# Patient Record
Sex: MALE | HISPANIC OR LATINO | Employment: FULL TIME | URBAN - METROPOLITAN AREA
[De-identification: names, ages, dates, MRNs, and addresses within clinical notes are randomized per-mention and may not be internally consistent; named-entity substitution may affect disease eponyms.]

---

## 2017-12-29 ENCOUNTER — OFFICE VISIT (OUTPATIENT)
Dept: MEDICAL GROUP | Facility: PHYSICIAN GROUP | Age: 32
End: 2017-12-29
Payer: COMMERCIAL

## 2017-12-29 VITALS
OXYGEN SATURATION: 96 % | HEART RATE: 117 BPM | WEIGHT: 188 LBS | SYSTOLIC BLOOD PRESSURE: 118 MMHG | DIASTOLIC BLOOD PRESSURE: 80 MMHG | HEIGHT: 72 IN | RESPIRATION RATE: 16 BRPM | TEMPERATURE: 100.1 F | BODY MASS INDEX: 25.47 KG/M2

## 2017-12-29 DIAGNOSIS — M79.10 MYALGIA: ICD-10-CM

## 2017-12-29 DIAGNOSIS — R52 BODY ACHES: ICD-10-CM

## 2017-12-29 DIAGNOSIS — H61.22 IMPACTED CERUMEN OF LEFT EAR: ICD-10-CM

## 2017-12-29 DIAGNOSIS — Z00.00 ROUTINE ADULT HEALTH MAINTENANCE: ICD-10-CM

## 2017-12-29 LAB
FLUAV+FLUBV AG SPEC QL IA: NEGATIVE
INT CON NEG: NEGATIVE
INT CON POS: POSITIVE

## 2017-12-29 PROCEDURE — 69210 REMOVE IMPACTED EAR WAX UNI: CPT | Performed by: INTERNAL MEDICINE

## 2017-12-29 PROCEDURE — 99203 OFFICE O/P NEW LOW 30 MIN: CPT | Mod: 25 | Performed by: INTERNAL MEDICINE

## 2017-12-29 PROCEDURE — 87804 INFLUENZA ASSAY W/OPTIC: CPT | Performed by: INTERNAL MEDICINE

## 2017-12-29 ASSESSMENT — PATIENT HEALTH QUESTIONNAIRE - PHQ9: CLINICAL INTERPRETATION OF PHQ2 SCORE: 0

## 2017-12-29 NOTE — PROGRESS NOTES
PRIMARY CARE CLINIC NEW PATIENT H&P  Chief Complaint   Patient presents with   • Sinus Problem   • Generalized Body Aches   • Otalgia     History of Present Illness     Myalgia  Symptoms started 3 days ago when he started to lose his voice. Reporting subjective fever and generalized body aches started yesterday. Has been taking Nyquil and Dayquil which helps. Also having bilateral ear pain when he sneezes and maxillary sinus tenderness. Denies nausea, vomiting.     Impacted cerumen of left ear  Has been having bilateral ear pain but has history of cerumen in left ear.     Current Outpatient Prescriptions   Medication Sig Dispense Refill   • lansoprazole (PREVACID) 30 MG CPDR Take 30 mg by mouth every day.     • ondansetron (ZOFRAN) 4 MG TABS Take 1 Tab by mouth every 8 hours as needed for Nausea/Vomiting. 5 Each 1     No current facility-administered medications for this visit.      History reviewed. No pertinent past medical history.  History reviewed. No pertinent surgical history.  Social History   Substance Use Topics   • Smoking status: Never Smoker   • Smokeless tobacco: Never Used   • Alcohol use Yes      Comment: socially every 3 weeks (5-6 beers or cocktails); used to drink 2-3 beers daily     Social History     Social History Narrative     for [employer]     Family History   Problem Relation Age of Onset   • Thyroid Mother    • Hyperlipidemia Father    • No Known Problems Sister    • No Known Problems Brother      Family Status   Relation Status   • Mother Alive   • Father Alive   • Sister Alive   • Brother Alive     Allergies: Patient has no known allergies.    ROS  Constitutional: positive for myalgias as per HPI  HEENT: Negative for  vision changes, positive for bilateral ear pain    Respiratory: Negative for shortness of breath  Cardiovascular: Negative for chest pain, palpitations  Gastrointestinal: Negative for blood in stool, constipation, diarrhea  Genitourinary: Negative for dysuria,  polyuria  Musculoskeletal: Negative for myalgias, back pain, and joint pain.   Skin: Negative for rash  Neurological: Negative for numbness, tingling  Psychiatric/Behavioral: Negative for depression, anxiety      Objective   Blood pressure 118/80, pulse (!) 117, temperature 37.8 °C (100.1 °F), resp. rate 16, height 1.829 m (6'), weight 85.3 kg (188 lb), SpO2 96 %. Body mass index is 25.5 kg/m².    General: Alert, oriented. In no acute distress   HEET: EOMI, PERRL, conjunctiva non-injected, sclera non-icteric.  Nares patent with no significant congestion or drainage.  Denise pinnae, external auditory canals, TM pearly gray with normal light reflex right. Cerumen impaction of left ear. Oropharyngeal injection   Neck: supple with no cervical, subclavicular lymphadenopathy, JVD, palpable thyroid nodules   Lungs: clear to auscultation bilaterally with good excursion.  CV: tachycardia, regular rhythm. Normal S1/S2  Abdomen soft, non-distended, non-tender with normal bowel sounds. No hepatosplenomegaly, no masses palpated  Skin: no lesions. Warm, dry   Psychiatric: appropriate mood and affect     Assessment and Plan   The following treatment plan was discussed     1. Myalgia  Negative for flu. Instructed to hydrate, rest, and try over the counter sinus rinses for sinus pressure. Was given a work note for tomorrow (was scheduled to work through the weekend).   - POCT Influenza A/B    2. Routine adult health maintenance  Will obtain fasting labs when feeling better.   - COMP METABOLIC PANEL; Future  - CBC WITH DIFFERENTIAL; Future  - LIPID PROFILE; Future  - VITAMIN D,25 HYDROXY; Future    3. Impacted cerumen of left ear  Procedure: Pt is prepped and lavaged by MA and residual wax curetted by MD with resolution of impaction.   After the procedure, TM pearly gray with normal light reflex bilaterally.    Return in about 1 year (around 12/29/2018).    Health Maintenance      Health Maintenance Due   Topic Date Due   • IMM  DTaP/Tdap/Td Vaccine (1 - Tdap) 11/07/2003   • IMM INFLUENZA (1) 09/01/2017     Wil Barboza MD  Internal Medicine  [facility]

## 2017-12-29 NOTE — ASSESSMENT & PLAN NOTE
Symptoms started 3 days ago when he started to lose his voice. Reporting subjective fever and generalized body aches started yesterday. Has been taking Nyquil and Dayquil which helps. Also having bilateral ear pain when he sneezes and maxillary sinus tenderness. Denies nausea, vomiting.

## 2017-12-29 NOTE — PATIENT INSTRUCTIONS
· Be sure to hydrate well and you may continue Nyquil/Dayquil  · You can use over the counter sinus rinses to help with the sinus pressure   · Please have your fasting blood work done when convenient

## 2017-12-29 NOTE — LETTER
December 29, 2017        Re: Maxime Fernandez    To Whom it May Concern,     Maxime was seen in my office today and is excused from work tomorrow Saturday December 30, 2017 for acute illness.           Please call with questions,         Wil Barboza MD

## 2019-10-03 ENCOUNTER — APPOINTMENT (OUTPATIENT)
Dept: RADIOLOGY | Facility: MEDICAL CENTER | Age: 34
DRG: 897 | End: 2019-10-03
Attending: HOSPITALIST

## 2019-10-03 ENCOUNTER — HOSPITAL ENCOUNTER (INPATIENT)
Facility: MEDICAL CENTER | Age: 34
LOS: 3 days | DRG: 897 | End: 2019-10-06
Attending: EMERGENCY MEDICINE | Admitting: HOSPITALIST

## 2019-10-03 DIAGNOSIS — F10.939 ALCOHOL WITHDRAWAL SYNDROME WITH COMPLICATION (HCC): ICD-10-CM

## 2019-10-03 DIAGNOSIS — R00.0 TACHYCARDIA: ICD-10-CM

## 2019-10-03 PROBLEM — E87.6 HYPOKALEMIA: Status: ACTIVE | Noted: 2019-10-03

## 2019-10-03 PROBLEM — F10.231 ALCOHOL DEPENDENCE WITH WITHDRAWAL DELIRIUM (HCC): Status: ACTIVE | Noted: 2019-10-03

## 2019-10-03 PROBLEM — E83.39 HYPOPHOSPHATEMIA: Status: ACTIVE | Noted: 2019-10-03

## 2019-10-03 PROBLEM — R11.2 NON-INTRACTABLE VOMITING WITH NAUSEA: Status: ACTIVE | Noted: 2019-10-03

## 2019-10-03 PROBLEM — E83.42 HYPOMAGNESEMIA: Status: ACTIVE | Noted: 2019-10-03

## 2019-10-03 LAB
ALBUMIN SERPL BCP-MCNC: 4.5 G/DL (ref 3.2–4.9)
ALBUMIN/GLOB SERPL: 1.7 G/DL
ALP SERPL-CCNC: 63 U/L (ref 30–99)
ALT SERPL-CCNC: 49 U/L (ref 2–50)
ANION GAP SERPL CALC-SCNC: 18 MMOL/L (ref 0–11.9)
AST SERPL-CCNC: 49 U/L (ref 12–45)
BASOPHILS # BLD AUTO: 0.2 % (ref 0–1.8)
BASOPHILS # BLD: 0.02 K/UL (ref 0–0.12)
BILIRUB SERPL-MCNC: 1.4 MG/DL (ref 0.1–1.5)
BUN SERPL-MCNC: 16 MG/DL (ref 8–22)
CALCIUM SERPL-MCNC: 9.5 MG/DL (ref 8.5–10.5)
CHLORIDE SERPL-SCNC: 93 MMOL/L (ref 96–112)
CO2 SERPL-SCNC: 23 MMOL/L (ref 20–33)
CREAT SERPL-MCNC: 0.9 MG/DL (ref 0.5–1.4)
EOSINOPHIL # BLD AUTO: 0 K/UL (ref 0–0.51)
EOSINOPHIL NFR BLD: 0 % (ref 0–6.9)
ERYTHROCYTE [DISTWIDTH] IN BLOOD BY AUTOMATED COUNT: 36.7 FL (ref 35.9–50)
ETHANOL BLD-MCNC: 0.15 G/DL
GLOBULIN SER CALC-MCNC: 2.7 G/DL (ref 1.9–3.5)
GLUCOSE SERPL-MCNC: 151 MG/DL (ref 65–99)
HCT VFR BLD AUTO: 43.3 % (ref 42–52)
HGB BLD-MCNC: 16.2 G/DL (ref 14–18)
IMM GRANULOCYTES # BLD AUTO: 0.04 K/UL (ref 0–0.11)
IMM GRANULOCYTES NFR BLD AUTO: 0.4 % (ref 0–0.9)
LIPASE SERPL-CCNC: 39 U/L (ref 11–82)
LYMPHOCYTES # BLD AUTO: 0.87 K/UL (ref 1–4.8)
LYMPHOCYTES NFR BLD: 8.8 % (ref 22–41)
MAGNESIUM SERPL-MCNC: 1.2 MG/DL (ref 1.5–2.5)
MCH RBC QN AUTO: 32.2 PG (ref 27–33)
MCHC RBC AUTO-ENTMCNC: 37.4 G/DL (ref 33.7–35.3)
MCV RBC AUTO: 86.1 FL (ref 81.4–97.8)
MONOCYTES # BLD AUTO: 0.52 K/UL (ref 0–0.85)
MONOCYTES NFR BLD AUTO: 5.3 % (ref 0–13.4)
NEUTROPHILS # BLD AUTO: 8.41 K/UL (ref 1.82–7.42)
NEUTROPHILS NFR BLD: 85.3 % (ref 44–72)
NRBC # BLD AUTO: 0 K/UL
NRBC BLD-RTO: 0 /100 WBC
PHOSPHATE SERPL-MCNC: 2.1 MG/DL (ref 2.5–4.5)
PLATELET # BLD AUTO: 162 K/UL (ref 164–446)
PMV BLD AUTO: 11.7 FL (ref 9–12.9)
POC BREATHALIZER: 0.14 PERCENT (ref 0–0.01)
POTASSIUM SERPL-SCNC: 2.8 MMOL/L (ref 3.6–5.5)
PROT SERPL-MCNC: 7.2 G/DL (ref 6–8.2)
RBC # BLD AUTO: 4.99 M/UL (ref 4.7–6.1)
SODIUM SERPL-SCNC: 134 MMOL/L (ref 135–145)
WBC # BLD AUTO: 9.9 K/UL (ref 4.8–10.8)

## 2019-10-03 PROCEDURE — 83735 ASSAY OF MAGNESIUM: CPT

## 2019-10-03 PROCEDURE — 96374 THER/PROPH/DIAG INJ IV PUSH: CPT

## 2019-10-03 PROCEDURE — 700102 HCHG RX REV CODE 250 W/ 637 OVERRIDE(OP): Performed by: HOSPITALIST

## 2019-10-03 PROCEDURE — 96365 THER/PROPH/DIAG IV INF INIT: CPT

## 2019-10-03 PROCEDURE — 700111 HCHG RX REV CODE 636 W/ 250 OVERRIDE (IP): Performed by: HOSPITALIST

## 2019-10-03 PROCEDURE — 96366 THER/PROPH/DIAG IV INF ADDON: CPT

## 2019-10-03 PROCEDURE — 302970 POC BREATHALIZER

## 2019-10-03 PROCEDURE — 71045 X-RAY EXAM CHEST 1 VIEW: CPT

## 2019-10-03 PROCEDURE — 700105 HCHG RX REV CODE 258: Performed by: HOSPITALIST

## 2019-10-03 PROCEDURE — 700111 HCHG RX REV CODE 636 W/ 250 OVERRIDE (IP): Performed by: EMERGENCY MEDICINE

## 2019-10-03 PROCEDURE — 700102 HCHG RX REV CODE 250 W/ 637 OVERRIDE(OP): Performed by: EMERGENCY MEDICINE

## 2019-10-03 PROCEDURE — 80307 DRUG TEST PRSMV CHEM ANLYZR: CPT

## 2019-10-03 PROCEDURE — 700101 HCHG RX REV CODE 250: Performed by: EMERGENCY MEDICINE

## 2019-10-03 PROCEDURE — 83690 ASSAY OF LIPASE: CPT

## 2019-10-03 PROCEDURE — 96376 TX/PRO/DX INJ SAME DRUG ADON: CPT

## 2019-10-03 PROCEDURE — 80053 COMPREHEN METABOLIC PANEL: CPT

## 2019-10-03 PROCEDURE — A9270 NON-COVERED ITEM OR SERVICE: HCPCS | Performed by: HOSPITALIST

## 2019-10-03 PROCEDURE — 96375 TX/PRO/DX INJ NEW DRUG ADDON: CPT

## 2019-10-03 PROCEDURE — 94760 N-INVAS EAR/PLS OXIMETRY 1: CPT

## 2019-10-03 PROCEDURE — A9270 NON-COVERED ITEM OR SERVICE: HCPCS | Performed by: EMERGENCY MEDICINE

## 2019-10-03 PROCEDURE — 770020 HCHG ROOM/CARE - TELE (206)

## 2019-10-03 PROCEDURE — HZ2ZZZZ DETOXIFICATION SERVICES FOR SUBSTANCE ABUSE TREATMENT: ICD-10-PCS | Performed by: HOSPITALIST

## 2019-10-03 PROCEDURE — 302970 POC BREATHALIZER: Performed by: EMERGENCY MEDICINE

## 2019-10-03 PROCEDURE — 700105 HCHG RX REV CODE 258: Performed by: EMERGENCY MEDICINE

## 2019-10-03 PROCEDURE — 99285 EMERGENCY DEPT VISIT HI MDM: CPT

## 2019-10-03 PROCEDURE — 85025 COMPLETE CBC W/AUTO DIFF WBC: CPT

## 2019-10-03 PROCEDURE — 700101 HCHG RX REV CODE 250: Performed by: HOSPITALIST

## 2019-10-03 PROCEDURE — 99223 1ST HOSP IP/OBS HIGH 75: CPT | Performed by: HOSPITALIST

## 2019-10-03 PROCEDURE — 84100 ASSAY OF PHOSPHORUS: CPT

## 2019-10-03 RX ORDER — ONDANSETRON 4 MG/1
4 TABLET, ORALLY DISINTEGRATING ORAL EVERY 4 HOURS PRN
Status: DISCONTINUED | OUTPATIENT
Start: 2019-10-03 | End: 2019-10-06 | Stop reason: HOSPADM

## 2019-10-03 RX ORDER — AMOXICILLIN 250 MG
2 CAPSULE ORAL 2 TIMES DAILY
Status: DISCONTINUED | OUTPATIENT
Start: 2019-10-03 | End: 2019-10-06 | Stop reason: HOSPADM

## 2019-10-03 RX ORDER — LORAZEPAM 2 MG/1
2 TABLET ORAL
Status: DISCONTINUED | OUTPATIENT
Start: 2019-10-03 | End: 2019-10-06 | Stop reason: HOSPADM

## 2019-10-03 RX ORDER — THIAMINE MONONITRATE (VIT B1) 100 MG
100 TABLET ORAL DAILY
Status: DISCONTINUED | OUTPATIENT
Start: 2019-10-04 | End: 2019-10-06 | Stop reason: HOSPADM

## 2019-10-03 RX ORDER — LORAZEPAM 2 MG/ML
2 INJECTION INTRAMUSCULAR
Status: DISCONTINUED | OUTPATIENT
Start: 2019-10-03 | End: 2019-10-06 | Stop reason: HOSPADM

## 2019-10-03 RX ORDER — FOLIC ACID 1 MG/1
1 TABLET ORAL DAILY
Status: DISCONTINUED | OUTPATIENT
Start: 2019-10-04 | End: 2019-10-06 | Stop reason: HOSPADM

## 2019-10-03 RX ORDER — SODIUM CHLORIDE, SODIUM LACTATE, POTASSIUM CHLORIDE, CALCIUM CHLORIDE 600; 310; 30; 20 MG/100ML; MG/100ML; MG/100ML; MG/100ML
1000 INJECTION, SOLUTION INTRAVENOUS ONCE
Status: COMPLETED | OUTPATIENT
Start: 2019-10-03 | End: 2019-10-03

## 2019-10-03 RX ORDER — CLONIDINE HYDROCHLORIDE 0.1 MG/1
0.1 TABLET ORAL
Status: DISCONTINUED | OUTPATIENT
Start: 2019-10-03 | End: 2019-10-06 | Stop reason: HOSPADM

## 2019-10-03 RX ORDER — LORAZEPAM 1 MG/1
0.5 TABLET ORAL EVERY 4 HOURS PRN
Status: DISCONTINUED | OUTPATIENT
Start: 2019-10-03 | End: 2019-10-06 | Stop reason: HOSPADM

## 2019-10-03 RX ORDER — LORAZEPAM 2 MG/ML
1.5 INJECTION INTRAMUSCULAR
Status: DISCONTINUED | OUTPATIENT
Start: 2019-10-03 | End: 2019-10-06 | Stop reason: HOSPADM

## 2019-10-03 RX ORDER — POTASSIUM CHLORIDE 20 MEQ/1
40 TABLET, EXTENDED RELEASE ORAL ONCE
Status: COMPLETED | OUTPATIENT
Start: 2019-10-03 | End: 2019-10-03

## 2019-10-03 RX ORDER — LORAZEPAM 2 MG/ML
1 INJECTION INTRAMUSCULAR
Status: DISCONTINUED | OUTPATIENT
Start: 2019-10-03 | End: 2019-10-06 | Stop reason: HOSPADM

## 2019-10-03 RX ORDER — LORAZEPAM 2 MG/ML
0.5 INJECTION INTRAMUSCULAR EVERY 4 HOURS PRN
Status: DISCONTINUED | OUTPATIENT
Start: 2019-10-03 | End: 2019-10-06 | Stop reason: HOSPADM

## 2019-10-03 RX ORDER — PROMETHAZINE HYDROCHLORIDE 25 MG/1
12.5-25 TABLET ORAL EVERY 4 HOURS PRN
Status: DISCONTINUED | OUTPATIENT
Start: 2019-10-03 | End: 2019-10-06 | Stop reason: HOSPADM

## 2019-10-03 RX ORDER — LORAZEPAM 2 MG/1
4 TABLET ORAL
Status: DISCONTINUED | OUTPATIENT
Start: 2019-10-03 | End: 2019-10-06 | Stop reason: HOSPADM

## 2019-10-03 RX ORDER — ONDANSETRON 2 MG/ML
4 INJECTION INTRAMUSCULAR; INTRAVENOUS EVERY 4 HOURS PRN
Status: DISCONTINUED | OUTPATIENT
Start: 2019-10-03 | End: 2019-10-06 | Stop reason: HOSPADM

## 2019-10-03 RX ORDER — LORAZEPAM 1 MG/1
1 TABLET ORAL EVERY 4 HOURS PRN
Status: DISCONTINUED | OUTPATIENT
Start: 2019-10-03 | End: 2019-10-06 | Stop reason: HOSPADM

## 2019-10-03 RX ORDER — LORAZEPAM 2 MG/ML
2 INJECTION INTRAMUSCULAR ONCE
Status: COMPLETED | OUTPATIENT
Start: 2019-10-03 | End: 2019-10-03

## 2019-10-03 RX ORDER — MAGNESIUM SULFATE HEPTAHYDRATE 40 MG/ML
2 INJECTION, SOLUTION INTRAVENOUS ONCE
Status: COMPLETED | OUTPATIENT
Start: 2019-10-03 | End: 2019-10-03

## 2019-10-03 RX ORDER — PROMETHAZINE HYDROCHLORIDE 25 MG/1
12.5-25 SUPPOSITORY RECTAL EVERY 4 HOURS PRN
Status: DISCONTINUED | OUTPATIENT
Start: 2019-10-03 | End: 2019-10-06 | Stop reason: HOSPADM

## 2019-10-03 RX ORDER — POLYETHYLENE GLYCOL 3350 17 G/17G
1 POWDER, FOR SOLUTION ORAL
Status: DISCONTINUED | OUTPATIENT
Start: 2019-10-03 | End: 2019-10-06 | Stop reason: HOSPADM

## 2019-10-03 RX ORDER — LABETALOL HYDROCHLORIDE 5 MG/ML
10 INJECTION, SOLUTION INTRAVENOUS EVERY 4 HOURS PRN
Status: DISCONTINUED | OUTPATIENT
Start: 2019-10-03 | End: 2019-10-06 | Stop reason: HOSPADM

## 2019-10-03 RX ORDER — LORAZEPAM 2 MG/ML
1 INJECTION INTRAMUSCULAR ONCE
Status: COMPLETED | OUTPATIENT
Start: 2019-10-03 | End: 2019-10-03

## 2019-10-03 RX ORDER — BISACODYL 10 MG
10 SUPPOSITORY, RECTAL RECTAL
Status: DISCONTINUED | OUTPATIENT
Start: 2019-10-03 | End: 2019-10-06 | Stop reason: HOSPADM

## 2019-10-03 RX ORDER — PROCHLORPERAZINE EDISYLATE 5 MG/ML
5-10 INJECTION INTRAMUSCULAR; INTRAVENOUS EVERY 4 HOURS PRN
Status: DISCONTINUED | OUTPATIENT
Start: 2019-10-03 | End: 2019-10-06 | Stop reason: HOSPADM

## 2019-10-03 RX ADMIN — POTASSIUM CHLORIDE: 149 INJECTION, SOLUTION, CONCENTRATE INTRAVENOUS at 23:59

## 2019-10-03 RX ADMIN — MAGNESIUM SULFATE IN WATER 2 G: 40 INJECTION, SOLUTION INTRAVENOUS at 20:22

## 2019-10-03 RX ADMIN — POTASSIUM CHLORIDE 40 MEQ: 1500 TABLET, EXTENDED RELEASE ORAL at 16:38

## 2019-10-03 RX ADMIN — SODIUM CHLORIDE, POTASSIUM CHLORIDE, SODIUM LACTATE AND CALCIUM CHLORIDE 1000 ML: 600; 310; 30; 20 INJECTION, SOLUTION INTRAVENOUS at 18:16

## 2019-10-03 RX ADMIN — THIAMINE HYDROCHLORIDE: 100 INJECTION, SOLUTION INTRAMUSCULAR; INTRAVENOUS at 20:22

## 2019-10-03 RX ADMIN — LORAZEPAM 2 MG: 2 INJECTION INTRAMUSCULAR; INTRAVENOUS at 18:16

## 2019-10-03 RX ADMIN — LABETALOL HYDROCHLORIDE 10 MG: 5 INJECTION INTRAVENOUS at 22:49

## 2019-10-03 RX ADMIN — LORAZEPAM 1 MG: 1 TABLET ORAL at 20:47

## 2019-10-03 RX ADMIN — LORAZEPAM 1 MG: 2 INJECTION INTRAMUSCULAR; INTRAVENOUS at 15:18

## 2019-10-03 RX ADMIN — THIAMINE HYDROCHLORIDE: 100 INJECTION, SOLUTION INTRAMUSCULAR; INTRAVENOUS at 15:28

## 2019-10-03 ASSESSMENT — LIFESTYLE VARIABLES
DOES PATIENT WANT TO STOP DRINKING: YES
TOTAL SCORE: 9
ORIENTATION AND CLOUDING OF SENSORIUM: ORIENTED AND CAN DO SERIAL ADDITIONS
TOTAL SCORE: 4
TOTAL SCORE: MODERATE ITCHING, PINS AND NEEDLES SENSATION, BURNING OR NUMBNESS
ANXIETY: *
EVER HAD A DRINK FIRST THING IN THE MORNING TO STEADY YOUR NERVES TO GET RID OF A HANGOVER: YES
HAVE YOU EVER FELT YOU SHOULD CUT DOWN ON YOUR DRINKING: YES
HAVE PEOPLE ANNOYED YOU BY CRITICIZING YOUR DRINKING: YES
VISUAL DISTURBANCES: VERY MILD SENSITIVITY
EVER_SMOKED: YES
TOTAL SCORE: 4
TREMOR: NO TREMOR
PAROXYSMAL SWEATS: BARELY PERCEPTIBLE SWEATING. PALMS MOIST
NAUSEA AND VOMITING: MILD NAUSEA WITH NO VOMITING
AUDITORY DISTURBANCES: NOT PRESENT
ON A TYPICAL DAY WHEN YOU DRINK ALCOHOL HOW MANY DRINKS DO YOU HAVE: 6
EVER FELT BAD OR GUILTY ABOUT YOUR DRINKING: YES
SUBSTANCE_ABUSE: 0
AVERAGE NUMBER OF DAYS PER WEEK YOU HAVE A DRINK CONTAINING ALCOHOL: 7
TOTAL SCORE: 4
HOW MANY TIMES IN THE PAST YEAR HAVE YOU HAD 5 OR MORE DRINKS IN A DAY: 5
HEADACHE, FULLNESS IN HEAD: VERY MILD
DO YOU DRINK ALCOHOL: YES
AGITATION: NORMAL ACTIVITY
CONSUMPTION TOTAL: POSITIVE

## 2019-10-03 ASSESSMENT — ENCOUNTER SYMPTOMS
NECK PAIN: 0
FALLS: 0
CLAUDICATION: 0
DOUBLE VISION: 0
DIZZINESS: 0
BACK PAIN: 0
HEMOPTYSIS: 0
PND: 0
SORE THROAT: 0
DIAPHORESIS: 1
SHORTNESS OF BREATH: 0
FLANK PAIN: 0
STRIDOR: 0
ABDOMINAL PAIN: 0
TREMORS: 0
PALPITATIONS: 0
PHOTOPHOBIA: 0
HALLUCINATIONS: 0
FEVER: 0
DIARRHEA: 0
MYALGIAS: 0
NAUSEA: 1
EYE PAIN: 0
CONSTIPATION: 0
BRUISES/BLEEDS EASILY: 0
POLYDIPSIA: 0
WHEEZING: 0
ORTHOPNEA: 0
BLURRED VISION: 0
CHILLS: 1
DEPRESSION: 0
COUGH: 0
SINUS PAIN: 0
BLOOD IN STOOL: 0
SPUTUM PRODUCTION: 0
HEARTBURN: 1
TINGLING: 0
VOMITING: 1
HEADACHES: 0
WEAKNESS: 0

## 2019-10-03 ASSESSMENT — COPD QUESTIONNAIRES
COPD SCREENING SCORE: 2
HAVE YOU SMOKED AT LEAST 100 CIGARETTES IN YOUR ENTIRE LIFE: YES
DURING THE PAST 4 WEEKS HOW MUCH DID YOU FEEL SHORT OF BREATH: NONE/LITTLE OF THE TIME
DO YOU EVER COUGH UP ANY MUCUS OR PHLEGM?: NO/ONLY WITH OCCASIONAL COLDS OR INFECTIONS

## 2019-10-03 NOTE — ED NOTES
RN informed by Psychiatry that when patient is medically cleared he will discharge to self and has been given all the necessary resources

## 2019-10-03 NOTE — ED TRIAGE NOTES
Amb to triage w/ a request for medical detox.  Pt states he has been drinking heavily, 7-10 shots of Joycelyn a day x 15 days.  Last drink was 1130.  Pt reports recent thoughts of suicide. Pt denies having a plan and states no intention of acting on these thoughts.  Pt reports recent life stressors which pt states was a trigger for his drinking as well as his suicidal thoughts.  States his sig other recently ended their relationship and they just had a baby. Pt calm & cooperative at triage .

## 2019-10-03 NOTE — ED NOTES
During RN's conversation with patient, patient never admits to Suicidal Ideation, he says that he thinks he is more so depressed. He expresses concerns about his cyclical ETOH abuse and stress at home over breaking up with his girlfriend. He denies ever feeling SI or ever attempting to take his life

## 2019-10-03 NOTE — ED NOTES
Pt ambulated back to room, changed into gown and clothing, keys and phone placed in bag and removed. Friend who he called at bedside. Pt states last drink was this morning around 11 AM, has not detoxed from alcohol ever. Denies thoughts of SI now.   Breathalyzer performed at 0.142.   Chart up for ERP.

## 2019-10-03 NOTE — ED NOTES
This RN received hand off report on patient from Judy AN   This RN's primary care of patient began at this time

## 2019-10-03 NOTE — ED NOTES
Blood drawn from initial IV start and labeled after verification of patient name and date of birth. Sent to Lab

## 2019-10-03 NOTE — DISCHARGE PLANNING
ALERT team BH note:  Per [facility] ERP Dr. Ferreira's request, writer RN reviewed community CD resources with pt including [facility], [facility]; written information given; pt verbalized understanding; [employer] insurance plan; pt called [facility], male bed is available, and pt can transport there for intake evaluation by his brother; writer RN faxed over pt referral to fax # [phone]; pt denies SI, HI, or self-harm ideation; writer RN updated Dr. Ferreira

## 2019-10-03 NOTE — ED PROVIDER NOTES
ED Provider Note    CHIEF COMPLAINT  Chief Complaint   Patient presents with   • Detox   • Suicidal Ideation       HPI  Maxime Fernandez is a 33 y.o. male who presents to the emergency department for evaluation of suicidal ideation and alcohol detox.  Patient has been drinking heavily for the last 2 weeks or so.  Mostly whiskey.  Last drink was 11:30 AM.  He states he has had thoughts of suicide in the past but not today.  Denies any specific plan denies any suicide attempt.  He denies any coingestions other than alcohol.  Comes in today because he wants help with detox.  He has no medical problems today other than feeling like he is in withdrawal.  Denies any nausea or vomiting.  No hematemesis no hematochezia.  No chest pain cough shortness of breath fevers or chills.  Denies any other aggravating leaving factors or associated complaints.     REVIEW OF SYSTEMS  See HPI for further details. All other systems are negative.    PAST MEDICAL HISTORY  History reviewed. No pertinent past medical history.    FAMILY HISTORY  Family History   Problem Relation Age of Onset   • Thyroid Mother    • Hyperlipidemia Father    • No Known Problems Sister    • No Known Problems Brother        SOCIAL HISTORY  Social History     Socioeconomic History   • Marital status: Single     Spouse name: Not on file   • Number of children: Not on file   • Years of education: Not on file   • Highest education level: Not on file   Occupational History   • Not on file   Social Needs   • Financial resource strain: Not on file   • Food insecurity:     Worry: Not on file     Inability: Not on file   • Transportation needs:     Medical: Not on file     Non-medical: Not on file   Tobacco Use   • Smoking status: Never Smoker   • Smokeless tobacco: Never Used   Substance and Sexual Activity   • Alcohol use: Yes     Comment: socially every 3 weeks (5-6 beers or cocktails); used to drink 2-3 beers daily   • Drug use: No   • Sexual activity: Yes      "Partners: Female   Lifestyle   • Physical activity:     Days per week: Not on file     Minutes per session: Not on file   • Stress: Not on file   Relationships   • Social connections:     Talks on phone: Not on file     Gets together: Not on file     Attends [employer] service: Not on file     Active member of club or organization: Not on file     Attends meetings of clubs or organizations: Not on file     Relationship status: Not on file   • Intimate partner violence:     Fear of current or ex partner: Not on file     Emotionally abused: Not on file     Physically abused: Not on file     Forced sexual activity: Not on file   Other Topics Concern   • Not on file   Social History Narrative     for [employer]       SURGICAL HISTORY  History reviewed. No pertinent surgical history.    CURRENT MEDICATIONS  Home Medications     Reviewed by Dian Walter R.N. (Registered Nurse) on 10/03/19 at 1515  Med List Status: Not Addressed   Medication Last Dose Status        Patient Panda Taking any Medications                       ALLERGIES  No Known Allergies    PHYSICAL EXAM  VITAL SIGNS: /82   Pulse (!) 136   Temp 36.6 °C (97.9 °F) (Temporal)   Resp 14   Ht 1.803 m (5' 11\")   Wt 85.4 kg (188 lb 4.4 oz)   SpO2 95%   BMI 26.26 kg/m²    Constitutional: Awake ill-appearing, no acute distress per  HENT: Normocephalic, Atraumatic, Bilateral external ears normal, Oropharynx dry, No oral exudates, Nose normal.   Eyes: PERRL, EOMI, Conjunctiva normal, No discharge.   Neck: Normal range of motion, No tenderness, Supple, No stridor.   Cardiovascular: Tachycardic no murmurs rubs or gallops  Thorax & Lungs: Clear to auscultation bilaterally  Abdomen: Bowel sounds normal, Soft, No tenderness  Rectal: Brown guaiac negative stool.  Skin: Warm, Dry, No erythema, No rash.    Musculoskeletal: Good range of motion in all major joints.   Neurologic: Alert & oriented x 3, No focal deficits noted.   Psychiatric: Affect " normal      Results for orders placed or performed during the hospital encounter of 10/03/19   CBC WITH DIFFERENTIAL   Result Value Ref Range    WBC 9.9 4.8 - 10.8 K/uL    RBC 4.99 4.70 - 6.10 M/uL    Hemoglobin 16.2 14.0 - 18.0 g/dL    Hematocrit 43.3 42.0 - 52.0 %    MCV 86.1 81.4 - 97.8 fL    MCH 32.2 27.0 - 33.0 pg    MCHC 37.4 (H) 33.7 - 35.3 g/dL    RDW 36.7 35.9 - 50.0 fL    Platelet Count 162 (L) 164 - 446 K/uL    MPV 11.7 9.0 - 12.9 fL    Neutrophils-Polys 85.30 (H) 44.00 - 72.00 %    Lymphocytes 8.80 (L) 22.00 - 41.00 %    Monocytes 5.30 0.00 - 13.40 %    Eosinophils 0.00 0.00 - 6.90 %    Basophils 0.20 0.00 - 1.80 %    Immature Granulocytes 0.40 0.00 - 0.90 %    Nucleated RBC 0.00 /100 WBC    Neutrophils (Absolute) 8.41 (H) 1.82 - 7.42 K/uL    Lymphs (Absolute) 0.87 (L) 1.00 - 4.80 K/uL    Monos (Absolute) 0.52 0.00 - 0.85 K/uL    Eos (Absolute) 0.00 0.00 - 0.51 K/uL    Baso (Absolute) 0.02 0.00 - 0.12 K/uL    Immature Granulocytes (abs) 0.04 0.00 - 0.11 K/uL    NRBC (Absolute) 0.00 K/uL   COMP METABOLIC PANEL   Result Value Ref Range    Sodium 134 (L) 135 - 145 mmol/L    Potassium 2.8 (L) 3.6 - 5.5 mmol/L    Chloride 93 (L) 96 - 112 mmol/L    Co2 23 20 - 33 mmol/L    Anion Gap 18.0 (H) 0.0 - 11.9    Glucose 151 (H) 65 - 99 mg/dL    Bun 16 8 - 22 mg/dL    Creatinine 0.90 0.50 - 1.40 mg/dL    Calcium 9.5 8.5 - 10.5 mg/dL    AST(SGOT) 49 (H) 12 - 45 U/L    ALT(SGPT) 49 2 - 50 U/L    Alkaline Phosphatase 63 30 - 99 U/L    Total Bilirubin 1.4 0.1 - 1.5 mg/dL    Albumin 4.5 3.2 - 4.9 g/dL    Total Protein 7.2 6.0 - 8.2 g/dL    Globulin 2.7 1.9 - 3.5 g/dL    A-G Ratio 1.7 g/dL   LIPASE   Result Value Ref Range    Lipase 39 11 - 82 U/L   DIAGNOSTIC ALCOHOL   Result Value Ref Range    Diagnostic Alcohol 0.15 (H) 0.00 g/dL   ESTIMATED GFR   Result Value Ref Range    GFR If African American >60 >60 mL/min/1.73 m 2    GFR If Non African American >60 >60 mL/min/1.73 m 2   MAGNESIUM   Result Value Ref Range     Magnesium 1.2 (L) 1.5 - 2.5 mg/dL   POC BREATHALIZER   Result Value Ref Range    POC Breathalizer 0.142 (A) 0.00 - 0.01 Percent        RADIOLOGY/PROCEDURES  No orders to display         COURSE & MEDICAL DECISION MAKING  Pertinent Labs & Imaging studies reviewed. (See chart for details)    The patient presents the emergency department tremulous, feeling ill and symptoms of alcohol withdrawal.  He is been drinking heavily for several days and now he has worsening symptoms when he is trying to decrease his alcohol consumption.  He is tachycardic and has dry mucous membranes and he is ill-appearing.    The patient is worked up with an IV, and labs for difficult diagnosis was dehydration, alcohol withdrawal, substance abuse.  Less likely includes renal failure, or GI bleed    An IV is established is given a test bag and a milligram of Ativan.  He is reassessed after that and is worsening tachycardia and worsening symptoms.    I ordered a second liter of fluid and additional Ativan.  Patient will be admitted to the hospital for continued work-up and treatment.      Indication for IV fluids his tachycardia dry mucous membranes and ability to tolerate a fluid resuscitation orally.  He is reassessed after that and is improved    FINAL IMPRESSION  1. Alcohol withdrawal syndrome with complication (HCC)     2. Tachycardia         2.   3.         Electronically signed by: Zeke Ferreira, 10/3/2019 3:22 PM

## 2019-10-03 NOTE — ED NOTES
Hourly rounding completed  Patient is up to date on current plan of care  Patient is resting comfortably in bed   Patient denies any need for use of the restroom, denies need for repositioning, denies need for any comfort care at this time     Family at bedside

## 2019-10-03 NOTE — ED NOTES
Hourly rounding completed  Patient is up to date on current plan of care  Patient is resting comfortably in bed   Patient denies any need for use of the restroom, denies need for repositioning, denies need for any comfort care at this time

## 2019-10-04 ENCOUNTER — APPOINTMENT (OUTPATIENT)
Dept: RADIOLOGY | Facility: MEDICAL CENTER | Age: 34
DRG: 897 | End: 2019-10-04
Attending: HOSPITALIST

## 2019-10-04 PROBLEM — D69.6 THROMBOCYTOPENIA (HCC): Status: ACTIVE | Noted: 2019-10-04

## 2019-10-04 PROBLEM — R79.89 ELEVATED LFTS: Status: ACTIVE | Noted: 2019-10-04

## 2019-10-04 LAB
ALBUMIN SERPL BCP-MCNC: 4.2 G/DL (ref 3.2–4.9)
ALBUMIN/GLOB SERPL: 2 G/DL
ALP SERPL-CCNC: 57 U/L (ref 30–99)
ALT SERPL-CCNC: 47 U/L (ref 2–50)
ANION GAP SERPL CALC-SCNC: 13 MMOL/L (ref 0–11.9)
AST SERPL-CCNC: 67 U/L (ref 12–45)
BASOPHILS # BLD AUTO: 0.2 % (ref 0–1.8)
BASOPHILS # BLD: 0.02 K/UL (ref 0–0.12)
BILIRUB CONJ SERPL-MCNC: 0.5 MG/DL (ref 0.1–0.5)
BILIRUB INDIRECT SERPL-MCNC: 1.4 MG/DL (ref 0–1)
BILIRUB SERPL-MCNC: 1.9 MG/DL (ref 0.1–1.5)
BUN SERPL-MCNC: 10 MG/DL (ref 8–22)
CALCIUM SERPL-MCNC: 7.6 MG/DL (ref 8.5–10.5)
CHLORIDE SERPL-SCNC: 98 MMOL/L (ref 96–112)
CO2 SERPL-SCNC: 25 MMOL/L (ref 20–33)
CREAT SERPL-MCNC: 0.76 MG/DL (ref 0.5–1.4)
EKG IMPRESSION: NORMAL
EOSINOPHIL # BLD AUTO: 0 K/UL (ref 0–0.51)
EOSINOPHIL NFR BLD: 0 % (ref 0–6.9)
ERYTHROCYTE [DISTWIDTH] IN BLOOD BY AUTOMATED COUNT: 36.2 FL (ref 35.9–50)
GLOBULIN SER CALC-MCNC: 2.1 G/DL (ref 1.9–3.5)
GLUCOSE SERPL-MCNC: 122 MG/DL (ref 65–99)
HCT VFR BLD AUTO: 39.4 % (ref 42–52)
HGB BLD-MCNC: 14.4 G/DL (ref 14–18)
IMM GRANULOCYTES # BLD AUTO: 0.02 K/UL (ref 0–0.11)
IMM GRANULOCYTES NFR BLD AUTO: 0.2 % (ref 0–0.9)
LYMPHOCYTES # BLD AUTO: 0.96 K/UL (ref 1–4.8)
LYMPHOCYTES NFR BLD: 10.2 % (ref 22–41)
MAGNESIUM SERPL-MCNC: 1.9 MG/DL (ref 1.5–2.5)
MCH RBC QN AUTO: 31.2 PG (ref 27–33)
MCHC RBC AUTO-ENTMCNC: 36.5 G/DL (ref 33.7–35.3)
MCV RBC AUTO: 85.3 FL (ref 81.4–97.8)
MONOCYTES # BLD AUTO: 0.57 K/UL (ref 0–0.85)
MONOCYTES NFR BLD AUTO: 6 % (ref 0–13.4)
NEUTROPHILS # BLD AUTO: 7.88 K/UL (ref 1.82–7.42)
NEUTROPHILS NFR BLD: 83.4 % (ref 44–72)
NRBC # BLD AUTO: 0 K/UL
NRBC BLD-RTO: 0 /100 WBC
PHOSPHATE SERPL-MCNC: 3.1 MG/DL (ref 2.5–4.5)
PLATELET # BLD AUTO: 134 K/UL (ref 164–446)
PMV BLD AUTO: 11.9 FL (ref 9–12.9)
POTASSIUM SERPL-SCNC: 2.8 MMOL/L (ref 3.6–5.5)
POTASSIUM SERPL-SCNC: 2.9 MMOL/L (ref 3.6–5.5)
PROT SERPL-MCNC: 6.3 G/DL (ref 6–8.2)
RBC # BLD AUTO: 4.62 M/UL (ref 4.7–6.1)
SODIUM SERPL-SCNC: 136 MMOL/L (ref 135–145)
WBC # BLD AUTO: 9.5 K/UL (ref 4.8–10.8)

## 2019-10-04 PROCEDURE — 770020 HCHG ROOM/CARE - TELE (206)

## 2019-10-04 PROCEDURE — 83735 ASSAY OF MAGNESIUM: CPT

## 2019-10-04 PROCEDURE — 84100 ASSAY OF PHOSPHORUS: CPT

## 2019-10-04 PROCEDURE — 700111 HCHG RX REV CODE 636 W/ 250 OVERRIDE (IP): Performed by: HOSPITALIST

## 2019-10-04 PROCEDURE — 74181 MRI ABDOMEN W/O CONTRAST: CPT

## 2019-10-04 PROCEDURE — 80053 COMPREHEN METABOLIC PANEL: CPT

## 2019-10-04 PROCEDURE — 93005 ELECTROCARDIOGRAM TRACING: CPT | Performed by: HOSPITALIST

## 2019-10-04 PROCEDURE — A9270 NON-COVERED ITEM OR SERVICE: HCPCS | Performed by: HOSPITALIST

## 2019-10-04 PROCEDURE — 700102 HCHG RX REV CODE 250 W/ 637 OVERRIDE(OP): Performed by: HOSPITALIST

## 2019-10-04 PROCEDURE — 700105 HCHG RX REV CODE 258: Performed by: HOSPITALIST

## 2019-10-04 PROCEDURE — 99233 SBSQ HOSP IP/OBS HIGH 50: CPT | Performed by: HOSPITALIST

## 2019-10-04 PROCEDURE — 85025 COMPLETE CBC W/AUTO DIFF WBC: CPT

## 2019-10-04 PROCEDURE — 93010 ELECTROCARDIOGRAM REPORT: CPT | Performed by: INTERNAL MEDICINE

## 2019-10-04 PROCEDURE — 84132 ASSAY OF SERUM POTASSIUM: CPT

## 2019-10-04 PROCEDURE — 36415 COLL VENOUS BLD VENIPUNCTURE: CPT

## 2019-10-04 PROCEDURE — 76700 US EXAM ABDOM COMPLETE: CPT

## 2019-10-04 PROCEDURE — 700101 HCHG RX REV CODE 250: Performed by: HOSPITALIST

## 2019-10-04 PROCEDURE — 82248 BILIRUBIN DIRECT: CPT

## 2019-10-04 RX ORDER — SODIUM CHLORIDE 9 MG/ML
INJECTION, SOLUTION INTRAVENOUS CONTINUOUS
Status: DISCONTINUED | OUTPATIENT
Start: 2019-10-04 | End: 2019-10-06

## 2019-10-04 RX ORDER — POTASSIUM CHLORIDE 20 MEQ/1
20 TABLET, EXTENDED RELEASE ORAL ONCE
Status: COMPLETED | OUTPATIENT
Start: 2019-10-04 | End: 2019-10-04

## 2019-10-04 RX ADMIN — SODIUM CHLORIDE: 9 INJECTION, SOLUTION INTRAVENOUS at 16:48

## 2019-10-04 RX ADMIN — THERA TABS 1 TABLET: TAB at 05:16

## 2019-10-04 RX ADMIN — FAMOTIDINE 20 MG: 10 INJECTION INTRAVENOUS at 16:48

## 2019-10-04 RX ADMIN — POTASSIUM PHOSPHATE, MONOBASIC AND POTASSIUM PHOSPHATE, DIBASIC 30 MMOL: 224; 236 INJECTION, SOLUTION, CONCENTRATE INTRAVENOUS at 00:32

## 2019-10-04 RX ADMIN — LORAZEPAM 1 MG: 1 TABLET ORAL at 20:47

## 2019-10-04 RX ADMIN — ENOXAPARIN SODIUM 40 MG: 100 INJECTION SUBCUTANEOUS at 05:16

## 2019-10-04 RX ADMIN — FOLIC ACID 1 MG: 1 TABLET ORAL at 05:16

## 2019-10-04 RX ADMIN — Medication 100 MG: at 05:16

## 2019-10-04 RX ADMIN — LORAZEPAM 1 MG: 1 TABLET ORAL at 08:27

## 2019-10-04 RX ADMIN — POTASSIUM CHLORIDE 20 MEQ: 1500 TABLET, EXTENDED RELEASE ORAL at 08:27

## 2019-10-04 ASSESSMENT — LIFESTYLE VARIABLES
TOTAL SCORE: VERY MILD ITCHING, PINS AND NEEDLES SENSATION, BURNING OR NUMBNESS
AUDITORY DISTURBANCES: NOT PRESENT
PAROXYSMAL SWEATS: BARELY PERCEPTIBLE SWEATING. PALMS MOIST
HEADACHE, FULLNESS IN HEAD: VERY MILD
AGITATION: NORMAL ACTIVITY
NAUSEA AND VOMITING: NO NAUSEA AND NO VOMITING
TOTAL SCORE: 1
AGITATION: NORMAL ACTIVITY
NAUSEA AND VOMITING: NO NAUSEA AND NO VOMITING
VISUAL DISTURBANCES: NOT PRESENT
ANXIETY: *
NAUSEA AND VOMITING: NO NAUSEA AND NO VOMITING
AGITATION: SOMEWHAT MORE THAN NORMAL ACTIVITY
TOTAL SCORE: 5
VISUAL DISTURBANCES: NOT PRESENT
HEADACHE, FULLNESS IN HEAD: NOT PRESENT
TREMOR: NO TREMOR
SUBSTANCE_ABUSE: 0
TOTAL SCORE: 8
VISUAL DISTURBANCES: NOT PRESENT
VISUAL DISTURBANCES: NOT PRESENT
TREMOR: NO TREMOR
TREMOR: NO TREMOR
PAROXYSMAL SWEATS: BARELY PERCEPTIBLE SWEATING. PALMS MOIST
TOTAL SCORE: MILD ITCHING, PINS AND NEEDLES SENSATION, BURNING OR NUMBNESS
HEADACHE, FULLNESS IN HEAD: VERY MILD
HEADACHE, FULLNESS IN HEAD: NOT PRESENT
ANXIETY: *
ORIENTATION AND CLOUDING OF SENSORIUM: ORIENTED AND CAN DO SERIAL ADDITIONS
ORIENTATION AND CLOUDING OF SENSORIUM: ORIENTED AND CAN DO SERIAL ADDITIONS
TOTAL SCORE: 8
TOTAL SCORE: 5
PAROXYSMAL SWEATS: BARELY PERCEPTIBLE SWEATING. PALMS MOIST
ORIENTATION AND CLOUDING OF SENSORIUM: ORIENTED AND CAN DO SERIAL ADDITIONS
TREMOR: NO TREMOR
PAROXYSMAL SWEATS: *
NAUSEA AND VOMITING: MILD NAUSEA WITH NO VOMITING
AGITATION: NORMAL ACTIVITY
ANXIETY: *
ANXIETY: *
TOTAL SCORE: 1
PAROXYSMAL SWEATS: BARELY PERCEPTIBLE SWEATING. PALMS MOIST
AUDITORY DISTURBANCES: NOT PRESENT
ORIENTATION AND CLOUDING OF SENSORIUM: ORIENTED AND CAN DO SERIAL ADDITIONS
VISUAL DISTURBANCES: NOT PRESENT
AUDITORY DISTURBANCES: NOT PRESENT
ANXIETY: NO ANXIETY (AT EASE)
AGITATION: SOMEWHAT MORE THAN NORMAL ACTIVITY
PAROXYSMAL SWEATS: BARELY PERCEPTIBLE SWEATING. PALMS MOIST
ORIENTATION AND CLOUDING OF SENSORIUM: ORIENTED AND CAN DO SERIAL ADDITIONS
AUDITORY DISTURBANCES: NOT PRESENT
TREMOR: NO TREMOR
NAUSEA AND VOMITING: NO NAUSEA AND NO VOMITING
TREMOR: NO TREMOR
HEADACHE, FULLNESS IN HEAD: NOT PRESENT
ORIENTATION AND CLOUDING OF SENSORIUM: ORIENTED AND CAN DO SERIAL ADDITIONS
NAUSEA AND VOMITING: NO NAUSEA AND NO VOMITING
VISUAL DISTURBANCES: NOT PRESENT
AUDITORY DISTURBANCES: NOT PRESENT
AGITATION: SOMEWHAT MORE THAN NORMAL ACTIVITY
HEADACHE, FULLNESS IN HEAD: MILD
ANXIETY: NO ANXIETY (AT EASE)
AUDITORY DISTURBANCES: NOT PRESENT

## 2019-10-04 ASSESSMENT — ENCOUNTER SYMPTOMS
HEARTBURN: 0
PALPITATIONS: 0
HEADACHES: 0
VOMITING: 0
COUGH: 0
BACK PAIN: 0
NAUSEA: 1
SINUS PAIN: 0
ORTHOPNEA: 0
FEVER: 0
BRUISES/BLEEDS EASILY: 0
SPUTUM PRODUCTION: 0
CHILLS: 0
BLURRED VISION: 0
HEMOPTYSIS: 0
DIZZINESS: 0
MYALGIAS: 0
DEPRESSION: 0
DOUBLE VISION: 0
ABDOMINAL PAIN: 0

## 2019-10-04 NOTE — H&P
Hospital Medicine History & Physical Note    Date of Service  10/3/2019    Primary Care Physician  No primary care provider on file.    Consultants  None    Code Status  Full    Chief Complaint  Tremors and headaches    History of Presenting Illness  33 y.o. male who presented 10/3/2019 with history of alcohol abuse comes in after having binge drinking for the past 2 weeks.  Patient states that he been drinking whiskey.  His last drink was this morning.  Patient states that he also had been vomiting for the past 2 days.  Called his friend and states that he wanted to stop drinking and to bring him to the hospital.  Patient denies any fever, chills, abdominal pain, change in stools, change in urine.  Upon arrival to the emergency room blood pressure 136/78, heart rate 136.  Chest x-ray interpreted by me found no acute pulmonary process  Patient was started on CIWA protocol for alcohol withdrawal  Review of Systems  Review of Systems   Constitutional: Positive for chills, diaphoresis and malaise/fatigue. Negative for fever.   HENT: Negative for congestion, ear discharge, ear pain, hearing loss, nosebleeds, sinus pain, sore throat and tinnitus.    Eyes: Negative for blurred vision, double vision, photophobia and pain.   Respiratory: Negative for cough, hemoptysis, sputum production, shortness of breath, wheezing and stridor.    Cardiovascular: Negative for chest pain, palpitations, orthopnea, claudication, leg swelling and PND.   Gastrointestinal: Positive for heartburn, nausea and vomiting. Negative for abdominal pain, blood in stool, constipation, diarrhea and melena.   Genitourinary: Negative for dysuria, flank pain, frequency, hematuria and urgency.   Musculoskeletal: Negative for back pain, falls, joint pain, myalgias and neck pain.   Skin: Negative for itching and rash.   Neurological: Negative for dizziness, tingling, tremors, weakness and headaches.   Endo/Heme/Allergies: Negative for environmental allergies  and polydipsia. Does not bruise/bleed easily.   Psychiatric/Behavioral: Negative for depression, hallucinations, substance abuse and suicidal ideas.       Past Medical History  Alcohol abuse    Surgical History  Surgical history reviewed not pertinent    Family History  family history includes Hyperlipidemia in his father; No Known Problems in his brother and sister; Thyroid in his mother.     Social History   reports that he has never smoked. He has never used smokeless tobacco. He reports that he drinks alcohol. He reports that he does not use drugs.    Allergies  No Known Allergies    Medications  None       Physical Exam  Temp:  [36.6 °C (97.9 °F)-37.6 °C (99.6 °F)] 37.6 °C (99.6 °F)  Pulse:  [117-146] 145  Resp:  [14-24] 24  BP: (136-160)/() 148/97  SpO2:  [91 %-97 %] 95 %    Physical Exam   Constitutional: He is oriented to person, place, and time. He appears well-developed and well-nourished.   HENT:   Head: Normocephalic and atraumatic.   Mouth/Throat: No oropharyngeal exudate.   Eyes: Conjunctivae are normal. No scleral icterus.   Neck: Normal range of motion. Neck supple. No JVD present. No tracheal deviation present. No thyromegaly present.   Cardiovascular: Normal rate, regular rhythm and intact distal pulses. Exam reveals no gallop and no friction rub.   No murmur heard.  Pulmonary/Chest: Effort normal and breath sounds normal. No stridor. No respiratory distress. He has no wheezes. He has no rales. He exhibits no tenderness.   Abdominal: Soft. Bowel sounds are normal. He exhibits no distension and no mass. There is no tenderness. There is no rebound and no guarding.   Musculoskeletal: Normal range of motion. He exhibits no edema.   Lymphadenopathy:     He has no cervical adenopathy.   Neurological: He is alert and oriented to person, place, and time. He has normal reflexes.   Nursing note and vitals reviewed.      Laboratory:  Recent Labs     10/03/19  1508   WBC 9.9   RBC 4.99   HEMOGLOBIN 16.2    HEMATOCRIT 43.3   MCV 86.1   MCH 32.2   MCHC 37.4*   RDW 36.7   PLATELETCT 162*   MPV 11.7     Recent Labs     10/03/19  1508   SODIUM 134*   POTASSIUM 2.8*   CHLORIDE 93*   CO2 23   GLUCOSE 151*   BUN 16   CREATININE 0.90   CALCIUM 9.5     Recent Labs     10/03/19  1508   ALTSGPT 49   ASTSGOT 49*   ALKPHOSPHAT 63   TBILIRUBIN 1.4   LIPASE 39   GLUCOSE 151*         No results for input(s): NTPROBNP in the last 72 hours.      No results for input(s): TROPONINT in the last 72 hours.    Urinalysis:    No results found     Imaging:  DX-CHEST-PORTABLE (1 VIEW)   Final Result         1.  No acute cardiopulmonary disease.      US-ABDOMEN COMPLETE SURVEY    (Results Pending)         Assessment/Plan:  I anticipate this patient will require at least two midnights for appropriate medical management, necessitating inpatient admission.    * Alcohol dependence with withdrawal delirium (HCC)  Assessment & Plan  Patient will be admitted to the telemetry unit with close cardiac monitoring on CIWA protocol  Started on rally bag, multivitamin, thiamine and folate  Replete electrolytes  Patient has been counseled on alcohol cessation and will be provided with information for outpatient detox facilities  Patient had a distended abdomen and I have ordered ultrasound of the abdomen    Non-intractable vomiting with nausea  Assessment & Plan  Likely secondary from alcohol  Antinausea medications have been ordered    Hypophosphatemia  Assessment & Plan  Replaced with IV K-Phos    Hypomagnesemia  Assessment & Plan  Replaced with IV    Hypokalemia  Assessment & Plan  Severe  Replaced aggressively with IV potassium      VTE prophylaxis: lovenox

## 2019-10-04 NOTE — PROGRESS NOTES
2 RN skin check completed with Vinod AN.    Calluses noted on BL heels  Sacrum pink and blanching    Skin otherwise intact.

## 2019-10-04 NOTE — PROGRESS NOTES
Retrieved pt from ER. Pt placed on Zoll monitor. Pt tachycardic in 150-160s at this time; asymptomatic. Pt transported to unit; pt placed on tele monitor; monitor room notified. VSS, pt weighed. Pt A&Ox4; no complaints of pain. POC discussed; all questions answered. Fall and seizure precautions in place.

## 2019-10-04 NOTE — ED NOTES
Ambulated well to the bathroom   After patient had a BM he stated to the RN that he felt that there was blood in his stool, RN unable to witness  ED MD Ferreira made aware

## 2019-10-04 NOTE — ASSESSMENT & PLAN NOTE
Probably due to acute alcoholic hepatitis, bilirubin is 1.9 and US liver showed cbd dilatation, MRCP did not show obstruction.   lft's stable. Advised again about stopping drinking, he is planning to start alcohol rehab upon dc.

## 2019-10-04 NOTE — PROGRESS NOTES
[facility] Medicine Daily Progress Note    Date of Service  10/4/2019    Chief Complaint  33 y.o. male admitted 10/3/2019 with alcohol intoxication and withdrawal      Interval Problem Update  Patient in bed still feels weak, mild RUQ pain, no fever or chills, some nausea but not vomiting.   Discussed with patient regarding plan of care all questions answered.     Consultants/Specialty  none    Code Status  Full code    Disposition  Tbd.     Review of Systems  Review of Systems   Constitutional: Positive for malaise/fatigue. Negative for chills and fever.   HENT: Negative for congestion and sinus pain.    Eyes: Negative for blurred vision and double vision.   Respiratory: Negative for cough, hemoptysis and sputum production.    Cardiovascular: Negative for chest pain, palpitations and orthopnea.   Gastrointestinal: Positive for nausea. Negative for abdominal pain, heartburn and vomiting.   Genitourinary: Negative for dysuria, frequency and urgency.   Musculoskeletal: Negative for back pain and myalgias.   Skin: Negative for itching.   Neurological: Negative for dizziness and headaches.   Endo/Heme/Allergies: Does not bruise/bleed easily.   Psychiatric/Behavioral: Negative for depression, substance abuse and suicidal ideas.        Physical Exam  Temp:  [36.8 °C (98.2 °F)-37.6 °C (99.6 °F)] 36.8 °C (98.2 °F)  Pulse:  [105-146] 122  Resp:  [14-24] 16  BP: (136-160)/() 137/98  SpO2:  [91 %-97 %] 94 %    Physical Exam   Constitutional: He is oriented to person, place, and time. He appears well-nourished. No distress.   HENT:   Head: Normocephalic and atraumatic.   Mouth/Throat: No oropharyngeal exudate.   Eyes: Conjunctivae are normal. Right eye exhibits no discharge. Left eye exhibits no discharge. No scleral icterus.   Neck: Normal range of motion. Neck supple. No JVD present.   Cardiovascular: Regular rhythm and normal heart sounds. Exam reveals no friction rub.   Pulmonary/Chest: Effort normal and breath sounds  normal. No stridor. No respiratory distress. He has no rales.   Abdominal: Soft. Bowel sounds are normal. He exhibits no distension. There is no tenderness. There is no rebound.   Musculoskeletal: Normal range of motion. He exhibits no edema.   Lymphadenopathy:     He has no cervical adenopathy.   Neurological: He is alert and oriented to person, place, and time. He exhibits normal muscle tone.   Skin: Skin is dry. No erythema.   Psychiatric: He has a normal mood and affect. His behavior is normal.   Nursing note and vitals reviewed.      Fluids    Intake/Output Summary (Last 24 hours) at 10/4/2019 1417  Last data filed at 10/3/2019 1933  Gross per 24 hour   Intake 2000 ml   Output --   Net 2000 ml       Laboratory  Recent Labs     10/03/19  1508 10/04/19  0150   WBC 9.9 9.5   RBC 4.99 4.62*   HEMOGLOBIN 16.2 14.4   HEMATOCRIT 43.3 39.4*   MCV 86.1 85.3   MCH 32.2 31.2   MCHC 37.4* 36.5*   RDW 36.7 36.2   PLATELETCT 162* 134*   MPV 11.7 11.9     Recent Labs     10/03/19  1508 10/04/19  0150   SODIUM 134* 136   POTASSIUM 2.8* 2.8*   CHLORIDE 93* 98   CO2 23 25   GLUCOSE 151* 122*   BUN 16 10   CREATININE 0.90 0.76   CALCIUM 9.5 7.6*                   Imaging  US-ABDOMEN COMPLETE SURVEY   Final Result         1.  Gallbladder sludge.   2.  Common bile duct dilatation, consider distal obstructing stone, stenosis, or ampullary lesion. Could be further evaluated with ERCP or MRCP.   3.  Hepatomegaly and echogenic liver with irregular hepatic contour, appearance compatible with changes of cirrhosis.   4.  Innumerable renal cysts and enlargement of the kidneys, appearance compatible with polycystic kidney disease. Could be further characterized with CT of the abdomen.   5.  Distended bladder, consider bladder atony or component of bladder outlet obstruction.         DX-CHEST-PORTABLE (1 VIEW)   Final Result         1.  No acute cardiopulmonary disease.      [id number]    (Results Pending)        Assessment/Plan  *  Alcohol dependence with withdrawal delirium (HCC)  Assessment & Plan  Continue ciwa protocol. Had long discussing with patient regarding alcohol abuse and liver damage. Patient is planning to quit drinking and will enter a rehab program when dc from hospital.     Thrombocytopenia (HCC)- (present on admission)  Assessment & Plan  Due to alcohol abuse, continue monitoring no need for transfusion, no signs of bleeding.     Elevated LFTs  Assessment & Plan  Probably due to acute alcoholic hepatitis, bilirubin is 1.9 and US liver showed cbd dilatation, will get MRCP and GI consult pending results.     Non-intractable vomiting with nausea  Assessment & Plan  Due to alcohol abuse, improved, will start famotidine iv for now will change to po after MRI.     Hypophosphatemia  Assessment & Plan  Replacing. Will f/u in am.     Hypomagnesemia  Assessment & Plan  Replacing will fu in am.     Hypokalemia  Assessment & Plan  Will recheck today, received iv K  And po K this am       VTE prophylaxis: scd's

## 2019-10-04 NOTE — ASSESSMENT & PLAN NOTE
Due to alcohol abuse, continue monitoring no need for transfusion, no signs of bleeding.   Slightly lower today, f/u in am.

## 2019-10-04 NOTE — CARE PLAN
Problem: Knowledge Deficit  Goal: Knowledge of the prescribed therapeutic regimen will improve  Outcome: PROGRESSING AS EXPECTED   Pt verbalizes understanding of medication regimen based on admitting diagnosis.    Problem: Psychosocial Needs:  Goal: Level of anxiety will decrease  Outcome: PROGRESSING AS EXPECTED   Pt anxiety level diminished throughout shift.

## 2019-10-04 NOTE — CARE PLAN
Educated patient on the importance of good hand hygiene for self and staff, verbalized understanding.

## 2019-10-04 NOTE — ED NOTES
Hand off report given to Madhu AN   Patient chart and current status reviewed with oncoming RN and all questions answered  This RN's primary care of patient terminated at this time

## 2019-10-05 LAB
ALBUMIN SERPL BCP-MCNC: 3.9 G/DL (ref 3.2–4.9)
ALBUMIN/GLOB SERPL: 1.6 G/DL
ALP SERPL-CCNC: 72 U/L (ref 30–99)
ALT SERPL-CCNC: 44 U/L (ref 2–50)
ANION GAP SERPL CALC-SCNC: 10 MMOL/L (ref 0–11.9)
AST SERPL-CCNC: 51 U/L (ref 12–45)
BASOPHILS # BLD AUTO: 0.5 % (ref 0–1.8)
BASOPHILS # BLD: 0.03 K/UL (ref 0–0.12)
BILIRUB SERPL-MCNC: 1.9 MG/DL (ref 0.1–1.5)
BUN SERPL-MCNC: 10 MG/DL (ref 8–22)
CALCIUM SERPL-MCNC: 8 MG/DL (ref 8.5–10.5)
CHLORIDE SERPL-SCNC: 101 MMOL/L (ref 96–112)
CO2 SERPL-SCNC: 23 MMOL/L (ref 20–33)
CREAT SERPL-MCNC: 0.83 MG/DL (ref 0.5–1.4)
EOSINOPHIL # BLD AUTO: 0.06 K/UL (ref 0–0.51)
EOSINOPHIL NFR BLD: 0.9 % (ref 0–6.9)
ERYTHROCYTE [DISTWIDTH] IN BLOOD BY AUTOMATED COUNT: 37.3 FL (ref 35.9–50)
GLOBULIN SER CALC-MCNC: 2.4 G/DL (ref 1.9–3.5)
GLUCOSE SERPL-MCNC: 112 MG/DL (ref 65–99)
HCT VFR BLD AUTO: 39.8 % (ref 42–52)
HGB BLD-MCNC: 14.1 G/DL (ref 14–18)
IMM GRANULOCYTES # BLD AUTO: 0.01 K/UL (ref 0–0.11)
IMM GRANULOCYTES NFR BLD AUTO: 0.2 % (ref 0–0.9)
LYMPHOCYTES # BLD AUTO: 1.1 K/UL (ref 1–4.8)
LYMPHOCYTES NFR BLD: 17.2 % (ref 22–41)
MAGNESIUM SERPL-MCNC: 1.8 MG/DL (ref 1.5–2.5)
MCH RBC QN AUTO: 31.1 PG (ref 27–33)
MCHC RBC AUTO-ENTMCNC: 35.4 G/DL (ref 33.7–35.3)
MCV RBC AUTO: 87.9 FL (ref 81.4–97.8)
MONOCYTES # BLD AUTO: 0.4 K/UL (ref 0–0.85)
MONOCYTES NFR BLD AUTO: 6.3 % (ref 0–13.4)
NEUTROPHILS # BLD AUTO: 4.8 K/UL (ref 1.82–7.42)
NEUTROPHILS NFR BLD: 74.9 % (ref 44–72)
NRBC # BLD AUTO: 0 K/UL
NRBC BLD-RTO: 0 /100 WBC
PHOSPHATE SERPL-MCNC: 2.5 MG/DL (ref 2.5–4.5)
PLATELET # BLD AUTO: 104 K/UL (ref 164–446)
PMV BLD AUTO: 11.4 FL (ref 9–12.9)
POTASSIUM SERPL-SCNC: 2.8 MMOL/L (ref 3.6–5.5)
POTASSIUM SERPL-SCNC: 3.1 MMOL/L (ref 3.6–5.5)
PROT SERPL-MCNC: 6.3 G/DL (ref 6–8.2)
RBC # BLD AUTO: 4.53 M/UL (ref 4.7–6.1)
SODIUM SERPL-SCNC: 134 MMOL/L (ref 135–145)
WBC # BLD AUTO: 6.4 K/UL (ref 4.8–10.8)

## 2019-10-05 PROCEDURE — 85025 COMPLETE CBC W/AUTO DIFF WBC: CPT

## 2019-10-05 PROCEDURE — A9270 NON-COVERED ITEM OR SERVICE: HCPCS | Performed by: INTERNAL MEDICINE

## 2019-10-05 PROCEDURE — 700105 HCHG RX REV CODE 258: Performed by: HOSPITALIST

## 2019-10-05 PROCEDURE — 36415 COLL VENOUS BLD VENIPUNCTURE: CPT

## 2019-10-05 PROCEDURE — 700102 HCHG RX REV CODE 250 W/ 637 OVERRIDE(OP): Performed by: HOSPITALIST

## 2019-10-05 PROCEDURE — 700102 HCHG RX REV CODE 250 W/ 637 OVERRIDE(OP): Performed by: INTERNAL MEDICINE

## 2019-10-05 PROCEDURE — 700111 HCHG RX REV CODE 636 W/ 250 OVERRIDE (IP): Performed by: INTERNAL MEDICINE

## 2019-10-05 PROCEDURE — 84100 ASSAY OF PHOSPHORUS: CPT

## 2019-10-05 PROCEDURE — 700111 HCHG RX REV CODE 636 W/ 250 OVERRIDE (IP): Performed by: HOSPITALIST

## 2019-10-05 PROCEDURE — 84132 ASSAY OF SERUM POTASSIUM: CPT

## 2019-10-05 PROCEDURE — 99232 SBSQ HOSP IP/OBS MODERATE 35: CPT | Performed by: HOSPITALIST

## 2019-10-05 PROCEDURE — A9270 NON-COVERED ITEM OR SERVICE: HCPCS | Performed by: HOSPITALIST

## 2019-10-05 PROCEDURE — 770020 HCHG ROOM/CARE - TELE (206)

## 2019-10-05 PROCEDURE — 83735 ASSAY OF MAGNESIUM: CPT

## 2019-10-05 PROCEDURE — 80053 COMPREHEN METABOLIC PANEL: CPT

## 2019-10-05 RX ORDER — POTASSIUM CHLORIDE 7.45 MG/ML
10 INJECTION INTRAVENOUS
Status: COMPLETED | OUTPATIENT
Start: 2019-10-05 | End: 2019-10-05

## 2019-10-05 RX ORDER — LORAZEPAM 1 MG/1
0.5 TABLET ORAL EVERY 8 HOURS PRN
Status: DISCONTINUED | OUTPATIENT
Start: 2019-10-05 | End: 2019-10-06 | Stop reason: HOSPADM

## 2019-10-05 RX ORDER — POTASSIUM CHLORIDE 20 MEQ/1
40 TABLET, EXTENDED RELEASE ORAL ONCE
Status: COMPLETED | OUTPATIENT
Start: 2019-10-05 | End: 2019-10-05

## 2019-10-05 RX ORDER — POTASSIUM CHLORIDE 20 MEQ/1
40 TABLET, EXTENDED RELEASE ORAL ONCE
Status: DISCONTINUED | OUTPATIENT
Start: 2019-10-05 | End: 2019-10-05

## 2019-10-05 RX ADMIN — MAGNESIUM GLUCONATE 500 MG ORAL TABLET 400 MG: 500 TABLET ORAL at 13:08

## 2019-10-05 RX ADMIN — LORAZEPAM 0.5 MG: 1 TABLET ORAL at 19:29

## 2019-10-05 RX ADMIN — LORAZEPAM 1 MG: 1 TABLET ORAL at 00:40

## 2019-10-05 RX ADMIN — Medication 100 MG: at 05:20

## 2019-10-05 RX ADMIN — THERA TABS 1 TABLET: TAB at 05:20

## 2019-10-05 RX ADMIN — POTASSIUM CHLORIDE 10 MEQ: 7.46 INJECTION, SOLUTION INTRAVENOUS at 05:20

## 2019-10-05 RX ADMIN — FOLIC ACID 1 MG: 1 TABLET ORAL at 05:20

## 2019-10-05 RX ADMIN — SODIUM CHLORIDE: 9 INJECTION, SOLUTION INTRAVENOUS at 13:01

## 2019-10-05 RX ADMIN — POTASSIUM CHLORIDE 40 MEQ: 1500 TABLET, EXTENDED RELEASE ORAL at 05:19

## 2019-10-05 RX ADMIN — FAMOTIDINE 20 MG: 10 INJECTION INTRAVENOUS at 05:20

## 2019-10-05 RX ADMIN — POTASSIUM CHLORIDE 40 MEQ: 1500 TABLET, EXTENDED RELEASE ORAL at 13:01

## 2019-10-05 RX ADMIN — SODIUM CHLORIDE: 9 INJECTION, SOLUTION INTRAVENOUS at 03:06

## 2019-10-05 RX ADMIN — POTASSIUM CHLORIDE 10 MEQ: 7.46 INJECTION, SOLUTION INTRAVENOUS at 06:30

## 2019-10-05 RX ADMIN — FAMOTIDINE 20 MG: 10 INJECTION INTRAVENOUS at 17:16

## 2019-10-05 ASSESSMENT — LIFESTYLE VARIABLES
NAUSEA AND VOMITING: NO NAUSEA AND NO VOMITING
AUDITORY DISTURBANCES: NOT PRESENT
AGITATION: NORMAL ACTIVITY
TOTAL SCORE: 8
ANXIETY: MODERATELY ANXIOUS OR GUARDED, SO ANXIETY IS INFERRED
PAROXYSMAL SWEATS: NO SWEAT VISIBLE
ANXIETY: MODERATELY ANXIOUS OR GUARDED, SO ANXIETY IS INFERRED
ANXIETY: MODERATELY ANXIOUS OR GUARDED, SO ANXIETY IS INFERRED
TREMOR: NO TREMOR
PAROXYSMAL SWEATS: NO SWEAT VISIBLE
AUDITORY DISTURBANCES: NOT PRESENT
ORIENTATION AND CLOUDING OF SENSORIUM: ORIENTED AND CAN DO SERIAL ADDITIONS
HEADACHE, FULLNESS IN HEAD: NOT PRESENT
VISUAL DISTURBANCES: NOT PRESENT
TOTAL SCORE: 3
PAROXYSMAL SWEATS: NO SWEAT VISIBLE
HEADACHE, FULLNESS IN HEAD: NOT PRESENT
AUDITORY DISTURBANCES: NOT PRESENT
PAROXYSMAL SWEATS: BARELY PERCEPTIBLE SWEATING. PALMS MOIST
TOTAL SCORE: VERY MILD ITCHING, PINS AND NEEDLES SENSATION, BURNING OR NUMBNESS
TREMOR: NO TREMOR
AUDITORY DISTURBANCES: NOT PRESENT
ORIENTATION AND CLOUDING OF SENSORIUM: ORIENTED AND CAN DO SERIAL ADDITIONS
AUDITORY DISTURBANCES: NOT PRESENT
TREMOR: NO TREMOR
PAROXYSMAL SWEATS: NO SWEAT VISIBLE
NAUSEA AND VOMITING: NO NAUSEA AND NO VOMITING
TREMOR: NO TREMOR
TOTAL SCORE: 3
NAUSEA AND VOMITING: NO NAUSEA AND NO VOMITING
AUDITORY DISTURBANCES: NOT PRESENT
TOTAL SCORE: 3
NAUSEA AND VOMITING: NO NAUSEA AND NO VOMITING
VISUAL DISTURBANCES: NOT PRESENT
TREMOR: NO TREMOR
ORIENTATION AND CLOUDING OF SENSORIUM: ORIENTED AND CAN DO SERIAL ADDITIONS
HEADACHE, FULLNESS IN HEAD: NOT PRESENT
AGITATION: NORMAL ACTIVITY
TREMOR: NO TREMOR
VISUAL DISTURBANCES: NOT PRESENT
VISUAL DISTURBANCES: NOT PRESENT
HEADACHE, FULLNESS IN HEAD: NOT PRESENT
VISUAL DISTURBANCES: NOT PRESENT
ORIENTATION AND CLOUDING OF SENSORIUM: ORIENTED AND CAN DO SERIAL ADDITIONS
VISUAL DISTURBANCES: NOT PRESENT
ORIENTATION AND CLOUDING OF SENSORIUM: ORIENTED AND CAN DO SERIAL ADDITIONS
AGITATION: SOMEWHAT MORE THAN NORMAL ACTIVITY
NAUSEA AND VOMITING: NO NAUSEA AND NO VOMITING
ORIENTATION AND CLOUDING OF SENSORIUM: ORIENTED AND CAN DO SERIAL ADDITIONS
AGITATION: NORMAL ACTIVITY
AGITATION: SOMEWHAT MORE THAN NORMAL ACTIVITY
TOTAL SCORE: MILD ITCHING, PINS AND NEEDLES SENSATION, BURNING OR NUMBNESS
TOTAL SCORE: 4
HEADACHE, FULLNESS IN HEAD: NOT PRESENT
HEADACHE, FULLNESS IN HEAD: NOT PRESENT
AGITATION: NORMAL ACTIVITY
VISUAL DISTURBANCES: NOT PRESENT
ANXIETY: MILDLY ANXIOUS
TOTAL SCORE: 4
PAROXYSMAL SWEATS: NO SWEAT VISIBLE
HEADACHE, FULLNESS IN HEAD: NOT PRESENT
NAUSEA AND VOMITING: NO NAUSEA AND NO VOMITING
TREMOR: NO TREMOR
AUDITORY DISTURBANCES: NOT PRESENT
PAROXYSMAL SWEATS: NO SWEAT VISIBLE
NAUSEA AND VOMITING: NO NAUSEA AND NO VOMITING
ANXIETY: MODERATELY ANXIOUS OR GUARDED, SO ANXIETY IS INFERRED
TOTAL SCORE: 4
ANXIETY: MILDLY ANXIOUS
ANXIETY: *
TOTAL SCORE: VERY MILD ITCHING, PINS AND NEEDLES SENSATION, BURNING OR NUMBNESS
AGITATION: SOMEWHAT MORE THAN NORMAL ACTIVITY
ORIENTATION AND CLOUDING OF SENSORIUM: ORIENTED AND CAN DO SERIAL ADDITIONS

## 2019-10-05 ASSESSMENT — COGNITIVE AND FUNCTIONAL STATUS - GENERAL
DAILY ACTIVITIY SCORE: 24
SUGGESTED CMS G CODE MODIFIER MOBILITY: CH
SUGGESTED CMS G CODE MODIFIER DAILY ACTIVITY: CH
MOBILITY SCORE: 24

## 2019-10-05 ASSESSMENT — ENCOUNTER SYMPTOMS
COUGH: 0
HEMOPTYSIS: 0
BRUISES/BLEEDS EASILY: 0
HEARTBURN: 0
BACK PAIN: 0
PALPITATIONS: 0
FEVER: 0
VOMITING: 0
SINUS PAIN: 0
DEPRESSION: 0
ABDOMINAL PAIN: 0
HEADACHES: 0
DIZZINESS: 0
NAUSEA: 1
MYALGIAS: 0
BLURRED VISION: 0
DOUBLE VISION: 0

## 2019-10-05 ASSESSMENT — PATIENT HEALTH QUESTIONNAIRE - PHQ9
1. LITTLE INTEREST OR PLEASURE IN DOING THINGS: NOT AT ALL
SUM OF ALL RESPONSES TO PHQ9 QUESTIONS 1 AND 2: 0
2. FEELING DOWN, DEPRESSED, IRRITABLE, OR HOPELESS: NOT AT ALL

## 2019-10-05 NOTE — PROGRESS NOTES
Report received from Gregoria. MRI completed and resulted. Potassium at 2.9. Pt awake in bed; no complaints at this time. POC discussed; all questions answered. Bed locked in lowest position; call light in reach; seizure and fall precautions in place.

## 2019-10-05 NOTE — CARE PLAN
Problem: Safety  Goal: Will remain free from injury  Outcome: PROGRESSING AS EXPECTED     Problem: Bowel/Gastric:  Goal: Normal bowel function is maintained or improved  Outcome: PROGRESSING AS EXPECTED     Problem: Knowledge Deficit  Goal: Knowledge of disease process/condition, treatment plan, diagnostic tests, and medications will improve  Outcome: PROGRESSING AS EXPECTED

## 2019-10-05 NOTE — PROGRESS NOTES
[facility] Medicine Daily Progress Note    Date of Service  10/5/2019    Chief Complaint  33 y.o. male admitted 10/3/2019 with alcohol intoxication and withdrawal      Interval Problem Update  Patient is resting in bed, no new complains, feeling better, tolerating diet no fever or chills, still tachycardic asymptomatic.   K still low.       Consultants/Specialty  none    Code Status  Full code    Disposition  Home in am.     Review of Systems  Review of Systems   Constitutional: Negative for fever and malaise/fatigue.   HENT: Negative for congestion and sinus pain.    Eyes: Negative for blurred vision and double vision.   Respiratory: Negative for cough and hemoptysis.    Cardiovascular: Negative for chest pain and palpitations.   Gastrointestinal: Positive for nausea. Negative for abdominal pain, heartburn and vomiting.   Genitourinary: Negative for dysuria and urgency.   Musculoskeletal: Negative for back pain and myalgias.   Skin: Negative for itching.   Neurological: Negative for dizziness and headaches.   Endo/Heme/Allergies: Does not bruise/bleed easily.   Psychiatric/Behavioral: Negative for depression and suicidal ideas.        Physical Exam  Temp:  [36.8 °C (98.3 °F)-37.6 °C (99.6 °F)] 36.8 °C (98.3 °F)  Pulse:  [] 93  Resp:  [16-18] 16  BP: (135-148)/(100-109) 143/101  SpO2:  [94 %-96 %] 95 %    Physical Exam   Constitutional: He is oriented to person, place, and time. He appears well-nourished. No distress.   HENT:   Head: Normocephalic and atraumatic.   Mouth/Throat: No oropharyngeal exudate.   Eyes: Conjunctivae are normal. No scleral icterus.   Neck: Normal range of motion. Neck supple.   Cardiovascular: Regular rhythm and normal heart sounds. Exam reveals no friction rub.   Pulmonary/Chest: Effort normal and breath sounds normal. No respiratory distress. He has no wheezes.   Abdominal: Soft. Bowel sounds are normal. He exhibits no distension. There is no tenderness. There is no rebound.    Musculoskeletal: Normal range of motion. He exhibits no edema.   Lymphadenopathy:     He has no cervical adenopathy.   Neurological: He is alert and oriented to person, place, and time. He exhibits normal muscle tone.   Skin: Skin is dry. No erythema.   Psychiatric: He has a normal mood and affect. His behavior is normal.   Nursing note and vitals reviewed.      Fluids    Intake/Output Summary (Last 24 hours) at 10/5/2019 1426  Last data filed at 10/4/2019 2200  Gross per 24 hour   Intake --   Output 800 ml   Net -800 ml       Laboratory  Recent Labs     10/03/19  1508 10/04/19  0150 10/05/19  0248   WBC 9.9 9.5 6.4   RBC 4.99 4.62* 4.53*   HEMOGLOBIN 16.2 14.4 14.1   HEMATOCRIT 43.3 39.4* 39.8*   MCV 86.1 85.3 87.9   MCH 32.2 31.2 31.1   MCHC 37.4* 36.5* 35.4*   RDW 36.7 36.2 37.3   PLATELETCT 162* 134* 104*   MPV 11.7 11.9 11.4     Recent Labs     10/03/19  1508 10/04/19  0150 10/04/19  1428 10/05/19  0248 10/05/19  1036   SODIUM 134* 136  --  134*  --    POTASSIUM 2.8* 2.8* 2.9* 2.8* 3.1*   CHLORIDE 93* 98  --  101  --    CO2 23 25  --  23  --    GLUCOSE 151* 122*  --  112*  --    BUN 16 10  --  10  --    CREATININE 0.90 0.76  --  0.83  --    CALCIUM 9.5 7.6*  --  8.0*  --                    Imaging  [id number]   Final Result      1.  No choledocholithiasis is identified.      2.  No evidence for biliary obstruction.      3.  Enlarged multicystic kidneys, consistent with autosomal dominant polycystic kidney disease.      4.  Multiple subcentimeter hepatic cysts.      5.  Diverticulosis      US-ABDOMEN COMPLETE SURVEY   Final Result         1.  Gallbladder sludge.   2.  Common bile duct dilatation, consider distal obstructing stone, stenosis, or ampullary lesion. Could be further evaluated with ERCP or MRCP.   3.  Hepatomegaly and echogenic liver with irregular hepatic contour, appearance compatible with changes of cirrhosis.   4.  Innumerable renal cysts and enlargement of the kidneys, appearance compatible  with polycystic kidney disease. Could be further characterized with CT of the abdomen.   5.  Distended bladder, consider bladder atony or component of bladder outlet obstruction.         DX-CHEST-PORTABLE (1 VIEW)   Final Result         1.  No acute cardiopulmonary disease.           Assessment/Plan  * Alcohol dependence with withdrawal delirium (HCC)  Assessment & Plan  Continue ciwa protocol. ciwa improved.   Continue monitoring.     Thrombocytopenia (HCC)- (present on admission)  Assessment & Plan  Due to alcohol abuse, continue monitoring no need for transfusion, no signs of bleeding.   Slightly lower today, f/u in am.    Elevated LFTs  Assessment & Plan  Probably due to acute alcoholic hepatitis, bilirubin is 1.9 and US liver showed cbd dilatation, MRCP did not show obstruction.   lft's stable. Advised again about stopping drinking, he is planning to start alcohol rehab upon dc.     Non-intractable vomiting with nausea  Assessment & Plan  Due to alcohol abuse, tolerating diet.      Hypophosphatemia  Assessment & Plan  replaced    Hypomagnesemia  Assessment & Plan  Better, continue po supplement.     Hypokalemia  Assessment & Plan  Still low but improved, continue po supplement. Added Mg supplement.        VTE prophylaxis: scd's

## 2019-10-05 NOTE — CARE PLAN
Problem: Communication  Goal: The ability to communicate needs accurately and effectively will improve  Outcome: PROGRESSING AS EXPECTED   Pt spoke with RN about anxiety level surrounding hospitalization. Educated pt on coping mechanisms.    Problem: Safety  Goal: Will remain free from falls  Outcome: PROGRESSING AS EXPECTED   Pt calling staff appropriately prior to ambulating.

## 2019-10-06 VITALS
TEMPERATURE: 98.5 F | SYSTOLIC BLOOD PRESSURE: 147 MMHG | DIASTOLIC BLOOD PRESSURE: 103 MMHG | WEIGHT: 180.78 LBS | RESPIRATION RATE: 16 BRPM | HEART RATE: 108 BPM | OXYGEN SATURATION: 96 % | BODY MASS INDEX: 25.31 KG/M2 | HEIGHT: 71 IN

## 2019-10-06 LAB
BASOPHILS # BLD AUTO: 0.6 % (ref 0–1.8)
BASOPHILS # BLD: 0.04 K/UL (ref 0–0.12)
EKG IMPRESSION: NORMAL
EOSINOPHIL # BLD AUTO: 0.25 K/UL (ref 0–0.51)
EOSINOPHIL NFR BLD: 3.6 % (ref 0–6.9)
ERYTHROCYTE [DISTWIDTH] IN BLOOD BY AUTOMATED COUNT: 37.6 FL (ref 35.9–50)
HCT VFR BLD AUTO: 42.1 % (ref 42–52)
HGB BLD-MCNC: 15.3 G/DL (ref 14–18)
IMM GRANULOCYTES # BLD AUTO: 0.04 K/UL (ref 0–0.11)
IMM GRANULOCYTES NFR BLD AUTO: 0.6 % (ref 0–0.9)
LYMPHOCYTES # BLD AUTO: 1.22 K/UL (ref 1–4.8)
LYMPHOCYTES NFR BLD: 17.7 % (ref 22–41)
MCH RBC QN AUTO: 31.9 PG (ref 27–33)
MCHC RBC AUTO-ENTMCNC: 36.3 G/DL (ref 33.7–35.3)
MCV RBC AUTO: 87.7 FL (ref 81.4–97.8)
MONOCYTES # BLD AUTO: 0.41 K/UL (ref 0–0.85)
MONOCYTES NFR BLD AUTO: 6 % (ref 0–13.4)
NEUTROPHILS # BLD AUTO: 4.92 K/UL (ref 1.82–7.42)
NEUTROPHILS NFR BLD: 71.5 % (ref 44–72)
NRBC # BLD AUTO: 0 K/UL
NRBC BLD-RTO: 0 /100 WBC
PLATELET # BLD AUTO: 110 K/UL (ref 164–446)
PMV BLD AUTO: 11.1 FL (ref 9–12.9)
POTASSIUM SERPL-SCNC: 3 MMOL/L (ref 3.6–5.5)
POTASSIUM SERPL-SCNC: 3.7 MMOL/L (ref 3.6–5.5)
RBC # BLD AUTO: 4.8 M/UL (ref 4.7–6.1)
WBC # BLD AUTO: 6.9 K/UL (ref 4.8–10.8)

## 2019-10-06 PROCEDURE — 700102 HCHG RX REV CODE 250 W/ 637 OVERRIDE(OP): Performed by: HOSPITALIST

## 2019-10-06 PROCEDURE — A9270 NON-COVERED ITEM OR SERVICE: HCPCS | Performed by: HOSPITALIST

## 2019-10-06 PROCEDURE — 93005 ELECTROCARDIOGRAM TRACING: CPT | Performed by: HOSPITALIST

## 2019-10-06 PROCEDURE — 84132 ASSAY OF SERUM POTASSIUM: CPT

## 2019-10-06 PROCEDURE — 85025 COMPLETE CBC W/AUTO DIFF WBC: CPT

## 2019-10-06 PROCEDURE — 700111 HCHG RX REV CODE 636 W/ 250 OVERRIDE (IP): Performed by: HOSPITALIST

## 2019-10-06 PROCEDURE — 93010 ELECTROCARDIOGRAM REPORT: CPT | Performed by: INTERNAL MEDICINE

## 2019-10-06 PROCEDURE — 99239 HOSP IP/OBS DSCHRG MGMT >30: CPT | Performed by: HOSPITALIST

## 2019-10-06 PROCEDURE — 36415 COLL VENOUS BLD VENIPUNCTURE: CPT

## 2019-10-06 RX ORDER — LANOLIN ALCOHOL/MO/W.PET/CERES
100 CREAM (GRAM) TOPICAL DAILY
Qty: 30 TAB | Refills: 0 | Status: SHIPPED | OUTPATIENT
Start: 2019-10-07 | End: 2021-05-13

## 2019-10-06 RX ORDER — LORAZEPAM 2 MG/1
2 TABLET ORAL ONCE
Status: COMPLETED | OUTPATIENT
Start: 2019-10-06 | End: 2019-10-06

## 2019-10-06 RX ORDER — POTASSIUM CHLORIDE 20 MEQ/1
40 TABLET, EXTENDED RELEASE ORAL ONCE
Status: COMPLETED | OUTPATIENT
Start: 2019-10-06 | End: 2019-10-06

## 2019-10-06 RX ORDER — FOLIC ACID 1 MG/1
1 TABLET ORAL DAILY
Qty: 30 TAB | Refills: 0 | Status: SHIPPED | OUTPATIENT
Start: 2019-10-07 | End: 2021-05-12

## 2019-10-06 RX ADMIN — LORAZEPAM 2 MG: 2 TABLET ORAL at 05:21

## 2019-10-06 RX ADMIN — FAMOTIDINE 20 MG: 10 INJECTION INTRAVENOUS at 05:22

## 2019-10-06 RX ADMIN — MAGNESIUM GLUCONATE 500 MG ORAL TABLET 400 MG: 500 TABLET ORAL at 05:22

## 2019-10-06 RX ADMIN — LORAZEPAM 1 MG: 1 TABLET ORAL at 02:04

## 2019-10-06 RX ADMIN — FOLIC ACID 1 MG: 1 TABLET ORAL at 05:22

## 2019-10-06 RX ADMIN — THERA TABS 1 TABLET: TAB at 05:22

## 2019-10-06 RX ADMIN — Medication 100 MG: at 05:22

## 2019-10-06 RX ADMIN — POTASSIUM CHLORIDE 40 MEQ: 1500 TABLET, EXTENDED RELEASE ORAL at 09:00

## 2019-10-06 ASSESSMENT — LIFESTYLE VARIABLES
TREMOR: NO TREMOR
AGITATION: SOMEWHAT MORE THAN NORMAL ACTIVITY
TOTAL SCORE: 4
AGITATION: NORMAL ACTIVITY
TOTAL SCORE: 8
ORIENTATION AND CLOUDING OF SENSORIUM: ORIENTED AND CAN DO SERIAL ADDITIONS
VISUAL DISTURBANCES: NOT PRESENT
AUDITORY DISTURBANCES: NOT PRESENT
PAROXYSMAL SWEATS: *
HEADACHE, FULLNESS IN HEAD: VERY MILD
AUDITORY DISTURBANCES: NOT PRESENT
ANXIETY: *
ORIENTATION AND CLOUDING OF SENSORIUM: ORIENTED AND CAN DO SERIAL ADDITIONS
NAUSEA AND VOMITING: NO NAUSEA AND NO VOMITING
ANXIETY: MODERATELY ANXIOUS OR GUARDED, SO ANXIETY IS INFERRED
VISUAL DISTURBANCES: NOT PRESENT
PAROXYSMAL SWEATS: BARELY PERCEPTIBLE SWEATING. PALMS MOIST
TREMOR: NO TREMOR
NAUSEA AND VOMITING: NO NAUSEA AND NO VOMITING
HEADACHE, FULLNESS IN HEAD: NOT PRESENT

## 2019-10-06 NOTE — PROGRESS NOTES
Pt is being discharged home, leaving the unit independently with spouse. Pt verbally acknowledges all discharge instructions including establishing and following up with new PCP, medications, and medications regimen. Pt gathered all personal belongings, Tele box and IV have been removed. All questions and needs have been met at this time.

## 2019-10-06 NOTE — CARE PLAN
"  Problem: Safety  Goal: Will remain free from falls  Outcome: PROGRESSING AS EXPECTED   Pt calls for staff prior to ambulating.    Problem: Bowel/Gastric:  Goal: Normal bowel function is maintained or improved  Outcome: PROGRESSING AS EXPECTED   Pt states stool \"more formed today.\"    Problem: Psychosocial Needs:  Goal: Level of anxiety will decrease  Outcome: PROGRESSING SLOWER THAN EXPECTED   Pt still experiencing anxiety.  "

## 2019-10-06 NOTE — DISCHARGE INSTRUCTIONS
Discharge Instructions    Discharged to home by car with relative. Discharged via walking, hospital escort: Refused.  Special equipment needed: Not Applicable    Be sure to schedule a follow-up appointment with your primary care doctor or any specialists as instructed.     Discharge Plan:   Diet Plan: Discussed  Activity Level: Discussed  Smoking Cessation Offered: Patient Refused  Confirmed Follow up Appointment: Patient to Call and Schedule Appointment  Confirmed Symptoms Management: Discussed  Medication Reconciliation Updated: Yes  Influenza Vaccine Indication: Patient Refuses(pt states he will follow up with PCP)    I understand that a diet low in cholesterol, fat, and sodium is recommended for good health. Unless I have been given specific instructions below for another diet, I accept this instruction as my diet prescription.   Other diet: Regular as tolerated, refrain from alcohol    Special Instructions: None    · Is patient discharged on Warfarin / Coumadin?   No     Depression / Suicide Risk    As you are discharged from this [facility] facility, it is important to learn how to keep safe from harming yourself.    Recognize the warning signs:  · Abrupt changes in personality, positive or negative- including increase in energy   · Giving away possessions  · Change in eating patterns- significant weight changes-  positive or negative  · Change in sleeping patterns- unable to sleep or sleeping all the time   · Unwillingness or inability to communicate  · Depression  · Unusual sadness, discouragement and loneliness  · Talk of wanting to die  · Neglect of personal appearance   · Rebelliousness- reckless behavior  · Withdrawal from people/activities they love  · Confusion- inability to concentrate     If you or a loved one observes any of these behaviors or has concerns about self-harm, here's what you can do:  · Talk about it- your feelings and reasons for harming yourself  · Remove any means that you might  use to hurt yourself (examples: pills, rope, extension cords, firearm)  · Get professional help from the community (Mental Health, Substance Abuse, psychological counseling)  · Do not be alone:Call your Safe Contact- someone whom you trust who will be there for you.  · Call your local CRISIS HOTLINE 364-9637 or 536-277-2001  · Call your local Children's Mobile Crisis Response Team [place] (902) 907-7115 or wwwCareHubs  · Call the toll free National Suicide Prevention Hotlines   · National Suicide Prevention Lifeline 182-933-UFCO (9641)  · [employer] Hope Line Network 800-SUICIDE (651-9205)    Alcohol Abuse and Nutrition  Alcohol abuse is any pattern of alcohol consumption that harms your health, relationships, or work. Alcohol abuse can affect how your body breaks down and absorbs nutrients from food by causing your liver to work abnormally. Additionally, many people who abuse alcohol do not eat enough carbohydrates, protein, fat, vitamins, and minerals. This can cause poor nutrition (malnutrition) and a lack of nutrients (nutrient deficiencies), which can lead to further complications.  Nutrients that are commonly lacking (deficient) among people who abuse alcohol include:  · Vitamins.  ¨ Vitamin A. This is stored in your liver. It is important for your vision, metabolism, and ability to fight off infections (immunity).  ¨ B vitamins. These include vitamins such as folate, thiamin, and niacin. These are important in new cell growth and maintenance.  ¨ Vitamin C. This plays an important role in iron absorption, wound healing, and immunity.  ¨ Vitamin D. This is produced by your liver, but you can also get vitamin D from food. Vitamin D is necessary for your body to absorb and use calcium.  · Minerals.  ¨ Calcium. This is important for your bones and your heart and blood vessel (cardiovascular) function.  ¨ Iron. This is important for blood, muscle, and nervous system functioning.  ¨ Magnesium. This  plays an important role in muscle and nerve function, and it helps to control blood sugar and blood pressure.  ¨ Zinc. This is important for the normal function of your nervous system and digestive system (gastrointestinal tract).  Nutrition is an essential component of therapy for alcohol abuse. Your health care provider or dietitian will work with you to design a plan that can help restore nutrients to your body and prevent potential complications.  What is my plan?  Your dietitian may develop a specific diet plan that is based on your condition and any other complications you may have. A diet plan will commonly include:  · A balanced diet.  ¨ Grains: 6-8 oz per day.  ¨ Vegetables: 2-3 cups per day.  ¨ Fruits: 1-2 cups per day.  ¨ Meat and other protein: 5-6 oz per day.  ¨ Dairy: 2-3 cups per day.  · Vitamin and mineral supplements.  What do I need to know about alcohol and nutrition?  · Consume foods that are high in antioxidants, such as grapes, berries, nuts, green tea, and dark green and orange vegetables. This can help to counteract some of the stress that is placed on your liver by consuming alcohol.  · Avoid food and drinks that are high in fat and sugar. Foods such as sugared soft drinks, salty snack foods, and candy contain empty calories. This means that they lack important nutrients such as protein, fiber, and vitamins.  · Eat frequent meals and snacks. Try to eat 5-6 small meals each day.  · Eat a variety of fresh fruits and vegetables each day. This will help you get plenty of water, fiber, and vitamins in your diet.  · Drink plenty of water and other clear fluids. Try to drink at least 48-64 oz (1.5-2 L) of water per day.  · If you are a vegetarian, eat a variety of protein-rich foods. Pair whole grains with plant-based proteins at meals and snacks to obtain the greatest nutrient benefit from your food. For example, eat rice with beans, put peanut butter on whole-grain toast, or eat oatmeal with  sunflower seeds.  · Soak beans and whole grains overnight before cooking. This can help your body to absorb the nutrients more easily.  · Include foods fortified with vitamins and minerals in your diet. Commonly fortified foods include milk, orange juice, cereal, and bread.  · If you are malnourished, your dietitian may recommend a high-protein, high-calorie diet. This may include:  ¨ 2,000-3,000 calories (kilocalories) per day.  ¨  grams of protein per day.  · Your health care provider may recommend a complete nutritional supplement beverage. This can help to restore calories, protein, and vitamins to your body. Depending on your condition, you may be advised to consume this instead of or in addition to meals.  · Limit your intake of caffeine. Replace drinks like coffee and black tea with decaffeinated coffee and herbal tea.  · Eat a variety of foods that are high in omega fatty acids. These include fish, nuts and seeds, and soybeans. These foods may help your liver to recover and may also stabilize your mood.  · Certain medicines may cause changes in your appetite, taste, and weight. Work with your health care provider and dietitian to make any adjustments to your medicines and diet plan.  · Include other healthy lifestyle choices in your daily routine.  ¨ Be physically active.  ¨ Get enough sleep.  ¨ Spend time doing activities that you enjoy.  · If you are unable to take in enough food and calories by mouth, your health care provider may recommend a feeding tube. This is a tube that passes through your nose and throat, directly into your stomach. Nutritional supplement beverages can be given to you through the feeding tube to help you get the nutrients you need.  · Take vitamin or mineral supplements as recommended by your health care provider.  What foods can I eat?  Grains   Enriched pasta. Enriched rice. Fortified whole-grain bread. Fortified whole-grain cereal. Barley. Brown rice. Quinoa.  Millet.  Vegetables   All fresh, frozen, and canned vegetables. Spinach. Kale. Artichoke. Carrots. Winter squash and pumpkin. Sweet potatoes. Broccoli. Cabbage. Cucumbers. Tomatoes. Sweet peppers. Green beans. Peas. Corn.  Fruits   All fresh and frozen fruits. Berries. Grapes. [place]. Papaya. Guava. Cherries. Apples. Bananas. Peaches. Plums. Pineapple. Watermelon. Cantaloupe. Oranges. Avocado.  Meats and Other Protein Sources   Beef liver. Lean beef. Pork. Fresh and canned chicken. Fresh fish. Oysters. Sardines. Canned tuna. Shrimp. Eggs with yolks. Nuts and seeds. Peanut butter. Beans and lentils. Soybeans. Tofu.  Dairy   Whole, low-fat, and nonfat milk. Whole, low-fat, and nonfat yogurt. Cottage cheese. Sour cream. Hard and soft cheeses.  Beverages   Water. Herbal tea. Decaffeinated coffee. Decaffeinated green tea. 100% fruit juice. 100% vegetable juice. Instant breakfast shakes.  Condiments   Ketchup. Mayonnaise. Mustard. Salad dressing. Barbecue sauce.  Sweets and Desserts   Sugar-free ice cream. Sugar-free pudding. Sugar-free gelatin.  Fats and Oils   Butter. Vegetable oil, flaxseed oil, olive oil, and walnut oil.  Other   Complete nutrition shakes. Protein bars. Sugar-free gum.  The items listed above may not be a complete list of recommended foods or beverages. Contact your dietitian for more options.   What foods are not recommended?  Grains   Sugar-sweetened breakfast cereals. Flavored instant oatmeal. Fried breads.  Vegetables   Breaded or deep-fried vegetables.  Fruits   Dried fruit with added sugar. Candied fruit. Canned fruit in syrup.  Meats and Other Protein Sources   Breaded or deep-fried meats.  Dairy   Flavored milks. Fried cheese curds or fried cheese sticks.  Beverages   Alcohol. Sugar-sweetened soft drinks. Sugar-sweetened tea. Caffeinated coffee and tea.  Condiments   Sugar. Honey. Agave nectar. Molasses.  Sweets and Desserts   Chocolate. Cake. Cookies. Candy.  Other   Potato chips. Pretzels.  Salted nuts. Candied nuts.  The items listed above may not be a complete list of foods and beverages to avoid. Contact your dietitian for more information.   This information is not intended to replace advice given to you by your health care provider. Make sure you discuss any questions you have with your health care provider.  Document Released: 10/12/2006 Document Revised: 04/26/2017 Document Reviewed: 07/21/2015  [employer] Interactive Patient Education © 2017 [employer]      Alcohol Withdrawal  Alcohol withdrawal is a group of symptoms that can develop when a person who drinks heavily and regularly stops drinking or drinks less.  What are the causes?  Heavy and regular drinking can cause chemicals that send signals from the brain to the body (neurotransmitters) to deactivate. Alcohol withdrawal develops when deactivated neurotransmitters reactivate because a person stops drinking or drinks less.  What increases the risk?  The more a person drinks and the longer he or she drinks, the greater the risk of alcohol withdrawal. Severe withdrawal is more likely to develop in someone who:  · Had severe alcohol withdrawal in the past.  · Had a seizure during a previous episode of alcohol withdrawal.  · Is elderly.  · Is pregnant.  · Has been abusing drugs.  · Has other medical problems, including:  ¨ Infection.  ¨ Heart, lung, or liver disease.  ¨ Seizures.  ¨ Mental health problems.  What are the signs or symptoms?  Symptoms of this condition can be mild to moderate, or they can be severe.  Mild to moderate symptoms may include:  · Fatigue.  · Nightmares.  · Trouble sleeping.  · Depression.  · Anxiety.  · Inability to think clearly.  · Mood swings.  · Irritability.  · Loss of appetite.  · Nausea or vomiting.  · Clammy skin.  · Extreme sweating.  · Rapid heartbeat.  · Shakiness.  · Uncontrollable shaking (tremor).  Severe symptoms may include:  · Fever.  · Seizures.  · Severe confusion.  · Feeling or seeing things that  are not there (hallucinations).  Symptoms usually begin within eight hours after a person stops drinking or drinks less. They can last for weeks.  How is this diagnosed?  Alcohol withdrawal is diagnosed with a medical history and physical exam. Sometimes, urine and blood tests are also done.  How is this treated?  Treatment may involve:  · Monitoring blood pressure, pulse, and breathing.  · Getting fluids through an IV tube.  · Medicine to reduce anxiety.  · Medicine to prevent or control seizures.  · Multivitamins and B vitamins.  · Having a health care provider check on you daily.  If symptoms are moderate to severe or if there is a risk of severe withdrawal, treatment may be done at a hospital or treatment center.  Follow these instructions at home:  · Take medicines and vitamin supplements only as directed by your health care provider.  · Do not drink alcohol.  · Have someone stay with you or be available if you need help.  · Drink enough fluid to keep your urine clear or pale yellow.  · Consider joining a 12-step program or another alcohol support group.  Contact a health care provider if:  · Your symptoms get worse or do not go away.  · You cannot keep food or water in your stomach.  · You are struggling with not drinking alcohol.  · You cannot stop drinking alcohol.  Get help right away if:  · You have an irregular heartbeat.  · You have chest pain.  · You have trouble breathing.  · You have symptoms of severe withdrawal, such as:  ¨ A fever.  ¨ Seizures.  ¨ Severe confusion.  ¨ Hallucinations.  This information is not intended to replace advice given to you by your health care provider. Make sure you discuss any questions you have with your health care provider.  Document Released: 09/27/2006 Document Revised: 04/26/2017 Document Reviewed: 10/06/2015  [employer] Interactive Patient Education © 2017 [employer]

## 2019-10-06 NOTE — PROGRESS NOTES
Received report from Bandar. WAQAR up to 3.1 following oral supplementation. CIWA scores surrounding anxiety almost exclusively; Ativan 0.5mg ordered q8 PRN for anxiety. Pt awake in bed at this time; no complaints. POC discussed; all questions answered. Bed locked in lowest position; call light in reach.

## 2019-10-06 NOTE — CARE PLAN
Problem: Venous Thromboembolism (VTW)/Deep Vein Thrombosis (DVT) Prevention:  Goal: Patient will participate in Venous Thrombosis (VTE)/Deep Vein Thrombosis (DVT)Prevention Measures  Outcome: PROGRESSING AS EXPECTED     Problem: Bowel/Gastric:  Goal: Will not experience complications related to bowel motility  Outcome: PROGRESSING AS EXPECTED     Problem: Discharge Barriers/Planning  Goal: Patient's continuum of care needs will be met  Outcome: PROGRESSING AS EXPECTED

## 2019-10-06 NOTE — PROGRESS NOTES
Bedside report received from NOC JUVE Hidalgo and Mic, pt is resting in bed with no reports of pain, BP is elevated 148/106 while at rest; not within PRN parameters at this time, will continue to monitor. Bed is locked in lowest position with call light, belongings within reach, white board updated, and POC discussed. All needs met at this time.

## 2019-10-07 NOTE — DISCHARGE SUMMARY
Discharge Summary    CHIEF COMPLAINT ON ADMISSION  Chief Complaint   Patient presents with   • Detox   • Suicidal Ideation       Reason for Admission  Detox     Admission Date  10/3/2019    CODE STATUS  Full code    HPI & HOSPITAL COURSE  Please see original H&P for specific information, patient admitted due to alcohol withdrawal and mild acute hepatitis due to alcohol abuse, patient had ultrasound liver does show common bile duct dilatation and bilirubin was 1.9, patient went for MRCP that was negative for obstruction, patient and her liver function test most likely due to alcohol abuse, MRCP showed incidental finding of Enlarged multicystic kidneys, consistent with autosomal dominant polycystic kidney disease patient will need to follow-up with primary care physician as outpatient and keep close monitoring of her kidney function, patient has sinus tachycardia but he denies any symptoms no lightheadedness no palpitations, patient EKG did not show any abnormal rhythm, patient's electrolytes have been corrected potassium 3.7 and magnesium is 1.8 he is on p.o. magnesium supplement, patient is feeling much better today he is alert oriented follows commands denies any focal weakness he is tolerating diet, patient has plans to go to rehab he has expressed understanding of the need to quit drinking, patient is going to be discharged home today in stable condition, patient expressed understanding of his discharge plan and agree with it all questions answered.       Therefore, he is discharged in good and stable condition to home with close outpatient follow-up.    The patient met 2-midnight criteria for an inpatient stay at the time of discharge.    Discharge Date  10/6/2019    FOLLOW UP ITEMS POST DISCHARGE  Primary care physician in 1 week    DISCHARGE DIAGNOSES  Principal Problem:    Alcohol dependence with withdrawal delirium (HCC) POA: Unknown  Active Problems:    Hypokalemia POA: Unknown    Hypomagnesemia POA:  Unknown    Hypophosphatemia POA: Unknown    Non-intractable vomiting with nausea POA: Unknown    Elevated LFTs POA: Unknown    Thrombocytopenia (HCC) POA: Yes  Resolved Problems:    * No resolved hospital problems. *      FOLLOW UP  No future appointments.  Wil Barboza M.D.  [address]  [facility] [address]  [phone]    In 2 weeks  Follow up      MEDICATIONS ON DISCHARGE     Medication List      START taking these medications      Instructions   folic acid 1 MG Tabs  Start taking on:  10/7/2019  Commonly known as:  FOLVITE   Take 1 Tab by mouth every day.  Dose:  1 mg     magnesium oxide 400 (241.3 Mg) MG Tabs tablet  Start taking on:  10/7/2019  Commonly known as:  MAG-OX   Take 1 Tab by mouth every day for 14 days.  Dose:  400 mg     multivitamin Tabs  Start taking on:  10/7/2019   Take 1 Tab by mouth every day.  Dose:  1 Tab     thiamine 100 MG tablet  Start taking on:  10/7/2019  Commonly known as:  THIAMINE   Take 1 Tab by mouth every day.  Dose:  100 mg            Allergies  No Known Allergies    DIET  Healthy diet    ACTIVITY  As tolerated.  Weight bearing as tolerated    CONSULTATIONS  None    PROCEDURES  None    LABORATORY  Lab Results   Component Value Date    SODIUM 134 (L) 10/05/2019    POTASSIUM 3.7 10/06/2019    CHLORIDE 101 10/05/2019    CO2 23 10/05/2019    GLUCOSE 112 (H) 10/05/2019    BUN 10 10/05/2019    CREATININE 0.83 10/05/2019        Lab Results   Component Value Date    WBC 6.9 10/06/2019    HEMOGLOBIN 15.3 10/06/2019    HEMATOCRIT 42.1 10/06/2019    PLATELETCT 110 (L) 10/06/2019        Total time of the discharge process exceeds 37 minutes.

## 2020-02-17 ENCOUNTER — APPOINTMENT (OUTPATIENT)
Dept: RADIOLOGY | Facility: MEDICAL CENTER | Age: 35
End: 2020-02-17
Attending: EMERGENCY MEDICINE

## 2020-02-17 ENCOUNTER — HOSPITAL ENCOUNTER (EMERGENCY)
Facility: MEDICAL CENTER | Age: 35
End: 2020-02-17
Attending: EMERGENCY MEDICINE

## 2020-02-17 VITALS
OXYGEN SATURATION: 94 % | BODY MASS INDEX: 25.08 KG/M2 | TEMPERATURE: 97.3 F | DIASTOLIC BLOOD PRESSURE: 101 MMHG | SYSTOLIC BLOOD PRESSURE: 135 MMHG | HEIGHT: 72 IN | HEART RATE: 108 BPM | RESPIRATION RATE: 20 BRPM | WEIGHT: 185.19 LBS

## 2020-02-17 DIAGNOSIS — E86.0 DEHYDRATION: ICD-10-CM

## 2020-02-17 DIAGNOSIS — F10.932 ALCOHOL WITHDRAWAL SYNDROME WITH PERCEPTUAL DISTURBANCE (HCC): ICD-10-CM

## 2020-02-17 DIAGNOSIS — F10.10 ALCOHOL ABUSE: ICD-10-CM

## 2020-02-17 DIAGNOSIS — R10.9 ABDOMINAL DISCOMFORT: ICD-10-CM

## 2020-02-17 DIAGNOSIS — E87.6 HYPOKALEMIA: ICD-10-CM

## 2020-02-17 LAB
ALBUMIN SERPL BCP-MCNC: 4 G/DL (ref 3.2–4.9)
ALBUMIN/GLOB SERPL: 1.3 G/DL
ALP SERPL-CCNC: 65 U/L (ref 30–99)
ALT SERPL-CCNC: 162 U/L (ref 2–50)
ANION GAP SERPL CALC-SCNC: 14 MMOL/L (ref 0–11.9)
APTT PPP: 25.4 SEC (ref 24.7–36)
AST SERPL-CCNC: 268 U/L (ref 12–45)
BASOPHILS # BLD AUTO: 0.4 % (ref 0–1.8)
BASOPHILS # BLD: 0.03 K/UL (ref 0–0.12)
BILIRUB SERPL-MCNC: 1.6 MG/DL (ref 0.1–1.5)
BUN SERPL-MCNC: 16 MG/DL (ref 8–22)
CALCIUM SERPL-MCNC: 9.1 MG/DL (ref 8.5–10.5)
CHLORIDE SERPL-SCNC: 94 MMOL/L (ref 96–112)
CO2 SERPL-SCNC: 21 MMOL/L (ref 20–33)
CREAT SERPL-MCNC: 0.84 MG/DL (ref 0.5–1.4)
EOSINOPHIL # BLD AUTO: 0.01 K/UL (ref 0–0.51)
EOSINOPHIL NFR BLD: 0.1 % (ref 0–6.9)
ERYTHROCYTE [DISTWIDTH] IN BLOOD BY AUTOMATED COUNT: 36.3 FL (ref 35.9–50)
ETHANOL BLD-MCNC: 0 G/DL
GLOBULIN SER CALC-MCNC: 3.2 G/DL (ref 1.9–3.5)
GLUCOSE SERPL-MCNC: 114 MG/DL (ref 65–99)
HCT VFR BLD AUTO: 45 % (ref 42–52)
HGB BLD-MCNC: 16.6 G/DL (ref 14–18)
IMM GRANULOCYTES # BLD AUTO: 0.03 K/UL (ref 0–0.11)
IMM GRANULOCYTES NFR BLD AUTO: 0.4 % (ref 0–0.9)
INR PPP: 1.23 (ref 0.87–1.13)
LACTATE BLD-SCNC: 1.9 MMOL/L (ref 0.5–2)
LIPASE SERPL-CCNC: 65 U/L (ref 11–82)
LYMPHOCYTES # BLD AUTO: 1.09 K/UL (ref 1–4.8)
LYMPHOCYTES NFR BLD: 14.1 % (ref 22–41)
MCH RBC QN AUTO: 31.1 PG (ref 27–33)
MCHC RBC AUTO-ENTMCNC: 36.9 G/DL (ref 33.7–35.3)
MCV RBC AUTO: 84.3 FL (ref 81.4–97.8)
MONOCYTES # BLD AUTO: 0.65 K/UL (ref 0–0.85)
MONOCYTES NFR BLD AUTO: 8.4 % (ref 0–13.4)
NEUTROPHILS # BLD AUTO: 5.92 K/UL (ref 1.82–7.42)
NEUTROPHILS NFR BLD: 76.6 % (ref 44–72)
NRBC # BLD AUTO: 0 K/UL
NRBC BLD-RTO: 0 /100 WBC
PLATELET # BLD AUTO: 145 K/UL (ref 164–446)
PMV BLD AUTO: 11.3 FL (ref 9–12.9)
POTASSIUM SERPL-SCNC: 3.3 MMOL/L (ref 3.6–5.5)
PROT SERPL-MCNC: 7.2 G/DL (ref 6–8.2)
PROTHROMBIN TIME: 15.8 SEC (ref 12–14.6)
RBC # BLD AUTO: 5.34 M/UL (ref 4.7–6.1)
SODIUM SERPL-SCNC: 129 MMOL/L (ref 135–145)
WBC # BLD AUTO: 7.7 K/UL (ref 4.8–10.8)

## 2020-02-17 PROCEDURE — 700102 HCHG RX REV CODE 250 W/ 637 OVERRIDE(OP): Performed by: EMERGENCY MEDICINE

## 2020-02-17 PROCEDURE — 83605 ASSAY OF LACTIC ACID: CPT

## 2020-02-17 PROCEDURE — 85730 THROMBOPLASTIN TIME PARTIAL: CPT

## 2020-02-17 PROCEDURE — 76705 ECHO EXAM OF ABDOMEN: CPT

## 2020-02-17 PROCEDURE — 85610 PROTHROMBIN TIME: CPT

## 2020-02-17 PROCEDURE — A9270 NON-COVERED ITEM OR SERVICE: HCPCS | Performed by: EMERGENCY MEDICINE

## 2020-02-17 PROCEDURE — 80307 DRUG TEST PRSMV CHEM ANLYZR: CPT

## 2020-02-17 PROCEDURE — 85025 COMPLETE CBC W/AUTO DIFF WBC: CPT

## 2020-02-17 PROCEDURE — 99285 EMERGENCY DEPT VISIT HI MDM: CPT

## 2020-02-17 PROCEDURE — 80053 COMPREHEN METABOLIC PANEL: CPT

## 2020-02-17 PROCEDURE — 700105 HCHG RX REV CODE 258: Performed by: EMERGENCY MEDICINE

## 2020-02-17 PROCEDURE — 83690 ASSAY OF LIPASE: CPT

## 2020-02-17 RX ORDER — DIAZEPAM 5 MG/1
10 TABLET ORAL ONCE
Status: COMPLETED | OUTPATIENT
Start: 2020-02-17 | End: 2020-02-17

## 2020-02-17 RX ORDER — FOLIC ACID 1 MG/1
1 TABLET ORAL ONCE
Status: COMPLETED | OUTPATIENT
Start: 2020-02-17 | End: 2020-02-17

## 2020-02-17 RX ORDER — POTASSIUM CHLORIDE 20 MEQ/1
40 TABLET, EXTENDED RELEASE ORAL ONCE
Status: COMPLETED | OUTPATIENT
Start: 2020-02-17 | End: 2020-02-17

## 2020-02-17 RX ORDER — ONDANSETRON 2 MG/ML
4 INJECTION INTRAMUSCULAR; INTRAVENOUS ONCE
Status: DISCONTINUED | OUTPATIENT
Start: 2020-02-17 | End: 2020-02-17 | Stop reason: HOSPADM

## 2020-02-17 RX ORDER — SODIUM CHLORIDE 9 MG/ML
1000 INJECTION, SOLUTION INTRAVENOUS ONCE
Status: COMPLETED | OUTPATIENT
Start: 2020-02-17 | End: 2020-02-17

## 2020-02-17 RX ORDER — THIAMINE MONONITRATE (VIT B1) 100 MG
100 TABLET ORAL ONCE
Status: COMPLETED | OUTPATIENT
Start: 2020-02-17 | End: 2020-02-17

## 2020-02-17 RX ADMIN — DIAZEPAM 10 MG: 5 TABLET ORAL at 03:01

## 2020-02-17 RX ADMIN — DIAZEPAM 10 MG: 5 TABLET ORAL at 04:44

## 2020-02-17 RX ADMIN — SODIUM CHLORIDE 1000 ML: 9 INJECTION, SOLUTION INTRAVENOUS at 03:01

## 2020-02-17 RX ADMIN — POTASSIUM CHLORIDE 40 MEQ: 1500 TABLET, EXTENDED RELEASE ORAL at 04:44

## 2020-02-17 RX ADMIN — FOLIC ACID 1 MG: 1 TABLET ORAL at 03:01

## 2020-02-17 RX ADMIN — Medication 100 MG: at 03:01

## 2020-02-17 NOTE — ED PROVIDER NOTES
ED Provider Note    Scribed for Bret Coats M.D. by Nancy Benitez. 2/17/2020  2:40 AM    CHIEF COMPLAINT  Chief Complaint   Patient presents with   • Detox     Pt is requesting medical detox from alcohol addiction.  Pt last drank 36 hours ago, and has the shakes.  Pt is alert and oriented, has history of alcohol abuse.         LUIS A Fernandez is a 34 y.o. male who presents with complaint of alcohol withdrawal with onset today. He states that he had previously ceased drinking for four months. Then for the last two and a half weeks was drinking heavily again, and he has tried to detox on his own at home. The patient reports feeling body aches, LUQ pain, shakes, right facial numbness, and visual hallucinations when he closes his eyes. Last drink reported to be 2 days ago. Patient states that he drank a small amount of beer with relief of his symptoms that lasted for a few hours. He denies any other drug use. Denies any other medical problems or surgical history. The patient takes nexium for acid reflux and an anxiety medication. Does report history of detoxing here previously. Denies nausea, vomiting, chest pain, shortness of breath, or suicidal ideation.    REVIEW OF SYSTEMS  Constitutional: No fevers, chills, or recent illness.  Skin: No rashes or diaphoresis.  Eyes: No change in vision, no discharge.  ENT: No hearing change. No rhinorrhea or nasal congestion, no ST or difficulty swallowing.  Respiratory: No SOB. No coughing or hemoptysis. No Wheezing.  Cardiac: No CP, palpitations, edema. No PND or orthopnea.  GI:Positive for Abdominal Pain; N/V; diarrhea, constipation. No blood in stool.  : No dysuria. No D/C. No frequency or urgency. No hesitancy.   MSK: No pain in joints or muscles. No calf pain or swelling.  Neuro: No HA or paresthesias. No focal weakness.  Psych: No SI, HI, AH, VH.  Endocrine: No polyuria or polydipsia.  No heat or cold intolerance.  Heme: No easy bruising. No history of bleeding disorders or anemia.    PAST MEDICAL HISTORY   None noted    SOCIAL HISTORY  Social History     Tobacco Use   • Smoking status: Never Smoker   • Smokeless tobacco: Never Used   Substance and Sexual Activity   • Alcohol use: Yes     Comment: socially every 3 weeks (5-6 beers or cocktails); used to drink 2-3 beers daily   • Drug use: No   • Sexual activity: Yes     Partners: Female       SURGICAL HISTORY  patient denies any surgical history    CURRENT MEDICATIONS  Home Medications     Reviewed by Kervin Sanchez R.N. (Registered Nurse) on 02/17/20 at 0154  Med List Status: <None>   Medication Last Dose Status   folic acid (FOLVITE) 1 MG Tab  Active   multivitamin (THERAGRAN) Tab  Active   thiamine (THIAMINE) 100 MG tablet  Active                ALLERGIES  None noted    PHYSICAL EXAM  VITAL SIGNS: /94   Pulse (!) 103   Temp 36.3 °C (97.3 °F) (Oral)   Resp 20   Ht 1.829 m (6')   Wt 84 kg (185 lb 3 oz)   SpO2 95%   BMI 25.12 kg/m²    Pulse ox interpretation: I interpret this pulse ox as normal.  Genl: Anxious appearing non-toxic M sitting in chair comfortably, speaking clearly, appears in no acute distress   Head: NC/AT   ENT: Mucous membranes dry, posterior pharynx clear, uvula midline, nares patent bilaterally   Eyes: Normal sclera, pupils equal round reactive to light  Neck: Supple, FROM, no LAD appreciated   Pulmonary: Lungs are clear to auscultation bilaterally  Chest: No TTP  CV:  RRR, no murmur appreciated, pulses 2+ in both upper and lower extremities,  Abdomen: soft, ND; no rebound/guarding, no masses palpated, no HSM epigastric and RUQ discomfort with no barr's sign.  : no CVA or suprapubic tenderness   Musculoskeletal: Pain free ROM of the neck. Moving upper and lower extremities and spontaneous in coordinated fashion  Neuro: A&Ox4 (person, place, time, situation), speech fluent, gait steady, no focal deficits  appreciated, No cerebellar signs Sensation is grossly intact in the distal upper and lower extremities. 5/5 strength in  and dorsiflexion/plantar flexion of the ankles.  No asterixis is present  Psych: Patient has an appropriate affect and behavior  Skin: No rash or lesions.  No pallor or jaundice.  No cyanosis.  Warm and dry. Scant piloerection and perceived pinprick sensation.     DIAGNOSTIC STUDIES / PROCEDURES    LABS  Labs Reviewed   CBC WITH DIFFERENTIAL - Abnormal; Notable for the following components:       Result Value    MCHC 36.9 (*)     Platelet Count 145 (*)     Neutrophils-Polys 76.60 (*)     Lymphocytes 14.10 (*)     All other components within normal limits   COMP METABOLIC PANEL - Abnormal; Notable for the following components:    Sodium 129 (*)     Potassium 3.3 (*)     Chloride 94 (*)     Anion Gap 14.0 (*)     Glucose 114 (*)     AST(SGOT) 268 (*)     ALT(SGPT) 162 (*)     Total Bilirubin 1.6 (*)     All other components within normal limits   PROTHROMBIN TIME - Abnormal; Notable for the following components:    PT 15.8 (*)     INR 1.23 (*)     All other components within normal limits    Narrative:     Liver disease  Indicate which anticoagulants the patient is on:->NONE   LIPASE   LACTIC ACID   DIAGNOSTIC ALCOHOL   ESTIMATED GFR   APTT    Narrative:     Liver disease  Indicate which anticoagulants the patient is on:->NONE       RADIOLOGY  US-RUQ   Final Result         1.  Hepatomegaly and echogenic liver, compatible with fatty change versus fibrosis.   2.  Gallbladder sludge without additional sonographic findings to suggests cholecystitis.   3.  Echogenic nonshadowing focus within the right kidney, could represent poorly calcified stone or angiomyolipoma.   4.  Numerous large right renal cysts without visualized complex features          COURSE & MEDICAL DECISION MAKING  Pertinent Labs & Imaging studies reviewed. (See chart for details)    Differential diagnoses include but not limited  to: alcohol intoxication, alcohol withdrawal, polysubstance abuse, alcohol induced gastritis, pancreatitis, cholecystitis.     2:40 AM - Patient seen and examined at bedside. Patient will be treated with IV fluids, folic acid, thiamine 100mg po, and valium 10mg. Ordered PT/INR, PTT, US RUQ, estimated GFR, CBC w/ diff, CMP, lipase, lactic acid, and diagnostic alcohol to evaluate his symptoms.     HYDRATION: Based on the patient's presentation of Inability to take oral fluids and Tachycardia the patient was given IV fluids. IV Hydration was used because oral hydration was not adequate alone. Upon recheck following hydration, the patient was improved.    Medical Decision Making:   Presents emergency room for symptoms as described above.  The patient has recently been on a 2-week drinking binge, last drink approximately a day and 1/2 to 2 days ago and is beginning to have tactile stimulations, irritation, anxiousness and nausea in addition to abdominal discomfort.  Initial symptoms are suggestive of alcoholic gastritis versus pancreatitis or possible gallbladder disease in addition to alcohol withdrawal.  IV access was established, initial oral Valium administered with good effect and lab work is obtained for the broad differential above.    Lab work remarkable for slight hyponatremia and hypokalemia with normal sugars and scant elevation in anion gap.  AST and ALT are elevated 2-1 ratio, bilirubin slightly increased at 1.6.  While this is acutely elevated at this point this is not in comparison to his previous evaluation.  INR is on the higher end of normal, no other acute coagulopathies.  Right upper quadrant ultrasound is performed to evaluate for possible concurrent obstructive pathology as this is not been done recently.  MRI from 10/4/2019 is reviewed and showed hepatic cysts, evidence of polycystic kidney disease.  Patient's creatinine is normal at this point.  He is extensively counseled about the available  resources, the need to stop drinking as this will likely lead to further dysfunction, organ failure and possible death.    The patient feels much improved on reevaluation, is updated regarding labs and imaging.  There is discussion regarding oral repletion of his potassium and other electrolytes, the need to stop drinking and resources for acute detox.      FINAL IMPRESSION  Visit Diagnoses     ICD-10-CM   1. Alcohol abuse F10.10   2. Alcohol withdrawal syndrome with perceptual disturbance (HCC) F10.232   3. Hypokalemia E87.6   4. Dehydration E86.0   5. Abdominal discomfort R10.9     INancy (Scribe), am scribing for, and in the presence of, Bret Coats M.D..    Electronically signed by: Nancy Benitez (Scribe), 2/17/2020    Bret BOYER M.D. personally performed the services described in this documentation, as scribed by Nancy Benitez in my presence, and it is both accurate and complete.    The note accurately reflects work and decisions made by me.  Bret Coats M.D.  2/17/2020  5:32 AM    C

## 2020-02-17 NOTE — ED TRIAGE NOTES
Maxime Fernandez  34 y.o. male  Chief Complaint   Patient presents with   • Detox     Pt is requesting medical detox from alcohol addiction.  Pt last drank 36 hours ago, and has the shakes.  Pt is alert and oriented, has history of alcohol abuse.     BP (!) 165/108   Pulse (!) 126   Temp 36.3 °C (97.3 °F) (Oral)   Resp 20   Ht 1.829 m (6')   Wt 84 kg (185 lb 3 oz)   SpO2 95%   BMI 25.12 kg/m²

## 2020-02-17 NOTE — DISCHARGE PLANNING
Medical Social Work    MSW received a call from bedside RN requesting detox resources for pt.  MSW provided bedside RN with a list of local substance abuse programs.  [facility] as pt is uninsured.

## 2020-02-17 NOTE — ED NOTES
Patient ambulatory to restroom to obtain a Urine Specimen.  Pt was given instruction on proper collection- Pt verbalized understanding.   Pt to return specimen cup to room when finished.

## 2020-04-19 ENCOUNTER — HOSPITAL ENCOUNTER (EMERGENCY)
Facility: MEDICAL CENTER | Age: 35
End: 2020-04-19
Attending: EMERGENCY MEDICINE
Payer: COMMERCIAL

## 2020-04-19 VITALS
HEART RATE: 100 BPM | WEIGHT: 180.78 LBS | DIASTOLIC BLOOD PRESSURE: 66 MMHG | HEIGHT: 72 IN | RESPIRATION RATE: 18 BRPM | BODY MASS INDEX: 24.49 KG/M2 | TEMPERATURE: 98 F | OXYGEN SATURATION: 98 % | SYSTOLIC BLOOD PRESSURE: 134 MMHG

## 2020-04-19 DIAGNOSIS — F10.930 ALCOHOL WITHDRAWAL SYNDROME WITHOUT COMPLICATION (HCC): ICD-10-CM

## 2020-04-19 LAB — POC BREATHALIZER: 0 PERCENT (ref 0–0.01)

## 2020-04-19 PROCEDURE — 700102 HCHG RX REV CODE 250 W/ 637 OVERRIDE(OP): Performed by: EMERGENCY MEDICINE

## 2020-04-19 PROCEDURE — A9270 NON-COVERED ITEM OR SERVICE: HCPCS | Performed by: EMERGENCY MEDICINE

## 2020-04-19 PROCEDURE — 302970 POC BREATHALIZER: Performed by: EMERGENCY MEDICINE

## 2020-04-19 PROCEDURE — 99284 EMERGENCY DEPT VISIT MOD MDM: CPT

## 2020-04-19 PROCEDURE — A9270 NON-COVERED ITEM OR SERVICE: HCPCS

## 2020-04-19 PROCEDURE — 700102 HCHG RX REV CODE 250 W/ 637 OVERRIDE(OP)

## 2020-04-19 RX ORDER — PHENOBARBITAL 30 MG/1
TABLET ORAL
Status: COMPLETED
Start: 2020-04-19 | End: 2020-04-19

## 2020-04-19 RX ORDER — PHENOBARBITAL 97.2 MG/1
97.2 TABLET ORAL ONCE
Status: COMPLETED | OUTPATIENT
Start: 2020-04-19 | End: 2020-04-19

## 2020-04-19 RX ORDER — THIAMINE MONONITRATE (VIT B1) 100 MG
100 TABLET ORAL ONCE
Status: COMPLETED | OUTPATIENT
Start: 2020-04-19 | End: 2020-04-19

## 2020-04-19 RX ADMIN — PHENOBARBITAL 97.2 MG: 97.2 TABLET ORAL at 02:40

## 2020-04-19 RX ADMIN — Medication 100 MG: at 03:24

## 2020-04-19 RX ADMIN — PHENOBARBITAL 97.2 MG: 30 TABLET ORAL at 02:40

## 2020-04-19 ASSESSMENT — LIFESTYLE VARIABLES
HAVE YOU EVER FELT YOU SHOULD CUT DOWN ON YOUR DRINKING: YES
TOTAL SCORE: 4
TOTAL SCORE: 4
HAVE PEOPLE ANNOYED YOU BY CRITICIZING YOUR DRINKING: YES
ON A TYPICAL DAY WHEN YOU DRINK ALCOHOL HOW MANY DRINKS DO YOU HAVE: 6
CONSUMPTION TOTAL: INCOMPLETE
TOTAL SCORE: 4
EVER HAD A DRINK FIRST THING IN THE MORNING TO STEADY YOUR NERVES TO GET RID OF A HANGOVER: YES
EVER FELT BAD OR GUILTY ABOUT YOUR DRINKING: YES
DO YOU DRINK ALCOHOL: YES
AVERAGE NUMBER OF DAYS PER WEEK YOU HAVE A DRINK CONTAINING ALCOHOL: 7

## 2020-04-19 ASSESSMENT — FIBROSIS 4 INDEX: FIB4 SCORE: 5.08

## 2020-04-19 NOTE — DISCHARGE INSTRUCTIONS
You were seen in the emergency department for alcohol withdrawal.  Your physical exam was reassuring.  You were treated with a medication to help control your symptoms that we will slowly taper off over the next few days.  This may not completely treat all of your withdrawal symptoms.  We recommend inpatient detox for alcohol withdrawal as alcohol withdrawal can be life-threatening if uncontrolled.    Please drink plenty of fluids.    Please return to the emergency department or seek medical attention if you develop:  Severe agitation, uncontrollable shakes, seizure, vomiting, abdominal pain, difficulty breathing, any other new or concerning findings    ================================  Coronavirus Information    Your visit did NOT relate to coronavirus, but if you or your family develop symptoms that concern you for coronavirus (please see CDC website for symptoms), please contact the [facility] hotline (or your local health department)  or your healthcare provider before going to a medical facility:    [facility]  Daytime hours: [phone]  Anytime: 311    Information is available from the Centers for Disease Control and Prevention  www.CDC.gov    and     [facility]  [email or web address]    If you are severely ill or having a hard time breathing, please immediatly seek medical care. Notify the  or Emergency Department Triage about your symptoms.

## 2020-04-19 NOTE — ED PROVIDER NOTES
ED Provider Note      History obtained from: Patient  Means of arrival: EMS    CHIEF COMPLAINT  Chief Complaint   Patient presents with   • ETOH Withdrawal       HPI  Maxime Fernandez is a 34 y.o. male who presents with shakes, alcohol withdrawal.  Patient reports that he has been drinking heavily, at least 6 drinks a day for the past month.  He reports that yesterday he began wanting to detox.  He reports his last drink was little over 6 hours prior to arrival.  This evening he was attempting to go to bed and had the sensation of falling, slight disequilibrium, and shakiness, and his sponsor advised he come to the emergency department.  Patient has never had a withdrawal seizure or be admitted to the ICU for alcohol withdrawal.  He reports mild tremor.  He denies any fevers, headache, vomiting, abdominal pain    REVIEW OF SYSTEMS    CONSTITUTIONAL:  No fever.  CARDIOVASCULAR:  No chest discomfort.  RESPIRATORY:  No pleuritic chest pain.  GASTROINTESTINAL:  No abdominal pain.    See HPI for further details.   All other systems reviewed and negative    PAST MEDICAL HISTORY  History reviewed. No pertinent past medical history.    FAMILY HISTORY  Family History   Problem Relation Age of Onset   • Thyroid Mother    • Hyperlipidemia Father    • No Known Problems Sister    • No Known Problems Brother        SOCIAL HISTORY   reports that he has never smoked. He has never used smokeless tobacco. He reports current alcohol use. He reports that he does not use drugs.    SURGICAL HISTORY  History reviewed. No pertinent surgical history.    CURRENT MEDICATIONS  Home Medications     Reviewed by Mark De Oliveira R.N. (Registered Nurse) on 04/19/20 at 0148  Med List Status: Complete   Medication Last Dose Status   folic acid (FOLVITE) 1 MG Tab  Active   multivitamin (THERAGRAN) Tab  Active   thiamine (THIAMINE) 100 MG tablet  Active                ALLERGIES  No Known Allergies    PHYSICAL EXAM  VITAL SIGNS: /66   Pulse  100   Temp 36.7 °C (98 °F) (Tympanic)   Resp 18   Ht 1.829 m (6')   Wt 82 kg (180 lb 12.4 oz)   SpO2 98%   BMI 24.52 kg/m²    Gen: alert, no acute distress  HENT: ATNC, normal oropharynx, minimal tongue fasciculations  Eyes: normal conjuctiva  Resp: No resipiratory distress.,  Clear auscultation  CV: No JVD, regular rate and rhythm, tachycardic, no murmur  Abd: Non-distended, non-to  Extremities: No deformity, moves all extremities.  Minimal tremor  Neuro: Moves all extremities, no focal neurologic deficits, GCS 15        LABS  Labs Reviewed   POC BREATHALIZER - Normal        COURSE & MEDICAL DECISION MAKING  Pertinent Labs & Imaging studies reviewed. (See chart for details)    Patient presents for alcohol withdrawal.  No evidence of other toxidrome.  He does have mild tachycardia and mild tremor, will treat with phenobarbital for gradual taper.  He has attempted to get into [facility], however they report they have no beds.  He has no high risk features to suggest delirium tremens.  He was seen in February for similar complaints.  Patient has no stigmata of liver failure, no abdominal pain to suggest acute hepatitis.  He is not vomiting.  He tolerated the medication well with subjective improvement in his symptoms.  Patient will be orally hydrated to ensure his tachycardia improves.  Patient was given information on inpatient detox, return precautions, anticipatory guidance, and the opportunity ask questions prior to discharge.  He was picked up by a sponsor for transfer directly to [facility] for inpatient detox.  The patient has called ahead and ensure they have space for him.    FINAL IMPRESSION  1. Alcohol withdrawal syndrome without complication (HCC)         DISPOSITION:  Patient will be discharged home in stable condition.    FOLLOW UP:  [facility]  [address] [place] [id number] [phone]        [facility]  [address]  [address]  [phone]        [facility]   [address] · First Floor   [place] [address]   [phone]          OUTPATIENT MEDICATIONS:  Discharge Medication List as of 4/19/2020  3:49 AM

## 2020-05-03 ENCOUNTER — OFFICE VISIT (OUTPATIENT)
Dept: URGENT CARE | Facility: CLINIC | Age: 35
End: 2020-05-03
Payer: COMMERCIAL

## 2020-05-03 VITALS
DIASTOLIC BLOOD PRESSURE: 80 MMHG | HEIGHT: 71 IN | TEMPERATURE: 97.7 F | SYSTOLIC BLOOD PRESSURE: 112 MMHG | RESPIRATION RATE: 14 BRPM | BODY MASS INDEX: 25.34 KG/M2 | HEART RATE: 114 BPM | OXYGEN SATURATION: 94 % | WEIGHT: 181 LBS

## 2020-05-03 DIAGNOSIS — J02.9 PHARYNGITIS, UNSPECIFIED ETIOLOGY: ICD-10-CM

## 2020-05-03 DIAGNOSIS — Z91.09 HISTORY OF ENVIRONMENTAL ALLERGIES: ICD-10-CM

## 2020-05-03 LAB
INT CON NEG: NEGATIVE
INT CON POS: POSITIVE
S PYO AG THROAT QL: NEGATIVE

## 2020-05-03 PROCEDURE — 99203 OFFICE O/P NEW LOW 30 MIN: CPT | Performed by: NURSE PRACTITIONER

## 2020-05-03 PROCEDURE — 87880 STREP A ASSAY W/OPTIC: CPT | Performed by: NURSE PRACTITIONER

## 2020-05-03 RX ORDER — ESCITALOPRAM OXALATE 10 MG/1
TABLET ORAL
COMMUNITY
Start: 2020-04-23 | End: 2021-05-12

## 2020-05-03 RX ORDER — HYDROXYZINE PAMOATE 50 MG/1
50 CAPSULE ORAL EVERY 8 HOURS PRN
COMMUNITY
Start: 2020-04-23 | End: 2022-02-21

## 2020-05-03 RX ORDER — ZOLPIDEM TARTRATE 10 MG/1
TABLET ORAL
COMMUNITY
Start: 2020-04-23 | End: 2021-05-13

## 2020-05-03 ASSESSMENT — ENCOUNTER SYMPTOMS
CHILLS: 1
FEVER: 0
HEADACHES: 1
RESPIRATORY NEGATIVE: 1
SORE THROAT: 1
COUGH: 0
SHORTNESS OF BREATH: 0

## 2020-05-03 ASSESSMENT — FIBROSIS 4 INDEX: FIB4 SCORE: 5.08

## 2020-05-03 NOTE — PROGRESS NOTES
Subjective:     Maxime Fernandez is a 34 y.o. male who presents for Sore Throat (x 1 week.  Sore throat off and on, post nasal drip, headache.  Denies fever. )       Pharyngitis    This is a new problem. Episode onset: 1 week ago. The problem has been gradually worsening. There has been no fever. The patient is experiencing no pain. Associated symptoms include headaches. Pertinent negatives include no coughing or shortness of breath. He has tried nothing for the symptoms.     History of environmental allergies. Takes hydroxyzine as needed for anxiety which he states also helps with his allergies, but has not taken it recently.    Patient was screened prior to rooming. No international travel or domestic travel to a high-risk area or contact with a COVID-19 confirmed patient in the past 14 days.    PMH:  has no past medical history on file.    MEDS:   Current Outpatient Medications:   •  thiamine (THIAMINE) 100 MG tablet, Take 1 Tab by mouth every day., Disp: 30 Tab, Rfl: 0  •  multivitamin (THERAGRAN) Tab, Take 1 Tab by mouth every day., Disp: 30 Tab, Rfl: 0  •  folic acid (FOLVITE) 1 MG Tab, Take 1 Tab by mouth every day., Disp: 30 Tab, Rfl: 0    ALLERGIES: No Known Allergies    SURGHX: History reviewed. No pertinent surgical history.    SOCHX:  reports that he has never smoked. He has never used smokeless tobacco. He reports current alcohol use. He reports that he does not use drugs.     FH: Reviewed with patient, not pertinent to this visit.    Review of Systems   Constitutional: Positive for chills. Negative for fever and malaise/fatigue.   HENT: Positive for sore throat.         Postnasal drip   Respiratory: Negative.  Negative for cough and shortness of breath.    Neurological: Positive for headaches.   Endo/Heme/Allergies: Positive for environmental allergies.   All other systems reviewed and are negative.    Additional details per HPI.    Objective:     /80   Pulse (!) 114   Temp 36.5 °C (97.7  "°F) (Temporal)   Resp 14   Ht 1.803 m (5' 11\")   Wt 82.1 kg (181 lb)   SpO2 94%   BMI 25.24 kg/m²     Physical Exam  Vitals signs reviewed.   Constitutional:       General: He is not in acute distress.     Appearance: He is well-developed. He is not ill-appearing, toxic-appearing or diaphoretic.   HENT:      Head: Normocephalic.      Right Ear: Tympanic membrane and external ear normal.      Left Ear: Tympanic membrane and external ear normal.      Nose: Nose normal.      Mouth/Throat:      Mouth: Mucous membranes are moist.      Pharynx: Uvula midline. Pharyngeal swelling and posterior oropharyngeal erythema present.      Comments: Postnasal drip  Eyes:      Extraocular Movements: Extraocular movements intact.      Conjunctiva/sclera: Conjunctivae normal.      Pupils: Pupils are equal, round, and reactive to light.   Neck:      Musculoskeletal: Normal range of motion.   Cardiovascular:      Rate and Rhythm: Tachycardia present.   Pulmonary:      Effort: Pulmonary effort is normal. No respiratory distress.   Musculoskeletal: Normal range of motion.         General: No deformity.   Lymphadenopathy:      Cervical: No cervical adenopathy.   Skin:     General: Skin is warm and dry.      Coloration: Skin is not pale.   Neurological:      Mental Status: He is alert and oriented to person, place, and time.      Sensory: No sensory deficit.      Coordination: Coordination normal.   Psychiatric:         Behavior: Behavior normal. Behavior is cooperative.       Rapid Strep A swab: negative       Assessment/Plan:     1. Pharyngitis, unspecified etiology  - POCT Rapid Strep A    2. History of environmental allergies    Various differentials discussed including allergic vs self-limiting viral vs bacterial etiologies. No signs or symptoms of acute bacterial process requiring antibiotics at this time. Suspect viral etiology, but patient also has a history of environmental allergies. Symptomatic/supportive measures and " watchful waiting for now.    Vital signs stable, afebrile, asymptomatic, no acute distress at this time. , but patient reports is chronic due to his anxiety.    Stable for self-isolation. Discussed close monitoring and supportive measures including increasing fluids and rest as well as OTC symptom management per 's instructions. Suggest initiation of Flonase and Claritin.    Advised self-isolation until free of fever, signs of a fever, or any other respiratory symptoms until 1) at least 7 days have elapsed since onset, and 2) symptom-free for at least 72 hours without the use of symptom-altering medication.    Discharge summary provided.    Warning signs reviewed. Return precautions discussed.    Patient advised to: Return for 1) Symptoms don't improve or worsen, or go to ER, 2) Follow up with primary care in 7-10 days.    Differential diagnosis, natural history, supportive care, and indications for immediate follow-up discussed. All questions answered. Patient agrees with the plan of care.    Billing note: patient billed as a new patient as the patient has not received any professional medical services from myself or another provider of the same specialty (family medicine) who belongs to the same group practice within the previous 3 years.

## 2020-05-03 NOTE — PATIENT INSTRUCTIONS
Advised self-isolation until free of fever, signs of a fever, or any other respiratory symptoms until 1) at least 7 days have elapsed since onset, and 2) symptom-free for at least 72 hours without the use of symptom-altering medication.    - nasal steroid sprays (e.g. Flonase/fluticasone  - oral daily maintenance antihistamine (e.g. Claritin/loratadine)  - OK to continue with hydroxizine as needed with above    Pharyngitis  Pharyngitis is redness, pain, and swelling (inflammation) of your pharynx.  What are the causes?  Pharyngitis is usually caused by infection. Most of the time, these infections are from viruses (viral) and are part of a cold. However, sometimes pharyngitis is caused by bacteria (bacterial). Pharyngitis can also be caused by allergies. Viral pharyngitis may be spread from person to person by coughing, sneezing, and personal items or utensils (cups, forks, spoons, toothbrushes). Bacterial pharyngitis may be spread from person to person by more intimate contact, such as kissing.  What are the signs or symptoms?  Symptoms of pharyngitis include:  · Sore throat.  · Tiredness (fatigue).  · Low-grade fever.  · Headache.  · Joint pain and muscle aches.  · Skin rashes.  · Swollen lymph nodes.  · Plaque-like film on throat or tonsils (often seen with bacterial pharyngitis).  How is this diagnosed?  Your health care provider will ask you questions about your illness and your symptoms. Your medical history, along with a physical exam, is often all that is needed to diagnose pharyngitis. Sometimes, a rapid strep test is done. Other lab tests may also be done, depending on the suspected cause.  How is this treated?  Viral pharyngitis will usually get better in 3-4 days without the use of medicine. Bacterial pharyngitis is treated with medicines that kill germs (antibiotics).  Follow these instructions at home:  · Drink enough water and fluids to keep your urine clear or pale yellow.  · Only take  over-the-counter or prescription medicines as directed by your health care provider:  ¨ If you are prescribed antibiotics, make sure you finish them even if you start to feel better.  ¨ Do not take aspirin.  · Get lots of rest.  · Gargle with 8 oz of salt water (½ tsp of salt per 1 qt of water) as often as every 1-2 hours to soothe your throat.  · Throat lozenges (if you are not at risk for choking) or sprays may be used to soothe your throat.  Contact a health care provider if:  · You have large, tender lumps in your neck.  · You have a rash.  · You cough up green, yellow-brown, or bloody spit.  Get help right away if:  · Your neck becomes stiff.  · You drool or are unable to swallow liquids.  · You vomit or are unable to keep medicines or liquids down.  · You have severe pain that does not go away with the use of recommended medicines.  · You have trouble breathing (not caused by a stuffy nose).  This information is not intended to replace advice given to you by your health care provider. Make sure you discuss any questions you have with your health care provider.  Document Released: 12/18/2006 Document Revised: 05/25/2017 Document Reviewed: 08/25/2014  [employer] Patient Education © 2017 [employer]

## 2020-08-11 ENCOUNTER — HOSPITAL ENCOUNTER (EMERGENCY)
Facility: MEDICAL CENTER | Age: 35
End: 2020-08-11
Attending: EMERGENCY MEDICINE
Payer: COMMERCIAL

## 2020-08-11 VITALS
WEIGHT: 181 LBS | HEIGHT: 71 IN | DIASTOLIC BLOOD PRESSURE: 58 MMHG | OXYGEN SATURATION: 97 % | BODY MASS INDEX: 25.34 KG/M2 | HEART RATE: 94 BPM | TEMPERATURE: 97.4 F | SYSTOLIC BLOOD PRESSURE: 115 MMHG | RESPIRATION RATE: 18 BRPM

## 2020-08-11 DIAGNOSIS — R07.89 ATYPICAL CHEST PAIN: ICD-10-CM

## 2020-08-11 DIAGNOSIS — F10.231 ALCOHOL DEPENDENCE WITH WITHDRAWAL DELIRIUM (HCC): ICD-10-CM

## 2020-08-11 DIAGNOSIS — F10.10 ALCOHOL ABUSE: ICD-10-CM

## 2020-08-11 LAB — EKG IMPRESSION: NORMAL

## 2020-08-11 PROCEDURE — 93005 ELECTROCARDIOGRAM TRACING: CPT | Performed by: EMERGENCY MEDICINE

## 2020-08-11 PROCEDURE — 93005 ELECTROCARDIOGRAM TRACING: CPT

## 2020-08-11 PROCEDURE — 99284 EMERGENCY DEPT VISIT MOD MDM: CPT

## 2020-08-11 RX ORDER — CHLORDIAZEPOXIDE HYDROCHLORIDE 25 MG/1
CAPSULE, GELATIN COATED ORAL
Qty: 26 CAP | Refills: 0 | Status: SHIPPED | OUTPATIENT
Start: 2020-08-11 | End: 2020-08-19

## 2020-08-11 ASSESSMENT — FIBROSIS 4 INDEX: FIB4 SCORE: 5.08

## 2020-08-12 NOTE — ED PROVIDER NOTES
"ED Provider Note    Scribed for Parth Montano M.D. by Parth Montano M.D.. 8/11/2020,  7:54 PM.    CHIEF COMPLAINT  Chief Complaint   Patient presents with   • ETOH Intoxication     sober x 4 months, went on 8 day rucker. last drink 2 hrs ago    • Chest Pain     pressure all day. feels similiar to anxiety in past        HPI  Maxime Fernandez is a 34 y.o. male who presents to the Emergency Department complaining of needing help with alcohol abuse, and of chest pressure which she thinks is anxiety, which he has been medicated for and has self medicated for with alcohol in the past.  He said that he was sober for 4 months, and then has recently been on an 8-day drinking binge with the last drink 2 hours ago.  He said that this is happened in large part because of the recent death of a friend.  He has not had any exertional chest pain, vomiting, diaphoresis or orthopnea.  He is resting comfortably at the bedside.  He denies suicidal or homicidal thoughts.  He does appear intoxicated.    REVIEW OF SYSTEMS  See HPI for further details. All other systems are negative.     PAST MEDICAL HISTORY   has a past medical history of Anxiety.  History of alcoholism.    SOCIAL HISTORY  Social History     Tobacco Use   • Smoking status: Never Smoker   • Smokeless tobacco: Never Used   Substance and Sexual Activity   • Alcohol use: Yes     Comment: socially every 3 weeks (5-6 beers or cocktails); used to drink 2-3 beers daily   • Drug use: No   • Sexual activity: Yes     Partners: Female     Social History     Substance and Sexual Activity   Drug Use No       SURGICAL HISTORY  patient denies any surgical history    CURRENT MEDICATIONS  Home Medications    **Home medications have not yet been reviewed for this encounter**         ALLERGIES  No Known Allergies    PHYSICAL EXAM  VITAL SIGNS: /58   Pulse 94   Temp 36.3 °C (97.4 °F) (Oral)   Resp 18   Ht 1.803 m (5' 11\")   Wt 82.1 kg (181 lb)   SpO2 97%   BMI " 25.24 kg/m²   Pulse ox interpretation: I interpret this pulse ox as normal.  Constitutional: Alert, anxious.  Clearly intoxicated.  HENT: No signs of trauma, Bilateral external ears normal, Nose normal.   Eyes: Conjunctiva normal, Non-icteric.   Neck: Normal range of motion, Supple, No stridor.   Lymphatic: No lymphadenopathy noted.   Cardiovascular: Regular slightly tachycardic rate and rhythm, no murmurs.   Thorax & Lungs: Normal breath sounds, No respiratory distress, No wheezing, No chest tenderness.   Abdomen: Bowel sounds normal, Soft, No tenderness, No masses, No pulsatile masses. No peritoneal signs.  Skin: Warm, Dry, No erythema, No rash.   Extremities: Intact distal pulses, No edema, No cyanosis.  Musculoskeletal: Good range of motion in all major joints. No or major deformities noted.   Neurologic: Alert , Normal motor function, Normal sensory function, No focal deficits noted.   Psychiatric: Affect anxious.  Judgment normal, Mood normal.     COURSE & MEDICAL DECISION MAKING  Nursing notes, VS, PMSFHx reviewed in chart.     7:54 PM Patient seen and examined at bedside.  I do not think his chest pain relates to acute coronary syndrome.  He is experiencing it is a component of anxiety, similar with previous episodes in the past.  He is having a lot of anxiety due to his alcoholism and the recent death of a friend.  I think this patient's problem is clearly alcohol related, and he agrees.  He is asking for some medication assistance, and is agreeable to talking to the alert team about resources.    9:32 PM the alert team has evaluated this patient, who is agreeable to inpatient detox, and is going to [facility].     The patient will return for new or worsening symptoms and is stable at the time of discharge.    The patient is referred to a primary physician for blood pressure management, diabetic screening, and for all other preventative health concerns.    DISPOSITION:  Patient will be  discharged home in stable condition.    FOLLOW UP:  [facility]  [address] [facility] [id number] [phone]        [facility]  [address] [address]  [phone]        [facility]  [address] [id number] [phone]        [facility]  [address] [id number] [phone]          OUTPATIENT MEDICATIONS:  Discharge Medication List as of 8/11/2020  9:35 PM      START taking these medications    Details   chlordiazepoxide (LIBRIUM) 25 MG Cap Take 2 Caps by mouth 3 times a day for 2 days, THEN 2 Caps 2 Times a Day for 2 days, THEN 1 Cap 2 Times a Day for 2 days, THEN 1 Cap every day for 2 days., Disp-26 Cap,R-0, Normal               FINAL IMPRESSION  1. Alcohol abuse    2. Atypical chest pain    3. Alcohol dependence with withdrawal delirium (HCC)

## 2020-08-12 NOTE — DISCHARGE INSTRUCTIONS
Use the clinics listed here to find help with your alcohol recovery.  Starting tomorrow, use the medication that we have prescribed to prevent withdrawal symptoms.

## 2020-08-12 NOTE — ED TRIAGE NOTES
"Chief Complaint   Patient presents with   • ETOH Intoxication     sober x 4 months, went on 8 day rucker. last drink 2 hrs ago    • Chest Pain     pressure all day. feels similiar to anxiety in past      Pt BIB EMS for above complaint. Pt report recent death of friend, triggering him to drink. Pt anxious, tearful.   EKG complete.     /55   Pulse (!) 114   Temp 36.7 °C (98 °F) (Temporal)   Resp 18   Ht 1.803 m (5' 11\")   Wt 82.1 kg (181 lb)   SpO2 96%   BMI 25.24 kg/m²     "

## 2020-08-12 NOTE — DISCHARGE PLANNING
Medical Social Work    Cab voucher (# [id number]) given to bedside RN per Alert Team for pt to get to [facility] safely for detox.

## 2020-08-12 NOTE — DISCHARGE PLANNING
Alert Team:    Spoke with patient regarding alcohol detox options and therapy for grief. Patient agreeable to return to [facility] for further alcohol detox and assistance connecting counseling. Cab voucher provided to [facility].

## 2020-08-12 NOTE — ED NOTES
Pt discharged. Pt verbalized understand of discharge and medication instructions, all questions addressed. Pts wife will be driving him to [facility].   amb with steady gait. VSS. PIV removed.

## 2020-10-05 ENCOUNTER — HOSPITAL ENCOUNTER (EMERGENCY)
Facility: MEDICAL CENTER | Age: 35
End: 2020-10-06
Attending: EMERGENCY MEDICINE
Payer: MEDICAID

## 2020-10-05 DIAGNOSIS — F10.920 ALCOHOLIC INTOXICATION WITHOUT COMPLICATION (HCC): ICD-10-CM

## 2020-10-05 PROCEDURE — 99284 EMERGENCY DEPT VISIT MOD MDM: CPT

## 2020-10-05 PROCEDURE — 36415 COLL VENOUS BLD VENIPUNCTURE: CPT

## 2020-10-05 ASSESSMENT — FIBROSIS 4 INDEX: FIB4 SCORE: 5.08

## 2020-10-06 ENCOUNTER — HOSPITAL ENCOUNTER (OUTPATIENT)
Dept: HOSPITAL 8 - ED | Age: 35
Setting detail: OBSERVATION
LOS: 2 days | Discharge: LEFT BEFORE DISCHARGE | End: 2020-10-08
Attending: HOSPITALIST | Admitting: HOSPITALIST
Payer: COMMERCIAL

## 2020-10-06 VITALS
BODY MASS INDEX: 25.8 KG/M2 | HEART RATE: 122 BPM | TEMPERATURE: 97.7 F | SYSTOLIC BLOOD PRESSURE: 135 MMHG | RESPIRATION RATE: 16 BRPM | WEIGHT: 185 LBS | DIASTOLIC BLOOD PRESSURE: 91 MMHG | OXYGEN SATURATION: 95 %

## 2020-10-06 VITALS — HEIGHT: 69 IN | WEIGHT: 191.8 LBS | BODY MASS INDEX: 28.41 KG/M2

## 2020-10-06 DIAGNOSIS — S00.81XA: ICD-10-CM

## 2020-10-06 DIAGNOSIS — Z79.899: ICD-10-CM

## 2020-10-06 DIAGNOSIS — F41.8: ICD-10-CM

## 2020-10-06 DIAGNOSIS — F10.229: Primary | ICD-10-CM

## 2020-10-06 DIAGNOSIS — R00.0: ICD-10-CM

## 2020-10-06 DIAGNOSIS — F10.239: ICD-10-CM
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
## 2020-10-06 DIAGNOSIS — R41.0: ICD-10-CM
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
## 2020-10-06 DIAGNOSIS — G31.2: ICD-10-CM

## 2020-10-06 DIAGNOSIS — R11.2: ICD-10-CM

## 2020-10-06 DIAGNOSIS — E87.1: ICD-10-CM

## 2020-10-06 DIAGNOSIS — E86.0: ICD-10-CM

## 2020-10-06 DIAGNOSIS — R45.4: ICD-10-CM

## 2020-10-06 DIAGNOSIS — E87.6: ICD-10-CM

## 2020-10-06 LAB
ALBUMIN SERPL-MCNC: 4.4 G/DL (ref 3.4–5)
ANION GAP SERPL CALC-SCNC: 16 MMOL/L (ref 5–15)
BASOPHILS # BLD AUTO: 0 X10^3/UL (ref 0–0.1)
BASOPHILS NFR BLD AUTO: 1 % (ref 0–1)
CALCIUM SERPL-MCNC: 8.5 MG/DL (ref 8.5–10.1)
CHLORIDE SERPL-SCNC: 95 MMOL/L (ref 98–107)
CREAT SERPL-MCNC: 0.91 MG/DL (ref 0.7–1.3)
EOSINOPHIL # BLD AUTO: 0 X10^3/UL (ref 0–0.4)
EOSINOPHIL NFR BLD AUTO: 0 % (ref 1–7)
ERYTHROCYTE [DISTWIDTH] IN BLOOD BY AUTOMATED COUNT: 14.2 % (ref 9.4–14.8)
LYMPHOCYTES # BLD AUTO: 1 X10^3/UL (ref 1–3.4)
LYMPHOCYTES NFR BLD AUTO: 19 % (ref 22–44)
MCH RBC QN AUTO: 31.2 PG (ref 27.5–34.5)
MCHC RBC AUTO-ENTMCNC: 34.8 G/DL (ref 33.2–36.2)
MD: NO
MONOCYTES # BLD AUTO: 0.2 X10^3/UL (ref 0.2–0.8)
MONOCYTES NFR BLD AUTO: 4 % (ref 2–9)
NEUTROPHILS # BLD AUTO: 3.9 X10^3/UL (ref 1.8–6.8)
NEUTROPHILS NFR BLD AUTO: 77 % (ref 42–75)
PLATELET # BLD AUTO: 208 X10^3/UL (ref 130–400)
PMV BLD AUTO: 9.1 FL (ref 7.4–10.4)
RBC # BLD AUTO: 5.3 X10^6/UL (ref 4.38–5.82)

## 2020-10-06 PROCEDURE — G0378 HOSPITAL OBSERVATION PER HR: HCPCS

## 2020-10-06 PROCEDURE — 80053 COMPREHEN METABOLIC PANEL: CPT

## 2020-10-06 PROCEDURE — 83735 ASSAY OF MAGNESIUM: CPT

## 2020-10-06 PROCEDURE — 96375 TX/PRO/DX INJ NEW DRUG ADDON: CPT

## 2020-10-06 PROCEDURE — A9270 NON-COVERED ITEM OR SERVICE: HCPCS | Performed by: EMERGENCY MEDICINE

## 2020-10-06 PROCEDURE — 80048 BASIC METABOLIC PNL TOTAL CA: CPT

## 2020-10-06 PROCEDURE — 93005 ELECTROCARDIOGRAM TRACING: CPT

## 2020-10-06 PROCEDURE — 96376 TX/PRO/DX INJ SAME DRUG ADON: CPT

## 2020-10-06 PROCEDURE — 99285 EMERGENCY DEPT VISIT HI MDM: CPT

## 2020-10-06 PROCEDURE — 96372 THER/PROPH/DIAG INJ SC/IM: CPT

## 2020-10-06 PROCEDURE — 82040 ASSAY OF SERUM ALBUMIN: CPT

## 2020-10-06 PROCEDURE — 96368 THER/DIAG CONCURRENT INF: CPT

## 2020-10-06 PROCEDURE — 80307 DRUG TEST PRSMV CHEM ANLYZR: CPT

## 2020-10-06 PROCEDURE — 84443 ASSAY THYROID STIM HORMONE: CPT

## 2020-10-06 PROCEDURE — 700102 HCHG RX REV CODE 250 W/ 637 OVERRIDE(OP): Performed by: EMERGENCY MEDICINE

## 2020-10-06 PROCEDURE — 96367 TX/PROPH/DG ADDL SEQ IV INF: CPT

## 2020-10-06 PROCEDURE — 85025 COMPLETE CBC W/AUTO DIFF WBC: CPT

## 2020-10-06 PROCEDURE — 36415 COLL VENOUS BLD VENIPUNCTURE: CPT

## 2020-10-06 PROCEDURE — 70450 CT HEAD/BRAIN W/O DYE: CPT

## 2020-10-06 PROCEDURE — 96361 HYDRATE IV INFUSION ADD-ON: CPT

## 2020-10-06 PROCEDURE — 96365 THER/PROPH/DIAG IV INF INIT: CPT

## 2020-10-06 PROCEDURE — 81003 URINALYSIS AUTO W/O SCOPE: CPT

## 2020-10-06 PROCEDURE — 96366 THER/PROPH/DIAG IV INF ADDON: CPT

## 2020-10-06 RX ORDER — LORAZEPAM 1 MG/1
1 TABLET ORAL ONCE
Status: COMPLETED | OUTPATIENT
Start: 2020-10-06 | End: 2020-10-06

## 2020-10-06 RX ADMIN — LORAZEPAM 1 MG: 1 TABLET ORAL at 03:49

## 2020-10-06 NOTE — ED NOTES
Pt ambulatory with steady gait to nursing station requesting something to drink. Pt provided with water and ice x2 at this time.

## 2020-10-06 NOTE — ED PROVIDER NOTES
ED Provider Note    ER PROVIDER NOTE      CHIEF COMPLAINT  Chief Complaint   Patient presents with   • Alcohol Intoxication     sent from [place], BLANCA 0.437, abrasion head, pt sts LOC       HPI  Maxime Fernandez is a 34 y.o. male who presents to the emergency department complaining of alcohol intoxication.  Patient initially went to [place] for his alcohol abuse, his blood alcohol was 437 and he was sent here because he could not ambulate.  Patient denies any complaints at this time, states no headaches, neck pain, nausea vomiting or abdominal pain.  He does have an abrasion to the head that he states he thinks he hit his head 2 days ago.  No recent fevers chills cough or congestion.  No other complaints    REVIEW OF SYSTEMS  Pertinent positives include alcohol intoxication. Pertinent negatives include no vomiting. See HPI for details. All other systems reviewed and are negative.    PAST MEDICAL HISTORY   has a past medical history of Anxiety.    SURGICAL HISTORY  patient denies any surgical history    FAMILY HISTORY  Family History   Problem Relation Age of Onset   • Thyroid Mother    • Hyperlipidemia Father    • No Known Problems Sister    • No Known Problems Brother        SOCIAL HISTORY  Social History     Socioeconomic History   • Marital status: Single     Spouse name: Not on file   • Number of children: Not on file   • Years of education: Not on file   • Highest education level: Not on file   Occupational History   • Not on file   Social Needs   • Financial resource strain: Not on file   • Food insecurity     Worry: Not on file     Inability: Not on file   • Transportation needs     Medical: Not on file     Non-medical: Not on file   Tobacco Use   • Smoking status: Never Smoker   • Smokeless tobacco: Never Used   Substance and Sexual Activity   • Alcohol use: Yes     Comment: socially every 3 weeks (5-6 beers or cocktails); used to drink 2-3 beers daily   • Drug use: No   • Sexual activity: Yes      Partners: Female   Lifestyle   • Physical activity     Days per week: Not on file     Minutes per session: Not on file   • Stress: Not on file   Relationships   • Social connections     Talks on phone: Not on file     Gets together: Not on file     Attends Rastafarian service: Not on file     Active member of club or organization: Not on file     Attends meetings of clubs or organizations: Not on file     Relationship status: Not on file   • Intimate partner violence     Fear of current or ex partner: Not on file     Emotionally abused: Not on file     Physically abused: Not on file     Forced sexual activity: Not on file   Other Topics Concern   • Not on file   Social History Narrative     for [employer]      Social History     Substance and Sexual Activity   Drug Use No       CURRENT MEDICATIONS  Home Medications     Reviewed by Libra Foster R.N. (Registered Nurse) on 10/05/20 at 2237  Med List Status: Not Addressed   Medication Last Dose Status   escitalopram (LEXAPRO) 10 MG Tab  Active   folic acid (FOLVITE) 1 MG Tab  Active   hydrOXYzine pamoate (VISTARIL) 50 MG Cap  Active   multivitamin (THERAGRAN) Tab  Active   thiamine (THIAMINE) 100 MG tablet  Active   zolpidem (AMBIEN) 10 MG Tab  Active                ALLERGIES  No Known Allergies    PHYSICAL EXAM  VITAL SIGNS: /86   Pulse (!) 128   Temp 36.4 °C (97.5 °F) (Temporal)   Resp 16   Wt 83.9 kg (185 lb)   SpO2 96%   BMI 25.80 kg/m²   Pulse ox interpretation: I interpret this pulse ox as normal.    Constitutional: Somnolent but arousable to verbal stimuli disheveled, smells of alcohol  HENT: No signs of trauma, Bilateral external ears normal, Nose normal.  Mucous membranes mildly dry  Eyes: Pupils are equal and reactive, Conjunctiva normal, Non-icteric.   Neck: Normal range of motion, No tenderness, Supple, No stridor.   Lymphatic: No lymphadenopathy noted.   Cardiovascular: Regular rate and rhythm, no murmurs.   Thorax & Lungs: Normal  breath sounds, No respiratory distress, No wheezing, No chest tenderness.   Abdomen: Bowel sounds normal, Soft, No tenderness, No masses, No pulsatile masses. No peritoneal signs.  Skin: Warm, Dry, No erythema, No rash.   Back: No bony tenderness, No CVA tenderness.   Extremities: Intact distal pulses, No edema, No tenderness, No cyanosis,.  Musculoskeletal: Good range of motion in all major joints. No tenderness to palpation or major deformities noted.   Neurologic: Somnolent but arousable to verbal stimuli, bilateral horizontal nystagmus, slurred speech otherwise normal motor function, Normal sensory function, No focal deficits noted.   Psychiatric: Affect normal, Judgment normal, Mood normal.     DIAGNOSTIC STUDIES / PROCEDURES      RADIOLOGY  No orders to display     The radiologist's interpretation of all radiological studies have been reviewed and images independently viewed by me.    COURSE & MEDICAL DECISION MAKING  Nursing notes, VS, PMSFHx reviewed in chart.    10:44 PM Patient seen and examined at bedside.  At this point patient appears to be intoxicated, will monitor for sobriety    12:04 AM patient reevaluated, he is still quite sleepy but easily arousable, will continue to monitor    2:43 AM  Patient reevaluated again, much more easily arousable although still some slurred speech, continue to monitor for sobriety, no complaints at this time    3:48 AM  Patient is awake, alert, ambulatory, slightly tachycardic but otherwise well-appearing, plan for discharge      In review of patient records he has had multiple similar presentations for alcohol abuse    This patient was cared for during the COVID-19 pandemic.  History and physical exam may be limited/truncated by the inherent challenges of PPE and the need to decrease staff exposure to novel coronavirus.  Some aspects of disease management may be different to protect staff and help slow the spread of disease. I verified that, if possible, the patient  was wearing a mask and I was wearing appropriate PPE every time I encountered the patient.     Current [facility] COVID protocol followed      Decision Makin y.o. y.o. male seen in ED for alcohol intoxication.  The patient was clinically intoxicated on hx and PE   There is no evidence of head trauma without any evidence of laceration, contusions, hematomas or fractures. Doubt significant intracranial bleed.  There is no evidence of SBI with stable vital signs and no pain of focal infx complaints.  The pt has a supple neck and no headache, therefore low suspicion for meningitis, encephalitis for the cause of this pts AMS.   Although serum chemistries were not drawn, the normal progression of sobriety and the pts clear sensorium at this time make a significant electrolyte abnormality or metabolic disturbance or dangerous toxic ingestion as a cause for the pts AMS unlikely.   The pt sobered appropriately within the department and had a normal neurological examination. The pt is alert and oriented times 3 and has a normal and steady gate.  He was slightly tachycardic, I think secondary to dehydration but as he is tolerating orals now I feel appropriate for continued outpatient management, he is not tremulous suggestive of a significant alcohol withdrawal at this point  The pt was discharged from the departmen after being counseled on alcohol sesation. Instructions to follow up with a PCP for any concerns were given. Strict return precautions were also given.   The patient will return for new or worsening symptoms and is stable at the time of discharge.    The patient is referred to a primary physician for blood pressure management, diabetic screening, and for all other preventative health concerns.      DISPOSITION:  Patient will be discharged home in stable condition.    FOLLOW UP:  [address] [address]  [phone]          OUTPATIENT MEDICATIONS:  New Prescriptions    No  medications on file       FINAL IMPRESSION  1. Alcoholic intoxication without complication (HCC)         The note accurately reflects work and decisions made by me.  Ricardo Goodwin M.D.  10/6/2020  3:50 AM

## 2020-10-06 NOTE — ED TRIAGE NOTES
Chief Complaint   Patient presents with   • Alcohol Intoxication     sent from [place], BLANCA 0.437, abrasion head, pt sts LOC     Pt BIB EMS. Pt was at [place] for detox but ETOH was to high and they sent him to the ED. BLANCA 0.437 per EMS. Pt GCS 15 and protecting airway. Pt has abrasion on forehead, EMS placed pt in C collar per EMS protocol. Pt calm and cooperative. Pt has been binge drinking x 2-3 wks.    /94   Pulse (!) 120   Temp 36.4 °C (97.5 °F) (Temporal)   Resp 16   Wt 83.9 kg (185 lb)   SpO2 97%   BMI 25.80 kg/m²

## 2020-10-06 NOTE — NUR
BIB EMS FOR ETOH INTOXICATION.  DRANK 5 SHOTS OF EARLY TIMES TODAY.  PT WANTS 
TO GET OFF ALCOHOL.  HE FEELS THAT HE DRINKS ALCOHOL TO SELF-MEDICATE FOR 
ANXIETY.  PT WAS SENT TO [facility] FROM [facility] YESTERDAY FOR MEDICAL CLEARANCE THEN 
DISCHARGED FROM [facility] BUT DIDN'T GO BACK TO [facility] FOR DETOX.

## 2020-10-06 NOTE — ED NOTES
Discharge teaching and paperwork provided. VSS, assessment stable and PIV removed.    Patient refusing to leave ,patient reports he does not feel he should be discharged. Notified Dr. Goodwin, ERP came to bedside. ERP had discussion with patient that he is medically cleared to discharge.     Called cab for patient to take home to family.

## 2020-10-06 NOTE — ED NOTES
Notified ERP regarding heart rate 130 to 140's, ERP came to bedside. Patient reports he is anxious, while ERP at bedside, patient decreased to 120's.

## 2020-10-06 NOTE — NUR
Pt complaining of "feeling like he is withdrawaling". Pt has a H/A. States he 
is "seeing people". Pt tachycardic 120 HR. MD notified. MD ordered 1mg ativan 
IVP. WCTM

## 2020-10-07 VITALS — DIASTOLIC BLOOD PRESSURE: 82 MMHG | SYSTOLIC BLOOD PRESSURE: 132 MMHG

## 2020-10-07 VITALS — DIASTOLIC BLOOD PRESSURE: 86 MMHG | SYSTOLIC BLOOD PRESSURE: 120 MMHG

## 2020-10-07 VITALS — SYSTOLIC BLOOD PRESSURE: 144 MMHG | DIASTOLIC BLOOD PRESSURE: 88 MMHG

## 2020-10-07 VITALS — DIASTOLIC BLOOD PRESSURE: 89 MMHG | SYSTOLIC BLOOD PRESSURE: 153 MMHG

## 2020-10-07 VITALS — DIASTOLIC BLOOD PRESSURE: 80 MMHG | SYSTOLIC BLOOD PRESSURE: 147 MMHG

## 2020-10-07 VITALS — SYSTOLIC BLOOD PRESSURE: 143 MMHG | DIASTOLIC BLOOD PRESSURE: 86 MMHG

## 2020-10-07 LAB
ANION GAP SERPL CALC-SCNC: 13 MMOL/L (ref 5–15)
CALCIUM SERPL-MCNC: 7.5 MG/DL (ref 8.5–10.1)
CHLORIDE SERPL-SCNC: 97 MMOL/L (ref 98–107)
CREAT SERPL-MCNC: 0.75 MG/DL (ref 0.7–1.3)

## 2020-10-07 RX ADMIN — LORAZEPAM PRN MG: 2 INJECTION INTRAMUSCULAR; INTRAVENOUS at 17:42

## 2020-10-07 RX ADMIN — LORAZEPAM PRN MG: 2 INJECTION INTRAMUSCULAR; INTRAVENOUS at 15:34

## 2020-10-07 RX ADMIN — LORAZEPAM PRN MG: 2 INJECTION INTRAMUSCULAR; INTRAVENOUS at 08:38

## 2020-10-07 RX ADMIN — PROMETHAZINE HYDROCHLORIDE PRN MG: 25 INJECTION INTRAMUSCULAR; INTRAVENOUS at 08:47

## 2020-10-07 RX ADMIN — SODIUM CHLORIDE AND POTASSIUM CHLORIDE SCH MLS/HR: 9; 2.98 INJECTION, SOLUTION INTRAVENOUS at 16:17

## 2020-10-07 RX ADMIN — PROMETHAZINE HYDROCHLORIDE PRN MG: 25 INJECTION INTRAMUSCULAR; INTRAVENOUS at 13:33

## 2020-10-07 RX ADMIN — ENOXAPARIN SODIUM SCH MG: 40 INJECTION SUBCUTANEOUS at 06:00

## 2020-10-07 RX ADMIN — Medication SCH TAB: at 08:22

## 2020-10-07 RX ADMIN — Medication SCH MG: at 08:22

## 2020-10-07 RX ADMIN — LORAZEPAM PRN MG: 2 INJECTION INTRAMUSCULAR; INTRAVENOUS at 20:50

## 2020-10-07 RX ADMIN — LORAZEPAM PRN MG: 2 INJECTION INTRAMUSCULAR; INTRAVENOUS at 11:22

## 2020-10-07 RX ADMIN — FOLIC ACID SCH MG: 1 TABLET ORAL at 08:22

## 2020-10-07 NOTE — NUR
Float RN: Pt sinus tachy in 140's on monitor. EKG completed. PT is drowsy but 
A&Ox4. No tremors noted. Otherwise VSS. ERP aware. Pt to be admitted for closer 
obs. Orders for zofran received and pt medicated. Pt with 600 ml output using 
urinal.

## 2020-10-08 VITALS — SYSTOLIC BLOOD PRESSURE: 138 MMHG | DIASTOLIC BLOOD PRESSURE: 86 MMHG

## 2020-10-08 VITALS — DIASTOLIC BLOOD PRESSURE: 97 MMHG | SYSTOLIC BLOOD PRESSURE: 156 MMHG

## 2020-10-08 VITALS — SYSTOLIC BLOOD PRESSURE: 162 MMHG | DIASTOLIC BLOOD PRESSURE: 97 MMHG

## 2020-10-08 VITALS — SYSTOLIC BLOOD PRESSURE: 155 MMHG | DIASTOLIC BLOOD PRESSURE: 92 MMHG

## 2020-10-08 LAB
ALBUMIN SERPL-MCNC: 3.7 G/DL (ref 3.4–5)
ALP SERPL-CCNC: 65 U/L (ref 45–117)
ALT SERPL-CCNC: 52 U/L (ref 12–78)
ANION GAP SERPL CALC-SCNC: 11 MMOL/L (ref 5–15)
BASOPHILS # BLD AUTO: 0 X10^3/UL (ref 0–0.1)
BASOPHILS NFR BLD AUTO: 1 % (ref 0–1)
BILIRUB SERPL-MCNC: 1.7 MG/DL (ref 0.2–1)
CALCIUM SERPL-MCNC: 8 MG/DL (ref 8.5–10.1)
CHLORIDE SERPL-SCNC: 96 MMOL/L (ref 98–107)
CREAT SERPL-MCNC: 0.76 MG/DL (ref 0.7–1.3)
EOSINOPHIL # BLD AUTO: 0 X10^3/UL (ref 0–0.4)
EOSINOPHIL NFR BLD AUTO: 1 % (ref 1–7)
ERYTHROCYTE [DISTWIDTH] IN BLOOD BY AUTOMATED COUNT: 14.3 % (ref 9.4–14.8)
LYMPHOCYTES # BLD AUTO: 0.8 X10^3/UL (ref 1–3.4)
LYMPHOCYTES NFR BLD AUTO: 12 % (ref 22–44)
MCH RBC QN AUTO: 31.2 PG (ref 27.5–34.5)
MCHC RBC AUTO-ENTMCNC: 34.3 G/DL (ref 33.2–36.2)
MD: (no result)
MICROSCOPIC: (no result)
MONOCYTES # BLD AUTO: 0.3 X10^3/UL (ref 0.2–0.8)
MONOCYTES NFR BLD AUTO: 4 % (ref 2–9)
NEUTROPHILS # BLD AUTO: 5.2 X10^3/UL (ref 1.8–6.8)
NEUTROPHILS NFR BLD AUTO: 83 % (ref 42–75)
PLATELET # BLD AUTO: 137 X10^3/UL (ref 130–400)
PMV BLD AUTO: 9.2 FL (ref 7.4–10.4)
PROT SERPL-MCNC: 7.1 G/DL (ref 6.4–8.2)
RBC # BLD AUTO: 4.52 X10^6/UL (ref 4.38–5.82)

## 2020-10-08 RX ADMIN — SODIUM CHLORIDE AND POTASSIUM CHLORIDE SCH MLS/HR: 9; 2.98 INJECTION, SOLUTION INTRAVENOUS at 09:00

## 2020-10-08 RX ADMIN — LORAZEPAM PRN MG: 2 INJECTION INTRAMUSCULAR; INTRAVENOUS at 08:26

## 2020-10-08 RX ADMIN — POTASSIUM CHLORIDE SCH MEQ: 20 TABLET, EXTENDED RELEASE ORAL at 17:19

## 2020-10-08 RX ADMIN — ENOXAPARIN SODIUM SCH MG: 40 INJECTION SUBCUTANEOUS at 05:03

## 2020-10-08 RX ADMIN — LORAZEPAM PRN MG: 2 INJECTION INTRAMUSCULAR; INTRAVENOUS at 15:08

## 2020-10-08 RX ADMIN — Medication SCH MG: at 08:17

## 2020-10-08 RX ADMIN — POTASSIUM CHLORIDE SCH MEQ: 20 TABLET, EXTENDED RELEASE ORAL at 08:17

## 2020-10-08 RX ADMIN — Medication SCH TAB: at 08:17

## 2020-10-08 RX ADMIN — ENOXAPARIN SODIUM SCH MG: 40 INJECTION SUBCUTANEOUS at 08:17

## 2020-10-08 RX ADMIN — FOLIC ACID SCH MG: 1 TABLET ORAL at 08:17

## 2020-11-23 ENCOUNTER — HOSPITAL ENCOUNTER (EMERGENCY)
Facility: MEDICAL CENTER | Age: 35
End: 2020-11-23
Attending: EMERGENCY MEDICINE
Payer: MEDICAID

## 2020-11-23 VITALS
SYSTOLIC BLOOD PRESSURE: 125 MMHG | HEIGHT: 71 IN | RESPIRATION RATE: 20 BRPM | DIASTOLIC BLOOD PRESSURE: 85 MMHG | OXYGEN SATURATION: 94 % | BODY MASS INDEX: 25.9 KG/M2 | HEART RATE: 100 BPM | TEMPERATURE: 98.7 F | WEIGHT: 185 LBS

## 2020-11-23 VITALS
HEART RATE: 106 BPM | TEMPERATURE: 97.8 F | SYSTOLIC BLOOD PRESSURE: 140 MMHG | DIASTOLIC BLOOD PRESSURE: 84 MMHG | RESPIRATION RATE: 16 BRPM | OXYGEN SATURATION: 97 %

## 2020-11-23 DIAGNOSIS — E88.89 ALCOHOLIC KETOSIS (HCC): ICD-10-CM

## 2020-11-23 DIAGNOSIS — F10.930 ALCOHOL WITHDRAWAL SYNDROME WITHOUT COMPLICATION (HCC): ICD-10-CM

## 2020-11-23 DIAGNOSIS — I10 ESSENTIAL HYPERTENSION: ICD-10-CM

## 2020-11-23 LAB
ALBUMIN SERPL BCP-MCNC: 4.6 G/DL (ref 3.2–4.9)
ALBUMIN/GLOB SERPL: 1.5 G/DL
ALP SERPL-CCNC: 90 U/L (ref 30–99)
ALT SERPL-CCNC: 105 U/L (ref 2–50)
ANION GAP SERPL CALC-SCNC: 28 MMOL/L (ref 7–16)
APPEARANCE UR: CLEAR
AST SERPL-CCNC: 70 U/L (ref 12–45)
BACTERIA #/AREA URNS HPF: ABNORMAL /HPF
BASOPHILS # BLD AUTO: 0.5 % (ref 0–1.8)
BASOPHILS # BLD: 0.04 K/UL (ref 0–0.12)
BILIRUB SERPL-MCNC: 0.7 MG/DL (ref 0.1–1.5)
BILIRUB UR QL STRIP.AUTO: NEGATIVE
BUN SERPL-MCNC: 18 MG/DL (ref 8–22)
CALCIUM SERPL-MCNC: 8.5 MG/DL (ref 8.4–10.2)
CHLORIDE SERPL-SCNC: 93 MMOL/L (ref 96–112)
CO2 SERPL-SCNC: 17 MMOL/L (ref 20–33)
COLOR UR: YELLOW
CREAT SERPL-MCNC: 0.87 MG/DL (ref 0.5–1.4)
EKG IMPRESSION: NORMAL
EKG IMPRESSION: NORMAL
EOSINOPHIL # BLD AUTO: 0.01 K/UL (ref 0–0.51)
EOSINOPHIL NFR BLD: 0.1 % (ref 0–6.9)
EPI CELLS #/AREA URNS HPF: NEGATIVE /HPF
ERYTHROCYTE [DISTWIDTH] IN BLOOD BY AUTOMATED COUNT: 42.7 FL (ref 35.9–50)
GLOBULIN SER CALC-MCNC: 3.1 G/DL (ref 1.9–3.5)
GLUCOSE SERPL-MCNC: 81 MG/DL (ref 65–99)
GLUCOSE UR STRIP.AUTO-MCNC: NEGATIVE MG/DL
HCT VFR BLD AUTO: 43.7 % (ref 42–52)
HGB BLD-MCNC: 15.6 G/DL (ref 14–18)
IMM GRANULOCYTES # BLD AUTO: 0.02 K/UL (ref 0–0.11)
IMM GRANULOCYTES NFR BLD AUTO: 0.3 % (ref 0–0.9)
KETONES UR STRIP.AUTO-MCNC: 40 MG/DL
LEUKOCYTE ESTERASE UR QL STRIP.AUTO: NEGATIVE
LYMPHOCYTES # BLD AUTO: 0.88 K/UL (ref 1–4.8)
LYMPHOCYTES NFR BLD: 11.2 % (ref 22–41)
MCH RBC QN AUTO: 32.4 PG (ref 27–33)
MCHC RBC AUTO-ENTMCNC: 35.7 G/DL (ref 33.7–35.3)
MCV RBC AUTO: 90.9 FL (ref 81.4–97.8)
MICRO URNS: ABNORMAL
MONOCYTES # BLD AUTO: 0.2 K/UL (ref 0–0.85)
MONOCYTES NFR BLD AUTO: 2.5 % (ref 0–13.4)
MUCOUS THREADS #/AREA URNS HPF: ABNORMAL /HPF
NEUTROPHILS # BLD AUTO: 6.74 K/UL (ref 1.82–7.42)
NEUTROPHILS NFR BLD: 85.4 % (ref 44–72)
NITRITE UR QL STRIP.AUTO: NEGATIVE
NRBC # BLD AUTO: 0 K/UL
NRBC BLD-RTO: 0 /100 WBC
PH UR STRIP.AUTO: 6 [PH] (ref 5–8)
PLATELET # BLD AUTO: 289 K/UL (ref 164–446)
PMV BLD AUTO: 10.3 FL (ref 9–12.9)
POTASSIUM SERPL-SCNC: 3.8 MMOL/L (ref 3.6–5.5)
PROT SERPL-MCNC: 7.7 G/DL (ref 6–8.2)
PROT UR QL STRIP: 100 MG/DL
RBC # BLD AUTO: 4.81 M/UL (ref 4.7–6.1)
RBC # URNS HPF: ABNORMAL /HPF
RBC UR QL AUTO: ABNORMAL
SODIUM SERPL-SCNC: 138 MMOL/L (ref 135–145)
SP GR UR STRIP.AUTO: 1.02
TROPONIN T SERPL-MCNC: 7 NG/L (ref 6–19)
TSH SERPL DL<=0.005 MIU/L-ACNC: 1.17 UIU/ML (ref 0.38–5.33)
WBC # BLD AUTO: 7.9 K/UL (ref 4.8–10.8)
WBC #/AREA URNS HPF: ABNORMAL /HPF

## 2020-11-23 PROCEDURE — A9270 NON-COVERED ITEM OR SERVICE: HCPCS | Performed by: EMERGENCY MEDICINE

## 2020-11-23 PROCEDURE — 84443 ASSAY THYROID STIM HORMONE: CPT

## 2020-11-23 PROCEDURE — 700102 HCHG RX REV CODE 250 W/ 637 OVERRIDE(OP): Performed by: EMERGENCY MEDICINE

## 2020-11-23 PROCEDURE — 36415 COLL VENOUS BLD VENIPUNCTURE: CPT

## 2020-11-23 PROCEDURE — 81001 URINALYSIS AUTO W/SCOPE: CPT

## 2020-11-23 PROCEDURE — 84484 ASSAY OF TROPONIN QUANT: CPT

## 2020-11-23 PROCEDURE — 85025 COMPLETE CBC W/AUTO DIFF WBC: CPT

## 2020-11-23 PROCEDURE — 99284 EMERGENCY DEPT VISIT MOD MDM: CPT

## 2020-11-23 PROCEDURE — 93005 ELECTROCARDIOGRAM TRACING: CPT | Performed by: EMERGENCY MEDICINE

## 2020-11-23 PROCEDURE — 80053 COMPREHEN METABOLIC PANEL: CPT

## 2020-11-23 RX ORDER — DIAZEPAM 2 MG/1
2 TABLET ORAL ONCE
Status: COMPLETED | OUTPATIENT
Start: 2020-11-23 | End: 2020-11-23

## 2020-11-23 RX ORDER — DIAZEPAM 5 MG/1
10 TABLET ORAL ONCE
Status: COMPLETED | OUTPATIENT
Start: 2020-11-23 | End: 2020-11-23

## 2020-11-23 RX ADMIN — DIAZEPAM 2 MG: 2 TABLET ORAL at 01:03

## 2020-11-23 RX ADMIN — DIAZEPAM 10 MG: 5 TABLET ORAL at 11:22

## 2020-11-23 RX ADMIN — METOPROLOL TARTRATE 25 MG: 25 TABLET, FILM COATED ORAL at 01:03

## 2020-11-23 ASSESSMENT — FIBROSIS 4 INDEX: FIB4 SCORE: 5.23

## 2020-11-23 NOTE — ED NOTES
ERP at bedside. Pt agrees with plan of care discussed by ERP. AIDET acknowledged with patient. Medicinal interventions carried out per ERP orders.  Pt sitting up drinking water.  Gurney in low position, side rail up for pt safety. Call light within reach. Will continue to monitor.

## 2020-11-23 NOTE — ED NOTES
Pt discharged home per provider in stable condition. Paperwork provided to pt via RN and discharge instructions went over with by RN - pt verbalized understanding of teaching with no questions or concerns and is A/Ox4, VSS. Paperwork in hand on discharge.    Pt given paperwork and went over with by this RN. Pt in stable condition and VSS. [facility] contacted and report was given to RN. Pt is ambulatory to waiting room where pt is to wait for taxi to [facility]. All belongings are in patient possession on discharge. Paperwork in hand. No questions or concerns.

## 2020-11-23 NOTE — ED TRIAGE NOTES
Chief Complaint   Patient presents with   • Medical Clearance     pt BIB [facility] from [facility] for medical clearance. pt was seen here last night for the same symptom which is pain to left arm. pt went home then [facility] this morning (0800) after being discharged from ER.      /91   Pulse (!) 109   Temp 36.6 °C (97.8 °F) (Temporal)   Resp 16   SpO2 98%

## 2020-11-23 NOTE — ED NOTES
Pt accepted by [facility]. Cab voucher provided. Discharge instructions provided.  Pt verbalized the understanding of discharge instructions to follow up with PCP and to return to ER if condition worsens.  Pt ambulated out of ER without difficulty.

## 2020-11-23 NOTE — ED NOTES
[facility] contacted to see if patient was in the clear to be discharged back to facility - pt is good to go back and has a bed available. Pt is calm and cooperative. VSS. Cleared medically per MD.

## 2020-11-23 NOTE — ED PROVIDER NOTES
CHIEF COMPLAINT  Chief Complaint   Patient presents with   • Medical Clearance     pt ISAIAH Remsa from [facility] for medical clearance. pt was seen here last night for the same symptom which is pain to left arm. pt went home then [facility] this morning (0800) after being discharged from ER.        HPI  Maxime Fernandez is a 35 y.o. male who presents for medical clearance.  The patient was at [facility] going through an intake exam for alcohol withdrawals.  He was seen here this morning and medically cleared for treatment.  He had an EKG and troponin at that time that was negative.  He is having some pain in his left arm that he states went into his left chest and therefore they sent him back here for further medical clearance.  He does not have any risk factors from a cardiac standpoint.  Does not have any risk factors from a DVT standpoint.  At this time he states does not feel well and feels anxious from the withdrawals.  He has not received any medication from [facility] for the withdrawal as he was going through the intake exam.    REVIEW OF SYSTEMS  See HPI for further details. All other systems are negative.     PAST MEDICAL HISTORY  Past Medical History:   Diagnosis Date   • Anxiety        FAMILY HISTORY  [unfilled]    SOCIAL HISTORY  Social History     Socioeconomic History   • Marital status: Single     Spouse name: Not on file   • Number of children: Not on file   • Years of education: Not on file   • Highest education level: Not on file   Occupational History   • Not on file   Social Needs   • Financial resource strain: Not on file   • Food insecurity     Worry: Not on file     Inability: Not on file   • Transportation needs     Medical: Not on file     Non-medical: Not on file   Tobacco Use   • Smoking status: Never Smoker   • Smokeless tobacco: Never Used   Substance and Sexual Activity   • Alcohol use: Yes     Comment: socially every 3 weeks (5-6 beers or cocktails); used to drink 2-3  beers daily   • Drug use: No   • Sexual activity: Yes     Partners: Female   Lifestyle   • Physical activity     Days per week: Not on file     Minutes per session: Not on file   • Stress: Not on file   Relationships   • Social connections     Talks on phone: Not on file     Gets together: Not on file     Attends Hinduism service: Not on file     Active member of club or organization: Not on file     Attends meetings of clubs or organizations: Not on file     Relationship status: Not on file   • Intimate partner violence     Fear of current or ex partner: Not on file     Emotionally abused: Not on file     Physically abused: Not on file     Forced sexual activity: Not on file   Other Topics Concern   • Not on file   Social History Narrative     for [employer]       SURGICAL HISTORY  No past surgical history on file.    CURRENT MEDICATIONS  Home Medications    **Home medications have not yet been reviewed for this encounter**         ALLERGIES  No Known Allergies    PHYSICAL EXAM  VITAL SIGNS: /84   Pulse (!) 112   Temp 36.6 °C (97.8 °F) (Temporal)   Resp 16   SpO2 95%       Constitutional: Anxious but nontoxic   HENT: Normocephalic, Atraumatic, Bilateral external ears normal, Oropharynx moist, No oral exudates, Nose normal.   Eyes: PERRLA, EOMI, Conjunctiva normal, No discharge.   Neck: Normal range of motion, No tenderness, Supple, No stridor.   Lymphatic: No lymphadenopathy noted.   Cardiovascular: Slightly tachycardic heart rate, Normal rhythm, No murmurs, No rubs, No gallops.   Thorax & Lungs: Normal breath sounds, No respiratory distress, No wheezing, No chest tenderness.   Abdomen: Bowel sounds normal, Soft, No tenderness, No masses, No pulsatile masses.   Skin: Warm, Dry, No erythema, No rash.   Back: No tenderness, No CVA tenderness.   Extremities: Intact distal pulses, No edema, No tenderness, No cyanosis, No clubbing.   Neurologic: Alert & oriented x 3, Normal motor function, Normal  sensory function, No focal deficits noted.   Psychiatric: Affect normal, Judgment normal, Mood normal.     Results for orders placed or performed during the hospital encounter of 20   EKG   Result Value Ref Range    Report       [facility] Emergency [facility].    Test Date:  2020  Pt Name:    REX MCGEE        Department: [facility]  MRN:        [id number]                      [facility]   Gender:     Male                         Technician: [id number]  :        1985                   Requested By:GUANAKITO GONZALEZ  Order #:    [id number]                    Reading MD: GUANAKITO GONZALEZ MD    Measurements  Intervals                                Axis  Rate:       106                          P:          73  LA:         141                          QRS:        77  QRSD:       100                          T:          73  QT:         375  QTc:        498    Interpretive Statements  Twelve-lead EKG shows a sinus tachycardia with a ventricular rate of 106,  normal  intervals, normal QRS, no ST segment elevation or depression, normal T waves.    Overall no ischemic changes  Electronically Signed On 2020 11:24:03 PST by GUANAKITO GONZALEZ MD           COURSE & MEDICAL DECISION MAKING  Pertinent Labs & Imaging studies reviewed. (See chart for details)  This a 35-year-old male who presents to the emergency department for medical clearance.  He was already medically cleared this morning at 2 AM with EKG as well as troponin and laboratory markers.  The patient's age makes it very unlikely that he is having acute ischemic disease.  His heart score is 0 and his EKG does not show any dynamic changes from prior.  I suspect his symptoms are from the withdrawal process and possibly from some gastritis and esophagitis.  I will again medically cleared the patient [facility] of his alcohol withdrawal.  He will receive Valium as well as  some oral fluids as he has not received any medication as he is awaiting [facility].  As for his chest pain the most likely source would be esophagitis versus anxiety.  Clinically I do not appreciate any evidence of an infectious source.    FINAL IMPRESSION  1.  Chest pain  2.  Alcohol withdrawal    Disposition  Patient will be transferred [facility] to [facility] treatment he is medically cleared at this time.      Electronically signed by: Claytno King M.D., 11/23/2020 11:19 AM  ED Provider Note

## 2020-11-23 NOTE — DISCHARGE INSTRUCTIONS
The patient is medically cleared for treatment at [facility] for his alcohol withdrawal.  Of note the patient laboratory analysis as well as EKG this morning at 2 AM all of which were normal and his EKG at this time does not show any dynamic changes

## 2020-11-23 NOTE — ED PROVIDER NOTES
"ED Provider Note    ED Provider Note    Scribed for Gi Pendleton MD by Gi Pendleton M.D.. 11/23/2020, 12:41 AM.    Primary care provider: Pcp Not In Computer  Means of arrival: EMS  History obtained from: Patient  History limited by: None    CHIEF COMPLAINT  Chief Complaint   Patient presents with   • ETOH Withdrawal     Medical clearance to go to rehab center. Came from [facility] due to HTN and tachy       HPI  Maxime Fernandez is a 35 y.o. male who presents to the Emergency Department for evaluation for medical clearance.  The patient this evening has gone to an inpatient psychiatric facility for assistance with his trouble with alcohol abuse and concern for alcohol withdrawal.  Patient was sent to our facility because he was found to be hypertensive and tachycardic.  Last drink about 6 hours ago.  Patient notes he drinks on and off, when he is drinking he will drink upwards of 14 \"shots\" of hard liquor.  Denies any recreational drug use or smoking.  Patient is a family history of hypertension and notes he has had multiple elevated blood pressure readings in the past but has never been started on medications for his blood pressure.  He has history of anxiety and is not currently on any medications for this.  No chest pain, no dyspnea, patient does appear to be mildly tachycardic and tremulous consistent with early alcohol withdrawals, no diaphoresis and does not appear severely anxious.    REVIEW OF SYSTEMS  Pertinent positives include alcohol abuse. Pertinent negatives include no chest pain, no dyspnea.  All other systems reviewed and negative.    PAST MEDICAL HISTORY   has a past medical history of Anxiety.    SURGICAL HISTORY  patient denies any surgical history    SOCIAL HISTORY  Social History     Tobacco Use   • Smoking status: Never Smoker   • Smokeless tobacco: Never Used   Substance Use Topics   • Alcohol use: Yes     Comment: socially every 3 weeks (5-6 beers or cocktails); used to " "drink 2-3 beers daily   • Drug use: No      Social History     Substance and Sexual Activity   Drug Use No       FAMILY HISTORY  Family History   Problem Relation Age of Onset   • Thyroid Mother    • Hyperlipidemia Father    • No Known Problems Sister    • No Known Problems Brother        CURRENT MEDICATIONS  Home Medications     Reviewed by Abel Alvarado R.N. (Registered Nurse) on 11/23/20 at 0022  Med List Status: Partial   Medication Last Dose Status   escitalopram (LEXAPRO) 10 MG Tab  Active   folic acid (FOLVITE) 1 MG Tab  Active   hydrOXYzine pamoate (VISTARIL) 50 MG Cap  Active   multivitamin (THERAGRAN) Tab  Active   thiamine (THIAMINE) 100 MG tablet  Active   zolpidem (AMBIEN) 10 MG Tab  Active                ALLERGIES  No Known Allergies    PHYSICAL EXAM  VITAL SIGNS: /83   Pulse 95   Temp 37.1 °C (98.7 °F) (Tympanic)   Resp (!) 21   Ht 1.803 m (5' 11\")   Wt 83.9 kg (185 lb)   SpO2 90%   BMI 25.80 kg/m²     General: Alert, no acute distress  Skin: Warm, dry, normal for ethnicity  Head: Normocephalic, atraumatic  Neck: Trachea midline, no tenderness  Eye: PERRL, normal conjunctiva  Cardiovascular: S1, S2, mildly tachycardic, otherwise regular rate and rhythm, No murmur, Normal peripheral perfusion.  No peripheral edema.  Respiratory: Lungs CTA, respirations are non-labored, breath sounds are equal  Musculoskeletal: No swelling, no deformity  Neurological: Alert and oriented to person, place, time, and situation  Lymphatics: No lymphadenopathy  Psychiatric: Poorly cooperative, appropriate mood & affect      DIAGNOSTIC STUDIES/PROCEDURES    LABS  Results for orders placed or performed during the hospital encounter of 11/23/20   CBC w/ Differential   Result Value Ref Range    WBC 7.9 4.8 - 10.8 K/uL    RBC 4.81 4.70 - 6.10 M/uL    Hemoglobin 15.6 14.0 - 18.0 g/dL    Hematocrit 43.7 42.0 - 52.0 %    MCV 90.9 81.4 - 97.8 fL    MCH 32.4 27.0 - 33.0 pg    MCHC 35.7 (H) 33.7 - 35.3 g/dL    RDW 42.7 " 35.9 - 50.0 fL    Platelet Count 289 164 - 446 K/uL    MPV 10.3 9.0 - 12.9 fL    Neutrophils-Polys 85.40 (H) 44.00 - 72.00 %    Lymphocytes 11.20 (L) 22.00 - 41.00 %    Monocytes 2.50 0.00 - 13.40 %    Eosinophils 0.10 0.00 - 6.90 %    Basophils 0.50 0.00 - 1.80 %    Immature Granulocytes 0.30 0.00 - 0.90 %    Nucleated RBC 0.00 /100 WBC    Neutrophils (Absolute) 6.74 1.82 - 7.42 K/uL    Lymphs (Absolute) 0.88 (L) 1.00 - 4.80 K/uL    Monos (Absolute) 0.20 0.00 - 0.85 K/uL    Eos (Absolute) 0.01 0.00 - 0.51 K/uL    Baso (Absolute) 0.04 0.00 - 0.12 K/uL    Immature Granulocytes (abs) 0.02 0.00 - 0.11 K/uL    NRBC (Absolute) 0.00 K/uL   Complete Metabolic Panel (CMP)   Result Value Ref Range    Sodium 138 135 - 145 mmol/L    Potassium 3.8 3.6 - 5.5 mmol/L    Chloride 93 (L) 96 - 112 mmol/L    Co2 17 (L) 20 - 33 mmol/L    Anion Gap 28.0 (H) 7.0 - 16.0    Glucose 81 65 - 99 mg/dL    Bun 18 8 - 22 mg/dL    Creatinine 0.87 0.50 - 1.40 mg/dL    Calcium 8.5 8.4 - 10.2 mg/dL    AST(SGOT) 70 (H) 12 - 45 U/L    ALT(SGPT) 105 (H) 2 - 50 U/L    Alkaline Phosphatase 90 30 - 99 U/L    Total Bilirubin 0.7 0.1 - 1.5 mg/dL    Albumin 4.6 3.2 - 4.9 g/dL    Total Protein 7.7 6.0 - 8.2 g/dL    Globulin 3.1 1.9 - 3.5 g/dL    A-G Ratio 1.5 g/dL   Troponin STAT   Result Value Ref Range    Troponin T 7 6 - 19 ng/L   TSH (for screening thyroid dysfunction)   Result Value Ref Range    TSH 1.170 0.380 - 5.330 uIU/mL   URINALYSIS    Specimen: Urine, Clean Catch   Result Value Ref Range    Color Yellow     Character Clear     Specific Gravity 1.020 <1.035    Ph 6.0 5.0 - 8.0    Glucose Negative Negative mg/dL    Ketones 40 (A) Negative mg/dL    Protein 100 (A) Negative mg/dL    Bilirubin Negative Negative    Nitrite Negative Negative    Leukocyte Esterase Negative Negative    Occult Blood Small (A) Negative    Micro Urine Req Microscopic    URINE MICROSCOPIC (W/UA)   Result Value Ref Range    WBC Rare (A) /hpf    RBC 2-5 (A) /hpf    Bacteria Rare  "(A) None /hpf    Epithelial Cells Negative Few /hpf    Mucous Threads Rare /hpf   ESTIMATED GFR   Result Value Ref Range    GFR If African American >60 >60 mL/min/1.73 m 2    GFR If Non African American >60 >60 mL/min/1.73 m 2     All labs reviewed by me, abnormal transaminases consistent with alcohol abuse, no evidence of hepatic insufficiency.  Elevated anion gap consistent with alcoholic ketosis..    EKG  12 Lead EKG obtained at 0051 and interpreted by me to show:  Rhythm: Sinus tachycardia  Rate: 124  Axis: Normal  Intervals: Normal  Q Waves: Normal  No diagnostic ST segment elevation    Clinical Impression: Normal EKG  Compared to none available        COURSE & MEDICAL DECISION MAKING  Pertinent Labs & Imaging studies reviewed. (See chart for details)    12:41 AM - Patient seen and examined at bedside. Patient will be treated with metoprolol and Valium. Ordered cardiac work-up to evaluate his symptoms. The differential diagnoses include but are not limited to: Essential hypertension, alcohol abuse, alcohol withdrawal, electrolyte normality    0147: Blood pressure is much improved, is also no longer tachycardic.  Patient is relaxed and nontoxic.  Labs are consistent with mild alcoholic ketosis but given he is able to tolerate p.o. there is no indication for inpatient medical management.  EKG is unremarkable, troponin unremarkable, renal function is normal, no evidence of hypertensive emergency.    Patient Vitals for the past 24 hrs:   BP Temp Temp src Pulse Resp SpO2 Height Weight   11/23/20 0148 134/83 -- -- 95 (!) 21 90 % -- --   11/23/20 0133 135/91 -- -- 96 19 91 % -- --   11/23/20 0118 147/93 -- -- (!) 108 20 92 % -- --   11/23/20 0021 151/101 37.1 °C (98.7 °F) Tympanic (!) 123 19 93 % -- --   11/23/20 0019 -- -- -- -- -- -- 1.803 m (5' 11\") 83.9 kg (185 lb)   11/23/20 0016 151/101 -- -- (!) 126 -- 93 % -- --     HTN/IDDM FOLLOW UP:  The patient is referred to a primary physician for blood pressure " management, diabetic screening, and for all other preventive health concerns    Decision Making:  This is a 35 y.o. year old male who presents for evaluation for medical clearance.  He is hypertensive and tachycardic which is consistent with alcohol withdrawal.  However after Valium he is still hypertensive and notes multiple previous high blood pressure readings.  At this point I do think it is reasonable to start the patient on a low-dose of antihypertensive.  Alcohol withdrawals were managed by the psychiatric facility.  There is no evidence of endorgan damage/hypertensive emergency, no indication for inpatient management.    The patient will return for new or worsening symptoms and is stable at the time of discharge.    DISPOSITION:  Patient will be discharged home in stable condition.    FOLLOW UP:  Saray Mcclellan M.D.  [facility]   [address]  [facility] [id number]  [phone]    Schedule an appointment as soon as possible for a visit in 1 week        OUTPATIENT MEDICATIONS:  New Prescriptions    METOPROLOL (LOPRESSOR) 25 MG TAB    Take 1 Tab by mouth 2 times a day.         FINAL IMPRESSION  1. Alcohol withdrawal syndrome without complication (HCC)    2. Essential hypertension    3. Alcoholic ketosis          Gi BOYER M.D. (Jacinda), am scribing for, and in the presence of, Gi Pendleton MD.    Electronically signed by: Gi Pendleton M.D. (Jacinda), 11/23/2020    Gi BOYER MD personally performed the services described in this documentation, as scribed by Gi Pendleton M.D. in my presence, and it is both accurate and complete    The note accurately reflects work and decisions made by me.  Gi Pendleton M.D.  11/23/2020  2:01 AM

## 2020-11-23 NOTE — DISCHARGE PLANNING
Anticipated Discharge Disposition: DC psych Voluntary    Action: Call to Mamta at [facility]. Pt reported Chest pain radiating from thumb to Heart. Evaluated earlier in ER for same. EKG Repeated. MD garcia. Will Transport to [facility] for voluntary intake via Cab slip no [id number]. Mamta aware. Records faxed    Barriers to Discharge: None    Plan: DC [facility]

## 2021-05-12 ENCOUNTER — OFFICE VISIT (OUTPATIENT)
Dept: URGENT CARE | Facility: CLINIC | Age: 36
End: 2021-05-12
Payer: MEDICAID

## 2021-05-12 ENCOUNTER — HOSPITAL ENCOUNTER (OUTPATIENT)
Facility: MEDICAL CENTER | Age: 36
End: 2021-05-12
Attending: PHYSICIAN ASSISTANT
Payer: MEDICAID

## 2021-05-12 VITALS
RESPIRATION RATE: 16 BRPM | BODY MASS INDEX: 27.22 KG/M2 | HEIGHT: 72 IN | WEIGHT: 201 LBS | DIASTOLIC BLOOD PRESSURE: 70 MMHG | TEMPERATURE: 98.1 F | HEART RATE: 117 BPM | SYSTOLIC BLOOD PRESSURE: 120 MMHG | OXYGEN SATURATION: 96 %

## 2021-05-12 DIAGNOSIS — K52.9 GASTROENTERITIS: ICD-10-CM

## 2021-05-12 PROCEDURE — 99214 OFFICE O/P EST MOD 30 MIN: CPT | Performed by: PHYSICIAN ASSISTANT

## 2021-05-12 PROCEDURE — U0005 INFEC AGEN DETEC AMPLI PROBE: HCPCS

## 2021-05-12 PROCEDURE — U0003 INFECTIOUS AGENT DETECTION BY NUCLEIC ACID (DNA OR RNA); SEVERE ACUTE RESPIRATORY SYNDROME CORONAVIRUS 2 (SARS-COV-2) (CORONAVIRUS DISEASE [COVID-19]), AMPLIFIED PROBE TECHNIQUE, MAKING USE OF HIGH THROUGHPUT TECHNOLOGIES AS DESCRIBED BY CMS-2020-01-R: HCPCS

## 2021-05-12 RX ORDER — ONDANSETRON 4 MG/1
4 TABLET, FILM COATED ORAL EVERY 8 HOURS PRN
Qty: 20 TABLET | Refills: 0 | Status: SHIPPED | OUTPATIENT
Start: 2021-05-12 | End: 2022-02-21

## 2021-05-12 ASSESSMENT — FIBROSIS 4 INDEX: FIB4 SCORE: 0.85

## 2021-05-13 DIAGNOSIS — K52.9 GASTROENTERITIS: ICD-10-CM

## 2021-05-13 LAB
COVID ORDER STATUS COVID19: NORMAL
SARS-COV-2 RNA RESP QL NAA+PROBE: NOTDETECTED
SPECIMEN SOURCE: NORMAL

## 2021-05-13 ASSESSMENT — ENCOUNTER SYMPTOMS
SINUS PAIN: 0
CHILLS: 0
WHEEZING: 0
COUGH: 0
SHORTNESS OF BREATH: 0
DIARRHEA: 1
FEVER: 0
HEADACHES: 1
VOMITING: 1
SORE THROAT: 0
MYALGIAS: 1
BLOOD IN STOOL: 0
ARTHRALGIAS: 0
BLOATING: 0
ABDOMINAL PAIN: 1

## 2021-05-13 NOTE — PROGRESS NOTES
Subjective:      Maxime Fernandez is a 35 y.o. male who presents with Diarrhea (naseua, induced vomiting, light headed, body aches. x today. (Work requesting COVID test) )            Diarrhea   This is a new problem. The current episode started today. The problem occurs 5 to 10 times per day. The problem has been unchanged. The stool consistency is described as watery. Associated symptoms include abdominal pain (generalized, intermittent abdominal cramping.), headaches, myalgias and vomiting. Pertinent negatives include no arthralgias, bloating, chills, coughing, fever or URI. Nothing aggravates the symptoms. Risk factors include ill contacts (daughter had similar illness this past week ). He has tried increased fluids for the symptoms. The treatment provided no relief.       Past Medical History:   Diagnosis Date   • Anxiety        No past surgical history on file.    Family History   Problem Relation Age of Onset   • Thyroid Mother    • Hyperlipidemia Father    • No Known Problems Sister    • No Known Problems Brother        No Known Allergies    Medications, Allergies, and current problem list reviewed today in Epic    Review of Systems   Constitutional: Positive for malaise/fatigue. Negative for chills and fever.   HENT: Negative for congestion, ear pain, sinus pain and sore throat.    Respiratory: Negative for cough, shortness of breath and wheezing.    Cardiovascular: Negative for chest pain and leg swelling.   Gastrointestinal: Positive for abdominal pain (generalized, intermittent abdominal cramping.), diarrhea and vomiting. Negative for bloating and blood in stool.   Musculoskeletal: Positive for myalgias. Negative for arthralgias.   Neurological: Positive for headaches.     All other systems reviewed and are negative.        Objective:     /70 (BP Location: Left arm, Patient Position: Sitting, BP Cuff Size: Adult)   Pulse (!) 117   Temp 36.7 °C (98.1 °F) (Temporal)   Resp 16   Ht 1.829 m  (6')   Wt 91.2 kg (201 lb)   SpO2 96%   BMI 27.26 kg/m²      Physical Exam  Constitutional:       General: He is not in acute distress.     Appearance: He is not ill-appearing or diaphoretic.   HENT:      Head: Normocephalic and atraumatic.      Mouth/Throat:      Mouth: Mucous membranes are moist.      Pharynx: No posterior oropharyngeal erythema.   Eyes:      General: No scleral icterus.     Conjunctiva/sclera: Conjunctivae normal.   Cardiovascular:      Rate and Rhythm: Regular rhythm. Tachycardia present.      Heart sounds: Normal heart sounds. No murmur heard.     Pulmonary:      Effort: Pulmonary effort is normal. No respiratory distress.      Breath sounds: Normal breath sounds. No wheezing, rhonchi or rales.   Abdominal:      General: There is no distension.      Palpations: Abdomen is soft. There is no mass.      Tenderness: There is no abdominal tenderness. There is no right CVA tenderness, left CVA tenderness, guarding or rebound.   Skin:     General: Skin is warm and dry.   Neurological:      General: No focal deficit present.      Mental Status: He is alert and oriented to person, place, and time.   Psychiatric:         Mood and Affect: Mood normal.         Behavior: Behavior normal.         Thought Content: Thought content normal.         Judgment: Judgment normal.                        Assessment/Plan:        1. Gastroenteritis    - ondansetron (ZOFRAN) 4 MG Tab tablet; Take 1 tablet by mouth every 8 hours as needed for Nausea/Vomiting.  Dispense: 20 tablet; Refill: 0  - SARS-CoV-2 PCR (24 hour In-House): Collect NP swab in VTM; Future    COVID test pending.  Isolation guidelines, conservative measures and ER precautions discussed.   COVID handout given.    Differential diagnoses, Supportive care, and indications for immediate follow-up discussed with patient.   Pathogenesis of diagnosis discussed including typical length and natural progression.   Instructed to return to clinic or nearest  emergency department for any change in condition, further concerns, or worsening of symptoms.    The patient demonstrated a good understanding and agreed with the treatment plan.    Enriqueta Khan P.A.-C.

## 2021-10-01 ENCOUNTER — HOSPITAL ENCOUNTER (EMERGENCY)
Facility: MEDICAL CENTER | Age: 36
End: 2021-10-01
Attending: EMERGENCY MEDICINE
Payer: MEDICAID

## 2021-10-01 ENCOUNTER — APPOINTMENT (OUTPATIENT)
Dept: RADIOLOGY | Facility: MEDICAL CENTER | Age: 36
End: 2021-10-01
Attending: EMERGENCY MEDICINE
Payer: MEDICAID

## 2021-10-01 VITALS
WEIGHT: 211.86 LBS | HEART RATE: 79 BPM | SYSTOLIC BLOOD PRESSURE: 134 MMHG | OXYGEN SATURATION: 98 % | BODY MASS INDEX: 28.7 KG/M2 | DIASTOLIC BLOOD PRESSURE: 70 MMHG | TEMPERATURE: 97.6 F | RESPIRATION RATE: 18 BRPM | HEIGHT: 72 IN

## 2021-10-01 DIAGNOSIS — R11.0 NAUSEA: ICD-10-CM

## 2021-10-01 DIAGNOSIS — R42 LIGHTHEADEDNESS: ICD-10-CM

## 2021-10-01 LAB
ALBUMIN SERPL BCP-MCNC: 5 G/DL (ref 3.2–4.9)
ALBUMIN/GLOB SERPL: 1.6 G/DL
ALP SERPL-CCNC: 72 U/L (ref 30–99)
ALT SERPL-CCNC: 27 U/L (ref 2–50)
ANION GAP SERPL CALC-SCNC: 13 MMOL/L (ref 7–16)
AST SERPL-CCNC: 22 U/L (ref 12–45)
BASOPHILS # BLD AUTO: 0.4 % (ref 0–1.8)
BASOPHILS # BLD: 0.03 K/UL (ref 0–0.12)
BILIRUB SERPL-MCNC: 0.6 MG/DL (ref 0.1–1.5)
BUN SERPL-MCNC: 15 MG/DL (ref 8–22)
CALCIUM SERPL-MCNC: 9.8 MG/DL (ref 8.4–10.2)
CHLORIDE SERPL-SCNC: 100 MMOL/L (ref 96–112)
CO2 SERPL-SCNC: 25 MMOL/L (ref 20–33)
CREAT SERPL-MCNC: 1.07 MG/DL (ref 0.5–1.4)
EKG IMPRESSION: NORMAL
EOSINOPHIL # BLD AUTO: 0.04 K/UL (ref 0–0.51)
EOSINOPHIL NFR BLD: 0.5 % (ref 0–6.9)
ERYTHROCYTE [DISTWIDTH] IN BLOOD BY AUTOMATED COUNT: 40.9 FL (ref 35.9–50)
GLOBULIN SER CALC-MCNC: 3.1 G/DL (ref 1.9–3.5)
GLUCOSE SERPL-MCNC: 110 MG/DL (ref 65–99)
HCT VFR BLD AUTO: 47.3 % (ref 42–52)
HGB BLD-MCNC: 16.3 G/DL (ref 14–18)
IMM GRANULOCYTES # BLD AUTO: 0.02 K/UL (ref 0–0.11)
IMM GRANULOCYTES NFR BLD AUTO: 0.3 % (ref 0–0.9)
LYMPHOCYTES # BLD AUTO: 1.74 K/UL (ref 1–4.8)
LYMPHOCYTES NFR BLD: 22.8 % (ref 22–41)
MCH RBC QN AUTO: 30.2 PG (ref 27–33)
MCHC RBC AUTO-ENTMCNC: 34.5 G/DL (ref 33.7–35.3)
MCV RBC AUTO: 87.8 FL (ref 81.4–97.8)
MONOCYTES # BLD AUTO: 0.49 K/UL (ref 0–0.85)
MONOCYTES NFR BLD AUTO: 6.4 % (ref 0–13.4)
NEUTROPHILS # BLD AUTO: 5.3 K/UL (ref 1.82–7.42)
NEUTROPHILS NFR BLD: 69.6 % (ref 44–72)
NRBC # BLD AUTO: 0 K/UL
NRBC BLD-RTO: 0 /100 WBC
PLATELET # BLD AUTO: 199 K/UL (ref 164–446)
PMV BLD AUTO: 11.3 FL (ref 9–12.9)
POTASSIUM SERPL-SCNC: 3.8 MMOL/L (ref 3.6–5.5)
PROT SERPL-MCNC: 8.1 G/DL (ref 6–8.2)
RBC # BLD AUTO: 5.39 M/UL (ref 4.7–6.1)
SODIUM SERPL-SCNC: 138 MMOL/L (ref 135–145)
TROPONIN T SERPL-MCNC: <6 NG/L (ref 6–19)
WBC # BLD AUTO: 7.6 K/UL (ref 4.8–10.8)

## 2021-10-01 PROCEDURE — A9270 NON-COVERED ITEM OR SERVICE: HCPCS | Performed by: EMERGENCY MEDICINE

## 2021-10-01 PROCEDURE — 85025 COMPLETE CBC W/AUTO DIFF WBC: CPT

## 2021-10-01 PROCEDURE — 80053 COMPREHEN METABOLIC PANEL: CPT

## 2021-10-01 PROCEDURE — 700111 HCHG RX REV CODE 636 W/ 250 OVERRIDE (IP): Performed by: EMERGENCY MEDICINE

## 2021-10-01 PROCEDURE — 96374 THER/PROPH/DIAG INJ IV PUSH: CPT

## 2021-10-01 PROCEDURE — 700105 HCHG RX REV CODE 258: Performed by: EMERGENCY MEDICINE

## 2021-10-01 PROCEDURE — 71045 X-RAY EXAM CHEST 1 VIEW: CPT

## 2021-10-01 PROCEDURE — 700102 HCHG RX REV CODE 250 W/ 637 OVERRIDE(OP): Performed by: EMERGENCY MEDICINE

## 2021-10-01 PROCEDURE — 93005 ELECTROCARDIOGRAM TRACING: CPT

## 2021-10-01 PROCEDURE — 84484 ASSAY OF TROPONIN QUANT: CPT

## 2021-10-01 PROCEDURE — 93005 ELECTROCARDIOGRAM TRACING: CPT | Performed by: EMERGENCY MEDICINE

## 2021-10-01 PROCEDURE — 99284 EMERGENCY DEPT VISIT MOD MDM: CPT

## 2021-10-01 RX ORDER — SODIUM CHLORIDE 9 MG/ML
1000 INJECTION, SOLUTION INTRAVENOUS ONCE
Status: COMPLETED | OUTPATIENT
Start: 2021-10-01 | End: 2021-10-01

## 2021-10-01 RX ORDER — ONDANSETRON 2 MG/ML
4 INJECTION INTRAMUSCULAR; INTRAVENOUS ONCE
Status: COMPLETED | OUTPATIENT
Start: 2021-10-01 | End: 2021-10-01

## 2021-10-01 RX ORDER — HYDROXYZINE HYDROCHLORIDE 25 MG/1
25 TABLET, FILM COATED ORAL ONCE
Status: COMPLETED | OUTPATIENT
Start: 2021-10-01 | End: 2021-10-01

## 2021-10-01 RX ADMIN — SODIUM CHLORIDE 1000 ML: 9 INJECTION, SOLUTION INTRAVENOUS at 19:29

## 2021-10-01 RX ADMIN — ONDANSETRON HYDROCHLORIDE 4 MG: 2 SOLUTION INTRAMUSCULAR; INTRAVENOUS at 19:29

## 2021-10-01 RX ADMIN — HYDROXYZINE HYDROCHLORIDE 25 MG: 25 TABLET, FILM COATED ORAL at 19:28

## 2021-10-01 ASSESSMENT — FIBROSIS 4 INDEX: FIB4 SCORE: 0.85

## 2021-10-01 NOTE — Clinical Note
Maxime Fernandez was seen and treated in our emergency department on 10/1/2021.  He may return to work on 10/04/2021.       If you have any questions or concerns, please don't hesitate to call.      Yvon Alvarado M.D.

## 2021-10-02 NOTE — ED TRIAGE NOTES
36 yo male presents to triage with reports of fatigues, dizziness and lightheadedness since this AM after his shower.  Patient report he has felt nauseated and dizzy throughout the day.  Reports shakiness throughout the day         Patient is not Covid vaccinated

## 2021-10-02 NOTE — ED PROVIDER NOTES
ED Provider Note    CHIEF COMPLAINT  Chief Complaint   Patient presents with   • Dizziness   • Lightheadedness       HPI  Maxime Fernandez is a 35 y.o. male who presents for evaluation of dizziness and lightheadedness.  Patient notes his symptoms started after shower this morning.  He states that they continued at work and while he does not have a vertiginous feeling, his lightheadedness and nausea have caused him some anxiety.  He notes that he has been very busy and is also closing on a house which is very stressful.  He notes that he has been eating and feels like he has been drinking fluid but also has a very dry mouth and feels he could be dehydrated.  He has had no chest pain, vomiting, diarrhea, hematuria, dysuria, or shortness of breath.  He has had no sick contacts, and has not had any fevers or chills.    REVIEW OF SYSTEMS  Constitutional: No fevers or chills  Skin: No rashes  HEENT: No sore throat, runny nose, sores, trouble swallowing, trouble speaking.  Neck: No neck pain  Chest: No pain   Pulm: No shortness of breath, cough, wheezing, stridor, or pain with inspiration/expiration  Gastrointestinal: No vomiting, diarrhea, constipation, bloating, melena, hematochezia or abdominal pain.  Genitourinary: No dysuria or hematuria  Musculoskeletal: No pain, swelling, weakness  Neurologic: No sensory or motor changes. No confusion or disorientation.  Heme: No bleeding or bruising problems.   Immuno: No hx of recurrent infections      PAST MEDICAL HISTORY   has a past medical history of Anxiety and Psychiatric disorder.    SOCIAL HISTORY  Social History     Tobacco Use   • Smoking status: Never Smoker   • Smokeless tobacco: Never Used   Vaping Use   • Vaping Use: Former   Substance and Sexual Activity   • Alcohol use: Yes     Comment: socially every 3 weeks (5-6 beers or cocktails); used to drink 2-3 beers daily   • Drug use: No   • Sexual activity: Yes     Partners: Female       SURGICAL HISTORY  patient  denies any surgical history    CURRENT MEDICATIONS  Home Medications     Reviewed by Arabella Garnica R.N. (Registered Nurse) on 10/01/21 at 1716  Med List Status: Not Addressed   Medication Last Dose Status   hydrOXYzine pamoate (VISTARIL) 50 MG Cap  Active   ondansetron (ZOFRAN) 4 MG Tab tablet  Active                ALLERGIES  No Known Allergies    PHYSICAL EXAM  VITAL SIGNS: /82   Pulse 82   Temp 36.4 °C (97.6 °F) (Temporal)   Resp 20   Ht 1.829 m (6')   Wt 96.1 kg (211 lb 13.8 oz)   SpO2 97%   BMI 28.73 kg/m²    Gen: Alert in no apparent distress.  HEENT: No signs of trauma, Bilateral external ears normal, Nose normal. Conjunctiva normal, Non-icteric.   Neck:  No tenderness, Supple, No masses  Lymphatic: No cervical lymphadenopathy noted.   Cardiovascular: Regular rate and rhythm, no murmurs.  Capillary refill less than 3 seconds to all extremities, 2+ distal pulses.  Thorax & Lungs: Normal breath sounds, No respiratory distress, No wheezing bilateral chest rise  Abdomen: Bowel sounds normal, Soft, No tenderness, No masses, No pulsatile masses. No Guarding or rebound  Skin: Warm, Dry  Extremities: Intact distal pulses, No edema  Neurologic: Alert , no facial droop, grossly normal coordination and strength  Psychiatric: Affect pleasant      LABS  Results for orders placed or performed during the hospital encounter of 10/01/21   Complete Metabolic Panel (CMP)   Result Value Ref Range    Sodium 138 135 - 145 mmol/L    Potassium 3.8 3.6 - 5.5 mmol/L    Chloride 100 96 - 112 mmol/L    Co2 25 20 - 33 mmol/L    Anion Gap 13.0 7.0 - 16.0    Glucose 110 (H) 65 - 99 mg/dL    Bun 15 8 - 22 mg/dL    Creatinine 1.07 0.50 - 1.40 mg/dL    Calcium 9.8 8.4 - 10.2 mg/dL    AST(SGOT) 22 12 - 45 U/L    ALT(SGPT) 27 2 - 50 U/L    Alkaline Phosphatase 72 30 - 99 U/L    Total Bilirubin 0.6 0.1 - 1.5 mg/dL    Albumin 5.0 (H) 3.2 - 4.9 g/dL    Total Protein 8.1 6.0 - 8.2 g/dL    Globulin 3.1 1.9 - 3.5 g/dL    A-G Ratio 1.6  g/dL   Troponin   Result Value Ref Range    Troponin T <6 6 - 19 ng/L   ESTIMATED GFR   Result Value Ref Range    GFR If African American >60 >60 mL/min/1.73 m 2    GFR If Non African American >60 >60 mL/min/1.73 m 2   CBC WITH DIFFERENTIAL   Result Value Ref Range    WBC 7.6 4.8 - 10.8 K/uL    RBC 5.39 4.70 - 6.10 M/uL    Hemoglobin 16.3 14.0 - 18.0 g/dL    Hematocrit 47.3 42.0 - 52.0 %    MCV 87.8 81.4 - 97.8 fL    MCH 30.2 27.0 - 33.0 pg    MCHC 34.5 33.7 - 35.3 g/dL    RDW 40.9 35.9 - 50.0 fL    Platelet Count 199 164 - 446 K/uL    MPV 11.3 9.0 - 12.9 fL    Neutrophils-Polys 69.60 44.00 - 72.00 %    Lymphocytes 22.80 22.00 - 41.00 %    Monocytes 6.40 0.00 - 13.40 %    Eosinophils 0.50 0.00 - 6.90 %    Basophils 0.40 0.00 - 1.80 %    Immature Granulocytes 0.30 0.00 - 0.90 %    Nucleated RBC 0.00 /100 WBC    Neutrophils (Absolute) 5.30 1.82 - 7.42 K/uL    Lymphs (Absolute) 1.74 1.00 - 4.80 K/uL    Monos (Absolute) 0.49 0.00 - 0.85 K/uL    Eos (Absolute) 0.04 0.00 - 0.51 K/uL    Baso (Absolute) 0.03 0.00 - 0.12 K/uL    Immature Granulocytes (abs) 0.02 0.00 - 0.11 K/uL    NRBC (Absolute) 0.00 K/uL   EKG   Result Value Ref Range    Report       [facility]    Test Date:  2021-10-01  Pt Name:    REX MCGEE        Department: [facility]  MRN:        [id number]                      Room:  Gender:     Male                         Technician: LEWIS  :        1985                   Requested By:ER TRIAGE PROTOCOL  Order #:    [id number]                    Reading MD:    Measurements  Intervals                                Axis  Rate:       69                           P:          62  CA:         133                          QRS:        81  QRSD:       102                          T:          60  QT:         372  QTc:        399    Interpretive Statements  Sinus rhythm  Compared to ECG 2020 11:03:01  Sinus tachycardia no longer present  ST (T wave) deviation no longer  present  Possible ischemia no longer present         RADIOLOGY  DX-CHEST-PORTABLE (1 VIEW)   Final Result      1.  There is no acute cardiopulmonary process.          HYDRATION: Based on the patient's presentation of Dehydration the patient was given IV fluids. IV Hydration was used because oral hydration was not adequate alone. Upon recheck following hydration, the patient was improving.    COURSE & MEDICAL DECISION MAKING  Patient arrives for evaluation of somewhat vague symptoms of lightheadedness and nausea which have been present since this morning.  They have been persistent and causing him some anxiety.  There may be a component of organic anxiety and situational stress also.  He states understanding the need for screening evaluation to determine if there is any endorgan dysfunction or correctable causes of his lightheadedness.  I will presumptively give him IV fluids as he is nauseous and has been demonstrating signs of there could be an orthostatic component.    Patient was given hydroxyzine orally which seems to have helped his anxiety objectively.  He states understanding that the findings are reassuring and did not demonstrate any emergent issues.  I feel he is safe for discharge with symptomatic treatment at home and outpatient follow-up but he states clear understanding of return instructions.  He will be given a work note so that he can have tomorrow off to monitor his symptoms,  hydrate, and relax.    FINAL IMPRESSION  1. Lightheadedness    2. Nausea        Electronically signed by: Yvon Alvarado M.D., 10/1/2021 7:00 PM

## 2021-10-24 ENCOUNTER — HOSPITAL ENCOUNTER (EMERGENCY)
Facility: MEDICAL CENTER | Age: 36
End: 2021-10-24
Attending: EMERGENCY MEDICINE
Payer: MEDICAID

## 2021-10-24 VITALS
BODY MASS INDEX: 28.58 KG/M2 | DIASTOLIC BLOOD PRESSURE: 94 MMHG | HEART RATE: 83 BPM | TEMPERATURE: 97.5 F | WEIGHT: 210.98 LBS | OXYGEN SATURATION: 98 % | SYSTOLIC BLOOD PRESSURE: 140 MMHG | HEIGHT: 72 IN | RESPIRATION RATE: 16 BRPM

## 2021-10-24 DIAGNOSIS — S61.210A LACERATION OF RIGHT INDEX FINGER WITHOUT FOREIGN BODY WITHOUT DAMAGE TO NAIL, INITIAL ENCOUNTER: ICD-10-CM

## 2021-10-24 PROCEDURE — 304999 HCHG REPAIR-SIMPLE/INTERMED LEVEL 1

## 2021-10-24 PROCEDURE — 303747 HCHG EXTRA SUTURE

## 2021-10-24 PROCEDURE — 99282 EMERGENCY DEPT VISIT SF MDM: CPT

## 2021-10-24 RX ORDER — LIDOCAINE HYDROCHLORIDE 10 MG/ML
20 INJECTION, SOLUTION INFILTRATION; PERINEURAL ONCE
Status: DISCONTINUED | OUTPATIENT
Start: 2021-10-24 | End: 2021-10-24 | Stop reason: HOSPADM

## 2021-10-24 ASSESSMENT — FIBROSIS 4 INDEX: FIB4 SCORE: 0.77

## 2021-10-24 NOTE — ED PROVIDER NOTES
"ED Provider Note     Scribed for Priya Hansen D.O. by Abdullahi Zee. 10/24/2021, 2:35 AM.     Primary care provider: Pcp Not In Computer  Means of arrival: Walk-in         History obtained from: Patient  History limited by: None    CHIEF COMPLAINT  Chief Complaint   Patient presents with   • Laceration     Right First Digit, \"prepping food for tomorrow\"     HPI  Maxime Fernandez is a 35 y.o. male who presents to the emergency Department for a right first digit laceration onset 12 AM today. Per patient, he sliced his right thumb with a \"really really sharp knife\" while he was prepping food. Denies right hand pain. No alleviating or exacerbating factors reported.    REVIEW OF SYSTEMS  Pertinent positives include right first digit laceration. Pertinent negatives include no right hand pain.   See HPI for further details. All other systems are negative.    PAST MEDICAL HISTORY  Past Medical History:   Diagnosis Date   • Anxiety    • Psychiatric disorder     anxiety        FAMILY HISTORY  Family History   Problem Relation Age of Onset   • Thyroid Mother    • Hyperlipidemia Father    • No Known Problems Sister    • No Known Problems Brother      SOCIAL HISTORY  Social History     Tobacco Use   • Smoking status: Never Smoker   • Smokeless tobacco: Never Used   Vaping Use   • Vaping Use: Former   Substance Use Topics   • Alcohol use: Yes   • Drug use: No      Social History     Substance and Sexual Activity   Drug Use No       SURGICAL HISTORY  History reviewed. No pertinent surgical history.    CURRENT MEDICATIONS    Current Facility-Administered Medications:   •  lidocaine (XYLOCAINE) 1%  injection, 20 mL, Infiltration, Once, Priya Hansen D.O.    Current Outpatient Medications:   •  ondansetron (ZOFRAN) 4 MG Tab tablet, Take 1 tablet by mouth every 8 hours as needed for Nausea/Vomiting., Disp: 20 tablet, Rfl: 0  •  hydrOXYzine pamoate (VISTARIL) 50 MG Cap, Take 50 mg by mouth every 8 hours as needed., Disp: , Rfl: "     ALLERGIES  No Known Allergies    PHYSICAL EXAM  VITAL SIGNS: /83   Pulse 89   Temp 36.4 °C (97.5 °F) (Temporal)   Resp 15   Ht 1.829 m (6')   Wt 95.7 kg (210 lb 15.7 oz)   SpO2 96%   BMI 28.61 kg/m²     Constitutional: Patient is well developed, well nourished. Non-toxic appearing. No acute distress.   HENT: Normocephalic, atraumatic.  Cardiovascular: Normal heart rate and Regular rhythm. No murmur  Thorax & Lungs: Clear and equal breath sounds with good excursion. No respiratory distress  Abdomen: Bowel sounds normal in all four quadrants. Soft,nontender  Skin: Warm, Dry  Extremities: 1 cm flap laceration to the right thumb distal phalanx into the dermis, no foreign bodies, neurovascularly intact, no other injuries.  Musculoskeletal: Normal range of motion in all major joints. No tenderness to palpation or major deformities noted.   Neurologic: Alert & oriented x 3, Normal motor function, Normal sensory function  Psychiatric: Affect normal, Judgment normal, Mood normal.     DIAGNOSTICS/PROCEDURES    Laceration Repair Procedure Note    Indication: Laceration    Procedure: The patient was placed in the appropriate position and anesthesia around the laceration was obtained by infiltration using 1% Lidocaine without epinephrine. The area was then cleansed with Betadine solution.. The laceration was closed with 5-0 Nylon using interrupted sutures. There were no additional lacerations requiring repair. The wound area was then dressed with     Total repaired wound length: 1 cm.     Other Items: Suture count: 3    The patient tolerated the procedure well.    Complications: None    COURSE & MEDICAL DECISION MAKING  Pertinent Labs & Imaging studies reviewed. (See chart for details)    2:35 AM - Patient seen and evaluated at bedside. Laceration Repair performed by me, as outlined above. Discussed plans and precautions for discharge. Patient understands and verbalizes agreement to the plan of  discharge.  Patient's wound was sutured and he tolerated it well.  He is to follow-up for suture removal in 7 to 10 days.      The patient will return for new or worsening symptoms and is stable at the time of discharge.    DISPOSITION:  Patient will be discharged home in stable condition.    FOLLOW UP:  [facility] [address]  [address], [address]  [place] [address]  [phone]  Schedule an appointment as soon as possible for a visit in 10 days  For suture removal    FINAL IMPRESSION  1. Laceration of right index finger without foreign body without damage to nail, initial encounter    2.      Laceration Repair Procedure    Abdullahi BOYER (Stephanieibmichael), am scribing for, and in the presence of, Priya Hansen D.O..    Electronically signed by: Abdullahi Zee (Jacinda), 10/24/2021    Priya BOYER D.O. personally performed the services described in this documentation, as scribed by Abdullahi Zee in my presence, and it is both accurate and complete.    The note accurately reflects work and decisions made by me.  Priya Hansen D.O.  10/24/2021  9:11 AM

## 2021-10-24 NOTE — ED TRIAGE NOTES
"Chief Complaint   Patient presents with   • Laceration     Right First Digit, \"prepping food for tomorrow\"     /83   Pulse 89   Temp 36.4 °C (97.5 °F) (Temporal)   Resp 15   Ht 1.829 m (6')   Wt 95.7 kg (210 lb 15.7 oz)   SpO2 96%   BMI 28.61 kg/m²     Has this patient been vaccinated for COVID NO  If not, would they like to be vaccinated while in the ER if eligible?  No  Would the patient like to speak with the ERP about the possibility of receiving the COVID vaccine today before making a decision? NO    "

## 2021-10-24 NOTE — DISCHARGE INSTRUCTIONS
Clean your wound daily with 50-50 hydrogen peroxide and water solution, pat dry and keep open to air  Keep your finger covered with a bandage while you are working or a finger cot.  Sutures are to be removed in 7 to 10 days with your primary care doctor or urgent care.  Tylenol or ibuprofen for pain  Watch closely for signs of infection i.e. increased redness, swelling or drainage.

## 2021-11-07 ENCOUNTER — OFFICE VISIT (OUTPATIENT)
Dept: URGENT CARE | Facility: CLINIC | Age: 36
End: 2021-11-07
Payer: MEDICAID

## 2021-11-07 VITALS
TEMPERATURE: 98 F | SYSTOLIC BLOOD PRESSURE: 138 MMHG | RESPIRATION RATE: 16 BRPM | HEIGHT: 72 IN | DIASTOLIC BLOOD PRESSURE: 80 MMHG | HEART RATE: 73 BPM | BODY MASS INDEX: 28.04 KG/M2 | WEIGHT: 207 LBS | OXYGEN SATURATION: 96 %

## 2021-11-07 DIAGNOSIS — Z48.02 VISIT FOR SUTURE REMOVAL: ICD-10-CM

## 2021-11-07 DIAGNOSIS — S61.011D LACERATION OF RIGHT THUMB WITHOUT FOREIGN BODY WITHOUT DAMAGE TO NAIL, SUBSEQUENT ENCOUNTER: ICD-10-CM

## 2021-11-07 PROCEDURE — 99212 OFFICE O/P EST SF 10 MIN: CPT | Performed by: FAMILY MEDICINE

## 2021-11-07 ASSESSMENT — FIBROSIS 4 INDEX: FIB4 SCORE: 0.79

## 2021-11-08 NOTE — PROGRESS NOTES
HPI:   Presents for suture removal 14 days post procedure. R thumb aceration without pain, discharge or redness.     ROS:   no fever, no sensory loss, no weakness    Exam:   Gen: WDWN in no distress  Wound: well healing laceration. 3 interrupted sutures removed without difficulty. No evidence of dehiscence or infection.  Neuro: sensory/motor intact     A/P   Laceration  Suture Removal  F/U prn

## 2022-02-21 ENCOUNTER — OFFICE VISIT (OUTPATIENT)
Dept: URGENT CARE | Facility: CLINIC | Age: 37
End: 2022-02-21
Payer: MEDICAID

## 2022-02-21 ENCOUNTER — APPOINTMENT (OUTPATIENT)
Dept: RADIOLOGY | Facility: IMAGING CENTER | Age: 37
End: 2022-02-21
Attending: PHYSICIAN ASSISTANT
Payer: MEDICAID

## 2022-02-21 VITALS
BODY MASS INDEX: 29.4 KG/M2 | DIASTOLIC BLOOD PRESSURE: 82 MMHG | HEIGHT: 71 IN | SYSTOLIC BLOOD PRESSURE: 120 MMHG | WEIGHT: 210 LBS | HEART RATE: 68 BPM | TEMPERATURE: 97.8 F | RESPIRATION RATE: 16 BRPM | OXYGEN SATURATION: 97 %

## 2022-02-21 DIAGNOSIS — M25.531 ACUTE PAIN OF RIGHT WRIST: ICD-10-CM

## 2022-02-21 DIAGNOSIS — S66.911A STRAIN OF RIGHT WRIST, INITIAL ENCOUNTER: ICD-10-CM

## 2022-02-21 PROCEDURE — 99213 OFFICE O/P EST LOW 20 MIN: CPT | Performed by: PHYSICIAN ASSISTANT

## 2022-02-21 PROCEDURE — 73110 X-RAY EXAM OF WRIST: CPT | Mod: TC,RT | Performed by: RADIOLOGY

## 2022-02-21 ASSESSMENT — ENCOUNTER SYMPTOMS
TINGLING: 0
NUMBNESS: 0
MUSCLE WEAKNESS: 0

## 2022-02-21 ASSESSMENT — FIBROSIS 4 INDEX: FIB4 SCORE: 0.79

## 2022-02-22 NOTE — PROGRESS NOTES
"  Subjective:   Maxime Fernandez is a 36 y.o. male who presents today with   Chief Complaint   Patient presents with   • Wrist Injury     (R) wrist/palm hand. Lifting weights, heard pop. X last night        Wrist Injury   The incident occurred 12 to 24 hours ago. The incident occurred at the gym. The pain is present in the right wrist. The quality of the pain is described as aching and shooting. The pain radiates to the right hand and right arm. The pain is moderate. The pain has been constant since the incident. Pertinent negatives include no chest pain, muscle weakness, numbness or tingling. He has tried nothing for the symptoms. The treatment provided no relief.     Patient states he was lifting weights and hyperextended his wrist and felt a pop.  PMH:  has a past medical history of Anxiety and Psychiatric disorder.  MEDS:   Current Outpatient Medications:   •  ondansetron (ZOFRAN) 4 MG Tab tablet, Take 1 tablet by mouth every 8 hours as needed for Nausea/Vomiting. (Patient not taking: Reported on 2/21/2022), Disp: 20 tablet, Rfl: 0  •  hydrOXYzine pamoate (VISTARIL) 50 MG Cap, Take 50 mg by mouth every 8 hours as needed. (Patient not taking: Reported on 2/21/2022), Disp: , Rfl:   ALLERGIES: No Known Allergies  SURGHX: No past surgical history on file.  SOCHX:  reports that he has never smoked. He has never used smokeless tobacco. He reports current alcohol use. He reports that he does not use drugs.  FH: Reviewed with patient, not pertinent to this visit.       Review of Systems   Cardiovascular: Negative for chest pain.   Musculoskeletal:        Right wrist pain   Neurological: Negative for tingling and numbness.        Objective:   /82   Pulse 68   Temp 36.6 °C (97.8 °F)   Resp 16   Ht 1.803 m (5' 11\")   Wt 95.3 kg (210 lb)   SpO2 97%   BMI 29.29 kg/m²   Physical Exam  Vitals and nursing note reviewed.   Constitutional:       General: He is not in acute distress.     Appearance: Normal " appearance. He is well-developed. He is not ill-appearing or toxic-appearing.   HENT:      Head: Normocephalic and atraumatic.      Right Ear: Hearing normal.      Left Ear: Hearing normal.   Eyes:      Pupils: Pupils are equal, round, and reactive to light.   Cardiovascular:      Rate and Rhythm: Normal rate and regular rhythm.      Heart sounds: Normal heart sounds.   Pulmonary:      Effort: Pulmonary effort is normal.   Musculoskeletal:      Comments: Patient has pain with supination pronation of the right wrist.  Tenderness to palpation to the ulnar aspect of the right wrist.  No significant swelling or deformity appreciated. 5/5 strength.  Neurovascular intact distally.  No anatomical snuffbox tenderness.   Skin:     General: Skin is warm and dry.   Neurological:      Mental Status: He is alert.      Coordination: Coordination normal.   Psychiatric:         Mood and Affect: Mood normal.       DX WRIST    FINDINGS:  Bone mineralization is normal.  There is no evidence of fracture or dislocation.  Soft tissues are normal.     IMPRESSION:     No evidence of fracture or dislocation.    Assessment/Plan:   Assessment    1. Acute pain of right wrist  - DX-WRIST-COMPLETE 3+ RIGHT; Future    2. Strain of right wrist, initial encounter  - Referral to Sports Medicine  Symptoms and presentation most consistent with strain of the right wrist.  Wrist brace was provided for patient today and recommend he wear this over the next week to 2 weeks and slowly get back to normal activities as tolerated.  Follow-up with sports medicine if symptoms do not improve.  Rest, ice, compression and elevation recommended.  Differential diagnosis, natural history, supportive care, and indications for immediate follow-up discussed.   Patient given instructions and understanding of medications and treatment.    If not improving in 3-5 days, F/U with PCP or return to  if symptoms worsen.    Patient agreeable to plan.      Please note that this  dictation was created using voice recognition software. I have made every reasonable attempt to correct obvious errors, but I expect that there are errors of grammar and possibly content that I did not discover before finalizing the note.    Alfie North PA-C

## 2022-04-03 ENCOUNTER — OFFICE VISIT (OUTPATIENT)
Dept: URGENT CARE | Facility: PHYSICIAN GROUP | Age: 37
End: 2022-04-03

## 2022-04-03 VITALS
HEART RATE: 76 BPM | OXYGEN SATURATION: 96 % | SYSTOLIC BLOOD PRESSURE: 118 MMHG | TEMPERATURE: 98.9 F | RESPIRATION RATE: 16 BRPM | HEIGHT: 72 IN | BODY MASS INDEX: 28.31 KG/M2 | WEIGHT: 209 LBS | DIASTOLIC BLOOD PRESSURE: 82 MMHG

## 2022-04-03 DIAGNOSIS — S61.012A LACERATION OF LEFT THUMB WITHOUT FOREIGN BODY WITHOUT DAMAGE TO NAIL, INITIAL ENCOUNTER: ICD-10-CM

## 2022-04-03 PROCEDURE — 12001 RPR S/N/AX/GEN/TRNK 2.5CM/<: CPT | Performed by: PHYSICIAN ASSISTANT

## 2022-04-03 RX ORDER — ESCITALOPRAM OXALATE 10 MG/1
TABLET ORAL
COMMUNITY
End: 2022-04-22

## 2022-04-03 RX ORDER — TOPIRAMATE 50 MG/1
TABLET, FILM COATED ORAL
COMMUNITY
End: 2022-04-22

## 2022-04-03 RX ORDER — HYDROXYZINE HYDROCHLORIDE 25 MG/1
TABLET, FILM COATED ORAL
COMMUNITY
End: 2022-04-22

## 2022-04-03 RX ORDER — TOPIRAMATE 25 MG/1
TABLET ORAL
COMMUNITY
End: 2022-04-22

## 2022-04-03 ASSESSMENT — ENCOUNTER SYMPTOMS
VOMITING: 0
HEADACHES: 0
SORE THROAT: 0
NAUSEA: 0
SHORTNESS OF BREATH: 0
FEVER: 0
EYE DISCHARGE: 0
COUGH: 0
EYE REDNESS: 0

## 2022-04-03 ASSESSMENT — FIBROSIS 4 INDEX: FIB4 SCORE: 0.79

## 2022-04-03 NOTE — PROGRESS NOTES
Subjective     Maxime Fernandez is a 36 y.o. male who presents with Laceration (Left Thumb 30 Minutes ago )        This is a new problem.  The patient presents to clinic complaining of a laceration to his left thumb.  The patient states he was using a mandolin to slice onions when he accidentally cut his left thumb.  The patient reports a mild stinging sensation to his left thumb at the time of the injury.  The patient reports no persistent pain to the left thumb.  He also reports no associated swelling, bruising, or redness.  The patient states the bleeding is now resolved.  He reports no decreased range of motion of the left thumb.  No numbness, tingling, or weakness.  The patient has not taken any OTC medications for his current symptoms.  The patient states his tetanus is up-to-date within the past 10 years.  The patient is left-handed.    Laceration   The incident occurred less than 1 hour ago. Pain location: left thumb. The laceration is 1 cm in size. The laceration mechanism was a clean knife. The patient is experiencing no pain. He reports no foreign bodies present. His tetanus status is UTD.     PMH:  has a past medical history of Anxiety and Psychiatric disorder.  MEDS:   Current Outpatient Medications:   •  escitalopram (LEXAPRO) 10 MG Tab, escitalopram 10 mg tablet, Disp: , Rfl:   •  hydrOXYzine HCl (ATARAX) 25 MG Tab, hydroxyzine HCl 25 mg tablet, Disp: , Rfl:   •  topiramate (TOPAMAX) 25 MG Tab, topiramate 25 mg tablet, Disp: , Rfl:   •  topiramate (TOPAMAX) 50 MG tablet, topiramate 50 mg tablet, Disp: , Rfl:   ALLERGIES: No Known Allergies  SURGHX: History reviewed. No pertinent surgical history.  SOCHX:  reports that he has never smoked. He has never used smokeless tobacco. He reports current alcohol use. He reports that he does not use drugs.  FH: Family history was reviewed, no pertinent findings to report    Review of Systems   Constitutional: Negative for fever.   HENT: Negative for  congestion, ear pain and sore throat.    Eyes: Negative for discharge and redness.   Respiratory: Negative for cough and shortness of breath.    Cardiovascular: Negative for chest pain and leg swelling.   Gastrointestinal: Negative for nausea and vomiting.   Musculoskeletal: Negative for joint pain.   Skin: Negative for rash.        + laceration to left thumb   Neurological: Negative for headaches.              Objective     /82 (BP Location: Right arm, Patient Position: Sitting, BP Cuff Size: Adult)   Pulse 76   Temp 37.2 °C (98.9 °F) (Temporal)   Resp 16   Ht 1.829 m (6')   Wt 94.8 kg (209 lb)   SpO2 96%   BMI 28.35 kg/m²      Physical Exam  Constitutional:       General: He is not in acute distress.     Appearance: Normal appearance. He is well-developed. He is not ill-appearing.   HENT:      Head: Normocephalic and atraumatic.      Right Ear: External ear normal.      Left Ear: External ear normal.   Eyes:      Extraocular Movements: Extraocular movements intact.      Conjunctiva/sclera: Conjunctivae normal.   Cardiovascular:      Rate and Rhythm: Normal rate.   Pulmonary:      Effort: Pulmonary effort is normal.   Musculoskeletal:      Cervical back: Normal range of motion and neck supple.      Comments:   Left Thumb:  A 1cm superficial laceration to the distal palmar aspect of the left thumb.  No tenderness to palpation.  No swelling.  No ecchymosis.  No surrounding erythema.  No increased warmth.  No active bleeding.  No visible foreign bodies.  The patient's thumbnail is intact without damage.   ROM intact -the patient demonstrates full active range of motion of the left thumb  Neurovascular intact distally  Strength 5/5 -flexion/extension of the left thumb against resistance   Skin:     General: Skin is warm and dry.   Neurological:      Mental Status: He is alert and oriented to person, place, and time.               Progress:  Wound Care:   Cleansed the patient's laceration with diluted  Hibiclens.  Irrigated the laceration with saline bullets.  The patient's laceration is fairly superficial and well approximated.  Discussed various closure options with the patient.  The patient elects to close the laceration with Dermabond.  Applied Dermabond to the patient's laceration with good wound approximation.  Educated patient on proper wound care.  Advised patient to monitor for signs of infection.             Assessment & Plan          1. Laceration of left thumb without foreign body without damage to nail, initial encounter    Differential diagnoses, supportive care, and indications for immediate follow-up discussed with patient.   Instructed to return to clinic or nearest emergency department for any change in condition, further concerns, or worsening of symptoms.    OTC Tylenol or Motrin for fever/discomfort.  Keep laceration clean and dry  Cover laceration as needed  Allow wound to be open to the air for at least a portion of the day  Avoid soaking the wound  Monitor for signs of infection  Follow-up with PCP  Return to clinic or go to the ED if symptoms worsen or fail to improve, or if patient should develop worsening/increasing/persistent pain/tenderness to the injured area, swelling, bruising, redness or warmth to the injured area, discharge/drainage from the wound, decreased range of motion, fever/chills, secondary signs of infection, and/or any concerning symptoms.    Discussed plan with the patient, and he agrees to the above.    I personally reviewed prior external notes and test results pertinent to today's visit.  I have independently reviewed and interpreted all diagnostics ordered during this urgent care visit.     Please note that this dictation was created using voice recognition software. I have made every reasonable attempt to correct obvious errors, but I expect that there may be errors of grammar and possibly content that I did not discover before finalizing the note.     This note  was electronically signed by Lindsay Mcclain PA-C

## 2022-04-11 ENCOUNTER — OFFICE VISIT (OUTPATIENT)
Dept: URGENT CARE | Facility: CLINIC | Age: 37
End: 2022-04-11
Payer: MEDICAID

## 2022-04-11 ENCOUNTER — HOSPITAL ENCOUNTER (OUTPATIENT)
Facility: MEDICAL CENTER | Age: 37
End: 2022-04-11
Attending: PHYSICIAN ASSISTANT
Payer: MEDICAID

## 2022-04-11 VITALS
WEIGHT: 208.6 LBS | OXYGEN SATURATION: 96 % | BODY MASS INDEX: 29.2 KG/M2 | RESPIRATION RATE: 16 BRPM | DIASTOLIC BLOOD PRESSURE: 64 MMHG | HEART RATE: 69 BPM | SYSTOLIC BLOOD PRESSURE: 106 MMHG | HEIGHT: 71 IN | TEMPERATURE: 96.5 F

## 2022-04-11 DIAGNOSIS — J34.89 RHINORRHEA: ICD-10-CM

## 2022-04-11 DIAGNOSIS — Z91.09 ENVIRONMENTAL ALLERGIES: ICD-10-CM

## 2022-04-11 DIAGNOSIS — R09.81 NASAL CONGESTION: ICD-10-CM

## 2022-04-11 LAB
EXTERNAL QUALITY CONTROL: NORMAL
SARS-COV+SARS-COV-2 AG RESP QL IA.RAPID: NEGATIVE

## 2022-04-11 PROCEDURE — U0005 INFEC AGEN DETEC AMPLI PROBE: HCPCS

## 2022-04-11 PROCEDURE — 87426 SARSCOV CORONAVIRUS AG IA: CPT | Performed by: PHYSICIAN ASSISTANT

## 2022-04-11 PROCEDURE — 99213 OFFICE O/P EST LOW 20 MIN: CPT | Performed by: PHYSICIAN ASSISTANT

## 2022-04-11 PROCEDURE — U0003 INFECTIOUS AGENT DETECTION BY NUCLEIC ACID (DNA OR RNA); SEVERE ACUTE RESPIRATORY SYNDROME CORONAVIRUS 2 (SARS-COV-2) (CORONAVIRUS DISEASE [COVID-19]), AMPLIFIED PROBE TECHNIQUE, MAKING USE OF HIGH THROUGHPUT TECHNOLOGIES AS DESCRIBED BY CMS-2020-01-R: HCPCS

## 2022-04-11 RX ORDER — CETIRIZINE HYDROCHLORIDE 10 MG/1
10 TABLET ORAL DAILY
Qty: 30 TABLET | Refills: 0 | Status: SHIPPED | OUTPATIENT
Start: 2022-04-11 | End: 2022-04-22

## 2022-04-11 RX ORDER — FLUTICASONE PROPIONATE 50 MCG
1 SPRAY, SUSPENSION (ML) NASAL DAILY
Qty: 15.8 ML | Refills: 0 | Status: SHIPPED | OUTPATIENT
Start: 2022-04-11 | End: 2022-09-22

## 2022-04-11 ASSESSMENT — FIBROSIS 4 INDEX: FIB4 SCORE: 0.79

## 2022-04-11 NOTE — LETTER
April 11, 2022         Patient: Maxime Fernandez   YOB: 1985   Date of Visit: 4/11/2022           To Whom it May Concern:    Maxime Fernandez was seen in my clinic on 4/11/2022.  He had a negative rapid test for COVID-19 virus in the urgent care setting today.    If you have any questions or concerns, please don't hesitate to call.        Sincerely,           Mabel Guerra P.A.-C.  Electronically Signed

## 2022-04-12 DIAGNOSIS — R09.81 NASAL CONGESTION: ICD-10-CM

## 2022-04-12 DIAGNOSIS — J34.89 RHINORRHEA: ICD-10-CM

## 2022-04-18 ASSESSMENT — ENCOUNTER SYMPTOMS
EYE PAIN: 0
BLURRED VISION: 0
FEVER: 0
DIARRHEA: 0
DIZZINESS: 0
PALPITATIONS: 0
VOMITING: 0
MYALGIAS: 0
SHORTNESS OF BREATH: 0
ABDOMINAL PAIN: 0
SINUS PAIN: 0
CHILLS: 0
COUGH: 0
NAUSEA: 0
SORE THROAT: 0
HEADACHES: 1

## 2022-04-19 NOTE — PROGRESS NOTES
Subjective     Maxime Fernandez is a 36 y.o. male who presents with Otalgia (Congested, work wants patient to get COVID tested, typically gets allergies around this time x )    HPI:  Maxime Fernandez is a 36 y.o. male who presents today for coronavirus screening.  Patient reports that he gets apartment allergies and every year around this time they flareup.  It has been especially bad over the past few days with all of the increased wind and temperature changes.  He notes that he has had itchy/watery eyes, nasal congestion/runny nose, and ear fullness.  He normally takes Benadryl to help but has not taken any while he has been at work as it causes drowsiness.  Based on his symptoms his employer is requesting that he get a COVID test before he can return to work.  He has not had any fever/chills, chest pain, shortness of breath, body aches, or N/V/D.      Review of Systems   Constitutional: Negative for chills, fever and malaise/fatigue.   HENT: Positive for congestion and ear pain. Negative for sinus pain and sore throat.    Eyes: Negative for blurred vision and pain.   Respiratory: Negative for cough and shortness of breath.    Cardiovascular: Negative for chest pain and palpitations.   Gastrointestinal: Negative for abdominal pain, diarrhea, nausea and vomiting.   Musculoskeletal: Negative for myalgias.   Skin: Negative for rash.   Neurological: Positive for headaches. Negative for dizziness.   Endo/Heme/Allergies: Positive for environmental allergies.       PMH:  has a past medical history of Anxiety and Psychiatric disorder.  MEDS:   Current Outpatient Medications:   •  cetirizine (ZYRTEC) 10 MG Tab, Take 1 Tablet by mouth every day., Disp: 30 Tablet, Rfl: 0  •  fluticasone (FLONASE) 50 MCG/ACT nasal spray, Administer 1 Spray into affected nostril(S) every day., Disp: 15.8 mL, Rfl: 0  •  escitalopram (LEXAPRO) 10 MG Tab, escitalopram 10 mg tablet (Patient not taking: Reported on 4/11/2022), Disp: ,  "Rfl:   •  hydrOXYzine HCl (ATARAX) 25 MG Tab, hydroxyzine HCl 25 mg tablet (Patient not taking: Reported on 4/11/2022), Disp: , Rfl:   •  topiramate (TOPAMAX) 25 MG Tab, topiramate 25 mg tablet (Patient not taking: Reported on 4/11/2022), Disp: , Rfl:   •  topiramate (TOPAMAX) 50 MG tablet, topiramate 50 mg tablet (Patient not taking: Reported on 4/11/2022), Disp: , Rfl:   ALLERGIES: No Known Allergies  SURGHX: No past surgical history on file.  SOCHX:  reports that he has never smoked. He has never used smokeless tobacco. He reports current alcohol use. He reports that he does not use drugs.  FH: Family history was reviewed, no pertinent findings to report      Objective     /64 (BP Location: Right arm, Patient Position: Sitting, BP Cuff Size: Adult)   Pulse 69   Temp 35.8 °C (96.5 °F) (Temporal)   Resp 16   Ht 1.803 m (5' 11\")   Wt 94.6 kg (208 lb 9.6 oz)   SpO2 96%   BMI 29.09 kg/m²      Physical Exam  Constitutional:       Appearance: He is well-developed.   HENT:      Head: Normocephalic and atraumatic.      Right Ear: Tympanic membrane, ear canal and external ear normal.      Left Ear: Tympanic membrane, ear canal and external ear normal.      Nose: Rhinorrhea present. Rhinorrhea is clear.      Right Turbinates: Swollen and pale.      Left Turbinates: Swollen and pale.      Mouth/Throat:      Lips: Pink.      Mouth: Mucous membranes are moist.      Pharynx: Oropharynx is clear.   Eyes:      Conjunctiva/sclera: Conjunctivae normal.      Pupils: Pupils are equal, round, and reactive to light.   Cardiovascular:      Rate and Rhythm: Normal rate and regular rhythm.      Heart sounds: Normal heart sounds. No murmur heard.  Pulmonary:      Effort: Pulmonary effort is normal.      Breath sounds: Normal breath sounds. No wheezing.   Musculoskeletal:      Cervical back: Normal range of motion.   Lymphadenopathy:      Cervical: No cervical adenopathy.   Skin:     General: Skin is warm and dry.      " Capillary Refill: Capillary refill takes less than 2 seconds.   Neurological:      Mental Status: He is alert and oriented to person, place, and time.   Psychiatric:         Behavior: Behavior normal.         Judgment: Judgment normal.                 POCT SARS-COV Antigen FREDDIE (Symptomatic only) - Negative    Assessment & Plan     1. Nasal congestion  - cetirizine (ZYRTEC) 10 MG Tab; Take 1 Tablet by mouth every day.  Dispense: 30 Tablet; Refill: 0  - fluticasone (FLONASE) 50 MCG/ACT nasal spray; Administer 1 Spray into affected nostril(S) every day.  Dispense: 15.8 mL; Refill: 0  - POCT SARS-COV Antigen FREDDIE (Symptomatic only)  - SARS-CoV-2, PCR (In-House); Future    2. Rhinorrhea  - cetirizine (ZYRTEC) 10 MG Tab; Take 1 Tablet by mouth every day.  Dispense: 30 Tablet; Refill: 0  - fluticasone (FLONASE) 50 MCG/ACT nasal spray; Administer 1 Spray into affected nostril(S) every day.  Dispense: 15.8 mL; Refill: 0  - POCT SARS-COV Antigen FREDDIE (Symptomatic only)  - SARS-CoV-2, PCR (In-House); Future    3. Environmental allergies  - cetirizine (ZYRTEC) 10 MG Tab; Take 1 Tablet by mouth every day.  Dispense: 30 Tablet; Refill: 0  - fluticasone (FLONASE) 50 MCG/ACT nasal spray; Administer 1 Spray into affected nostril(S) every day.  Dispense: 15.8 mL; Refill: 0            Differential Diagnosis, natural history, and supportive care discussed. Return to the Urgent Care or follow up with your PCP if symptoms fail to resolve, or for any new or worsening symptoms. Emergency room precautions discussed. Patient and/or family appears understanding of information.

## 2022-04-22 ENCOUNTER — HOSPITAL ENCOUNTER (OUTPATIENT)
Facility: MEDICAL CENTER | Age: 37
End: 2022-04-22
Attending: PHYSICIAN ASSISTANT
Payer: MEDICAID

## 2022-04-22 ENCOUNTER — OFFICE VISIT (OUTPATIENT)
Dept: URGENT CARE | Facility: CLINIC | Age: 37
End: 2022-04-22
Payer: MEDICAID

## 2022-04-22 VITALS
WEIGHT: 206 LBS | HEIGHT: 71 IN | SYSTOLIC BLOOD PRESSURE: 116 MMHG | TEMPERATURE: 97.3 F | HEART RATE: 96 BPM | DIASTOLIC BLOOD PRESSURE: 78 MMHG | RESPIRATION RATE: 18 BRPM | BODY MASS INDEX: 28.84 KG/M2 | OXYGEN SATURATION: 97 %

## 2022-04-22 DIAGNOSIS — J02.9 SORE THROAT: ICD-10-CM

## 2022-04-22 DIAGNOSIS — J10.1 INFLUENZA A: ICD-10-CM

## 2022-04-22 DIAGNOSIS — J30.2 SEASONAL ALLERGIC RHINITIS, UNSPECIFIED TRIGGER: ICD-10-CM

## 2022-04-22 LAB
COVID ORDER STATUS COVID19: NORMAL
FLUAV+FLUBV AG SPEC QL IA: POSITIVE
INT CON NEG: NORMAL
INT CON NEG: NORMAL
INT CON POS: NORMAL
INT CON POS: NORMAL
S PYO AG THROAT QL: NEGATIVE

## 2022-04-22 PROCEDURE — U0003 INFECTIOUS AGENT DETECTION BY NUCLEIC ACID (DNA OR RNA); SEVERE ACUTE RESPIRATORY SYNDROME CORONAVIRUS 2 (SARS-COV-2) (CORONAVIRUS DISEASE [COVID-19]), AMPLIFIED PROBE TECHNIQUE, MAKING USE OF HIGH THROUGHPUT TECHNOLOGIES AS DESCRIBED BY CMS-2020-01-R: HCPCS

## 2022-04-22 PROCEDURE — 99214 OFFICE O/P EST MOD 30 MIN: CPT | Performed by: PHYSICIAN ASSISTANT

## 2022-04-22 PROCEDURE — 87804 INFLUENZA ASSAY W/OPTIC: CPT | Performed by: PHYSICIAN ASSISTANT

## 2022-04-22 PROCEDURE — U0005 INFEC AGEN DETEC AMPLI PROBE: HCPCS

## 2022-04-22 PROCEDURE — 87880 STREP A ASSAY W/OPTIC: CPT | Performed by: PHYSICIAN ASSISTANT

## 2022-04-22 RX ORDER — OSELTAMIVIR PHOSPHATE 75 MG/1
75 CAPSULE ORAL 2 TIMES DAILY
Qty: 10 CAPSULE | Refills: 0 | Status: SHIPPED | OUTPATIENT
Start: 2022-04-22 | End: 2022-09-22

## 2022-04-22 ASSESSMENT — ENCOUNTER SYMPTOMS
SHORTNESS OF BREATH: 0
SPUTUM PRODUCTION: 0
MYALGIAS: 0
CHILLS: 0
FEVER: 0
COUGH: 1
SORE THROAT: 1

## 2022-04-22 ASSESSMENT — FIBROSIS 4 INDEX: FIB4 SCORE: 0.79

## 2022-04-22 NOTE — LETTER
April 22, 2022         Patient: Maxime Fernandez   YOB: 1985   Date of Visit: 4/22/2022           To Whom it May Concern:    Maxime Fernandez was seen in my clinic on 4/22/2022.  Please excuse patient's absence through 4/25/2022.  He should be able to return on that day as long as he is not having any new fevers or symptoms.    If you have any questions or concerns, please don't hesitate to call.        Sincerely,           Alfie North P.A.-C.  Electronically Signed

## 2022-04-22 NOTE — PROGRESS NOTES
"  Subjective:   Maxime Fernandez is a 36 y.o. male who presents today with   Chief Complaint   Patient presents with   • Pharyngitis     Pt has a sore throat, congestion, cough x 2 days     Pharyngitis   This is a new problem. Episode onset: 2 days. The problem has been unchanged. There has been no fever. The pain is mild. Associated symptoms include congestion and coughing. Pertinent negatives include no shortness of breath.     I personally donned proper PPE throughout visit today.   Patient's daughter and stepson are positive for strep.  PMH:  has a past medical history of Anxiety and Psychiatric disorder.  MEDS:   Current Outpatient Medications:   •  oseltamivir (TAMIFLU) 75 MG Cap, Take 1 Capsule by mouth 2 times a day., Disp: 10 Capsule, Rfl: 0  •  fluticasone (FLONASE) 50 MCG/ACT nasal spray, Administer 1 Spray into affected nostril(S) every day., Disp: 15.8 mL, Rfl: 0  ALLERGIES: No Known Allergies  SURGHX: History reviewed. No pertinent surgical history.  SOCHX:  reports that he has never smoked. He has never used smokeless tobacco. He reports previous alcohol use. He reports that he does not use drugs.  FH: Reviewed with patient, not pertinent to this visit.     Review of Systems   Constitutional: Negative for chills and fever.   HENT: Positive for congestion and sore throat.         Sneezing and runny nose   Respiratory: Positive for cough. Negative for sputum production and shortness of breath.    Musculoskeletal: Negative for myalgias.      Objective:   /78 (BP Location: Right arm, Patient Position: Sitting, BP Cuff Size: Large adult)   Pulse 96   Temp 36.3 °C (97.3 °F) (Temporal)   Resp 18   Ht 1.803 m (5' 11\")   Wt 93.4 kg (206 lb)   SpO2 97%   BMI 28.73 kg/m²   Physical Exam  Vitals and nursing note reviewed.   Constitutional:       General: He is not in acute distress.     Appearance: Normal appearance. He is well-developed. He is not ill-appearing or toxic-appearing.   HENT:      " Head: Normocephalic and atraumatic.      Right Ear: Hearing normal.      Left Ear: Hearing normal.      Nose: Rhinorrhea present.      Mouth/Throat:      Pharynx: Uvula midline. Posterior oropharyngeal erythema present. No oropharyngeal exudate or uvula swelling.      Tonsils: No tonsillar exudate.   Eyes:      Conjunctiva/sclera: Conjunctivae normal.   Cardiovascular:      Rate and Rhythm: Normal rate and regular rhythm.      Heart sounds: Normal heart sounds.   Pulmonary:      Effort: Pulmonary effort is normal.      Breath sounds: Normal breath sounds. No stridor. No wheezing, rhonchi or rales.   Musculoskeletal:      Comments: Normal movement in all 4 extremities   Lymphadenopathy:      Cervical: No cervical adenopathy.   Skin:     General: Skin is warm and dry.   Neurological:      Mental Status: He is alert.      Coordination: Coordination normal.   Psychiatric:         Mood and Affect: Mood normal.     STREP A Negative   FLU A +  Assessment/Plan:   Assessment    1. Sore throat  - POCT Rapid Strep A  - POCT Influenza A/B  - SARS-CoV-2 PCR (24 hour In-House): Collect NP swab in [facility]; Future    2. Seasonal allergic rhinitis, unspecified trigger    3. Influenza A  - oseltamivir (TAMIFLU) 75 MG Cap; Take 1 Capsule by mouth 2 times a day.  Dispense: 10 Capsule; Refill: 0  Symptoms and presentation consistent with viral illness and we will rule out COVID at this time.  Vital signs are stable on exam today.  Discussed CDC guidelines including self isolation at home.   Patient encouraged to get plenty of rest, use OTC tylenol for pain/fever, and drink plenty of fluids.  Also be related to allergy symptoms and recommend continued use of Flonase and Zyrtec.  Differential diagnosis, natural history, supportive care, and indications for immediate follow-up discussed.   Patient given instructions and understanding of medications and treatment.    If not improving in 3-5 days, F/U with PCP or return to  if symptoms  worsen.    Patient agreeable to plan.      Please note that this dictation was created using voice recognition software. I have made every reasonable attempt to correct obvious errors, but I expect that there are errors of grammar and possibly content that I did not discover before finalizing the note.    Alfie North PA-C

## 2022-04-23 LAB
SARS-COV-2 RNA RESP QL NAA+PROBE: NOTDETECTED
SPECIMEN SOURCE: NORMAL

## 2022-07-27 ENCOUNTER — HOSPITAL ENCOUNTER (EMERGENCY)
Facility: MEDICAL CENTER | Age: 37
End: 2022-07-27
Attending: EMERGENCY MEDICINE
Payer: MEDICAID

## 2022-07-27 VITALS
RESPIRATION RATE: 18 BRPM | HEIGHT: 72 IN | TEMPERATURE: 98 F | HEART RATE: 94 BPM | BODY MASS INDEX: 28.49 KG/M2 | WEIGHT: 210.32 LBS | OXYGEN SATURATION: 93 % | SYSTOLIC BLOOD PRESSURE: 133 MMHG | DIASTOLIC BLOOD PRESSURE: 87 MMHG

## 2022-07-27 DIAGNOSIS — Z11.52 ENCOUNTER FOR SCREENING LABORATORY TESTING FOR COVID-19 VIRUS: ICD-10-CM

## 2022-07-27 DIAGNOSIS — U07.1 COVID-19: ICD-10-CM

## 2022-07-27 DIAGNOSIS — B34.9 VIRAL SYNDROME: ICD-10-CM

## 2022-07-27 LAB
FLUAV RNA SPEC QL NAA+PROBE: NEGATIVE
FLUBV RNA SPEC QL NAA+PROBE: NEGATIVE
RSV RNA SPEC QL NAA+PROBE: NEGATIVE
SARS-COV-2 RNA RESP QL NAA+PROBE: DETECTED
SPECIMEN SOURCE: ABNORMAL

## 2022-07-27 PROCEDURE — 0241U HCHG SARS-COV-2 COVID-19 NFCT DS RESP RNA 4 TRGT MIC: CPT

## 2022-07-27 PROCEDURE — C9803 HOPD COVID-19 SPEC COLLECT: HCPCS | Performed by: EMERGENCY MEDICINE

## 2022-07-27 PROCEDURE — 99283 EMERGENCY DEPT VISIT LOW MDM: CPT

## 2022-07-27 ASSESSMENT — FIBROSIS 4 INDEX: FIB4 SCORE: 0.79

## 2022-07-28 NOTE — DISCHARGE INSTRUCTIONS
Please quarantine per CDC guidelines.  Please visit the CDC website for specific quarantine protocols and recommendations.    Use over-the-counter Tylenol, ibuprofen, Mucinex, and over-the-counter cough/cold remedies to help with your symptoms.    Drink plenty of fluids to stay well-hydrated.    Return to the ER for any worsening sore throat, shortness of breath, chest pain, coughing of rust colored phlegm or blood, pain or swelling in your legs, vomiting, diarrhea, worsening headache, stiff neck, rash, fevers over 100.4, or for any concerns.    Your COVID-19 test results should be back within the next 12 to 24 hours.  The results will be posted to your [employer] account.

## 2022-07-28 NOTE — ED PROVIDER NOTES
ED Provider Note    Scribed for Torie Barrett M.D. by Wolf Chiu. 7/27/2022  7:49 PM    Primary care provider: Pcp Not In Computer  Means of arrival: Walk in  History obtained from: Patient  History limited by: None    CHIEF COMPLAINT  Chief Complaint   Patient presents with   • Coronavirus Screening     Pt started having an itchy throat yesterday, then body aches and head aches today. He took two tests, one came out positive and one came out negative. Pt wants a definitive answer, says his boss is covid positive.        HPI  Maxime Fernandez is a 36 y.o. male who presents for COVID test.  He developed COVID-like symptoms onset three days ago.  Patient developed an itchy itchy throat 3 days ago.  Yesterday he started experiencing body aches. Today, he experienced runny nose and took allergy medicine. Patient's boss informed patient today that he tested positive for COVID-19.  At the advice of his boss, patient took an at-home COVID-19 test which was negative.  However, he was not convinced of the result, so he he took another COVID-19 test that he bought at [facility] and that test resulted as positive. He admits to associated symptoms of cough, lightheadedness, headache, but denies blood in cough, chest pain, shortness of breath, fever, or diarrhea. No alleviating factors were reported. Patient had COVID-19 in December. He is not vaccinated for COVID-19.  Patient states his boss wanted an official test for COVID, and that is why he is here in the ER today.    PPE Note: I personally donned PPE for all patient encounters during this visit, with an N95 respirator mask and gloves.      Scribe remained outside the patient's room and did not have any contact with the patient for the duration of patient encounter.     REVIEW OF SYSTEMS  Pertinent positives include: itchy throat, body aches, runny nose, headache, lightheadedness, and cough.   Pertinent negatives include no: blood in cough, fever, chest pain, or  diarrhea  See HPI for further details.     PAST MEDICAL HISTORY   has a past medical history of Anxiety and Psychiatric disorder.    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   • Drug use: No      Social History     Substance and Sexual Activity   Drug Use No       SURGICAL HISTORY  History reviewed. No pertinent surgical history.    CURRENT MEDICATIONS  Home Medications    **Home medications have not yet been reviewed for this encounter**         ALLERGIES  No Known Allergies    PHYSICAL EXAM  VITAL SIGNS: /87   Pulse (!) 101   Temp 36.7 °C (98.1 °F) (Temporal)   Resp 16   Ht 1.829 m (6')   Wt 95.4 kg (210 lb 5.1 oz)   SpO2 97%   BMI 28.52 kg/m²      Constitutional: Well developed, well nourished; No acute distress; Non-toxic appearance.   HENT: Normocephalic, atraumatic; Bilateral external ears normal; . Mild erythema to posterior oropharynx  Eyes: PERRL, EOMI, Conjunctiva normal. No discharge.   Neck:  Supple, nontender midline; No stridor; No nuchal rigidity.   Lymphatic: Slightly enlarged anterior cervical lymph nodes  Cardiovascular: Regular rate and rhythm without murmurs, rubs, or gallop.   Thorax & Lungs: No respiratory distress, breath sounds clear to auscultation bilaterally without wheezing, rales or rhonchi. Nontender chest wall. No crepitus or subcutaneous air  Abdomen: Soft, nontender, bowel sounds normal. No obvious masses; No pulsatile masses; no rebound, guarding, or peritoneal signs.   Skin: Good color; warm and dry without rash or petechia.  Back: Nontender, No CVA tenderness.   Extremities: Distal radial, dorsalis pedis,  posterior tibial pulses are equal bilaterally; No edema; Nontender calves or saphenous, No cyanosis, No clubbing.   Musculoskeletal: Good range of motion in all major joints. No tenderness to palpation or major deformities noted.   Neurologic: Alert & oriented x 4, clear speech.     COURSE & MEDICAL DECISION MAKING  Pertinent Labs & Imaging studies reviewed. (See chart for details)    Results for orders placed or performed during the hospital encounter of 07/27/22   CoV-2, FLU A/B, and RSV by PCR (2-4 Hours CEPHEID) : Collect NP swab in [facility]    Specimen: Nasopharyngeal; Respirate   Result Value Ref Range    Influenza virus A RNA Negative Negative    Influenza virus B, PCR Negative Negative    RSV, PCR Negative Negative    SARS-CoV-2 by PCR DETECTED (AA)     SARS-CoV-2 Source NP Swab        7:49 PM - Patient seen and examined at bedside. Discussed plan of care, including obtaining COVID-19 test. Patient agrees to the plan of care. Ordered for COVID-19 test to evaluate his symptoms. Patient was instructed to quarantine. I discussed plan for discharge and follow up as outlined below. The patient is stable for discharge at this time and will return for any new or worsening symptoms. Patient verbalizes understanding and support with my plan for discharge.     Patient presents to the ER requesting a COVID test.  He developed scratchy throat, mild headache, dry cough, and a runny nose 3 days ago.  His boss just tested positive for COVID-19 and they were in close contact with each other.  Patient took 2 home tests today.  1 was negative and the other 1 was positive.  He comes here for confirmatory testing.  He is well-appearing.  His O2 sats are above 90%.  No shortness of breath.  No chest pain.  No hemoptysis.  Breath sounds are clear to auscultation.  He is not in any respiratory distress.  No major medical problems at baseline.  His vital signs are normal and stable.  He is not in any distress.  COVID-19 test was  obtained.  I told the patient he likely did have COVID especially since he had a positive home test.  Indeed, patient's COVID-19 test here today comes back positive.  He has been instructed to quarantine per CDC guidelines.  He is to treat himself symptomatically with over-the-counter cough and cold remedies.  He is to drink plenty of fluids.  At this time patient is safe and stable for outpatient management discharge home.  Is been given strict return precautions and discharge instructions and he understands treatment plan and follow-up.      The patient will return for new or worsening symptoms and is stable at the time of discharge.    The patient is referred to a primary physician for blood pressure management, diabetic screening, and for all other preventative health concerns.      DISPOSITION:  Patient will be discharged home in stable condition.    FOLLOW UP:  [facility] [address]  [address]  [place] [address]  [phone]  Schedule an appointment as soon as possible for a visit in 2 days      Trinidad Pro M.D.  [address]  [facility] [address]  [phone]    Schedule an appointment as soon as possible for a visit in 2 days        FINAL IMPRESSION  1. Viral syndrome Acute   2. Encounter for screening laboratory testing for COVID-19 virus Acute   3. COVID-19 Acute         Wolf BOYER (Jacinda), am scribing for, and in the presence of, Torie Barrett M.D..    Electronically signed by: Wolf Chiu (Jacinda), 7/27/2022    Torie BOYER M.D. personally performed the services described in this documentation, as scribed by Wolf Chiu in my presence, and it is both accurate and complete.    This dictation has been created using voice recognition software. The accuracy of the dictation is limited by the abilities of the software. I expect there may be some errors of grammar and possibly content. I made every attempt to manually correct  the errors within my dictation. However, errors related to voice recognition software may still exist and should be interpreted within the appropriate context.    The note accurately reflects work and decisions made by me.  Torie Barrett M.D.  7/27/2022  9:45 PM

## 2022-07-28 NOTE — ED NOTES
Pt signed and given d/c papers. Discussed f/u and OTC care for pain and fever. Reviewed resting and drinking plenty of fluids along w/ using goBramblet to view COVID results. Pt denies questions/concerns. Pt ambulated out of the dept w/ steady gait A&O x4

## 2022-07-28 NOTE — ED TRIAGE NOTES
Maxime Fernandez  36 y.o. male  Chief Complaint   Patient presents with   • Coronavirus Screening     Pt started having an itchy throat yesterday, then body aches and head aches today. He took two tests, one came out positive and one came out negative. Pt wants a definitive answer, says his boss is covid positive.      Covid swab ordered and sent    Vitals:    07/27/22 1931   BP: 133/87   Pulse: (!) 101   Resp: 16   Temp: 36.7 °C (98.1 °F)   SpO2: 97%       Triage process explained to patient, apologized for wait time, and returned to lobby.  Pt informed to notify staff of any change in condition.

## 2022-09-17 ENCOUNTER — OFFICE VISIT (OUTPATIENT)
Dept: URGENT CARE | Facility: CLINIC | Age: 37
End: 2022-09-17
Payer: MEDICAID

## 2022-09-17 VITALS
WEIGHT: 202 LBS | HEART RATE: 104 BPM | RESPIRATION RATE: 18 BRPM | BODY MASS INDEX: 27.36 KG/M2 | DIASTOLIC BLOOD PRESSURE: 78 MMHG | TEMPERATURE: 98 F | OXYGEN SATURATION: 100 % | HEIGHT: 72 IN | SYSTOLIC BLOOD PRESSURE: 124 MMHG

## 2022-09-17 DIAGNOSIS — R09.82 PND (POST-NASAL DRIP): ICD-10-CM

## 2022-09-17 PROCEDURE — 99213 OFFICE O/P EST LOW 20 MIN: CPT | Performed by: FAMILY MEDICINE

## 2022-09-17 RX ORDER — FEXOFENADINE HCL AND PSEUDOEPHEDRINE HCI 60; 120 MG/1; MG/1
1 TABLET, EXTENDED RELEASE ORAL 2 TIMES DAILY
Qty: 30 TABLET | Refills: 0 | Status: SHIPPED | OUTPATIENT
Start: 2022-09-17 | End: 2023-09-28

## 2022-09-17 RX ORDER — FLUTICASONE PROPIONATE 50 MCG
1 SPRAY, SUSPENSION (ML) NASAL 2 TIMES DAILY
Qty: 16 G | Refills: 0 | Status: SHIPPED | OUTPATIENT
Start: 2022-09-17 | End: 2022-09-22

## 2022-09-17 ASSESSMENT — FIBROSIS 4 INDEX: FIB4 SCORE: 0.79

## 2022-09-17 NOTE — PROGRESS NOTES
Chief Complaint   Patient presents with    Nasal Congestion     Body aches/head/face pressure/chills/cough/green phlegm/x2days         CC:  cough        Cough  This is a new problem. The current episode started 2 days ago. The problem has been unchanged. The problem occurs constantly. The cough is dry. Associated symptoms include : fatigue, muscle aches, but denies fever. Pertinent negatives include no   headaches, nausea, vomiting, diarrhea, sweats, weight loss or wheezing. Nothing aggravates the symptoms.  Patient has tried nothing for the symptoms. There is no history of asthma.        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    Drug use: No         Current Outpatient Medications on File Prior to Visit   Medication Sig Dispense Refill    oseltamivir (TAMIFLU) 75 MG Cap Take 1 Capsule by mouth 2 times a day. (Patient not taking: Reported on 9/17/2022) 10 Capsule 0    fluticasone (FLONASE) 50 MCG/ACT nasal spray Administer 1 Spray into affected nostril(S) every day. (Patient not taking: Reported on 9/17/2022) 15.8 mL 0     No current facility-administered medications on file prior to visit.                    Review of Systems    HENT: negative for otalgia  Cardiovascular - denies chest pain or dyspnea  Respiratory: Positive for cough.  .  Negative for wheezing.    Neurological: Negative for headaches.   GI - denies nausea, vomiting or diarrhea  Neuro - denies numbness or tingling.            Objective:     /78 (BP Location: Left arm, Patient Position: Sitting)   Pulse (!) 104   Temp 36.7 °C (98 °F) (Temporal)   Resp 18   Ht 1.829 m (6')   Wt 91.6 kg (202 lb)   SpO2 100%     Physical Exam   Constitutional: patient is oriented to person, place, and  time. Patient appears well-developed and well-nourished. No distress.   HENT:   Head: Normocephalic and atraumatic.   Right Ear: External ear normal.   Left Ear: External ear normal.   Nose: Mucosal edema  present. Right sinus exhibits no maxillary sinus tenderness. Left sinus exhibits no maxillary sinus tenderness.   Mouth/Throat: Mucous membranes are normal. No oral lesions.  No posterior pharyngeal erythema.  No oropharyngeal exudate or posterior oropharyngeal edema.   Eyes: Conjunctivae and EOM are normal. Pupils are equal, round, and reactive to light. Right eye exhibits no discharge. Left eye exhibits no discharge. No scleral icterus.   Neck: Normal range of motion. Neck supple. No tracheal deviation present.   Cardiovascular: Normal rate, regular rhythm and normal heart sounds.  Exam reveals no friction rub.    Pulmonary/Chest: Effort normal. No respiratory distress. Patient has no wheezes or rhonchi. Patient has no rales.    Musculoskeletal:  exhibits no edema.   Lymphadenopathy:     Patient has no cervical adenopathy.      Neurological: patient is alert and oriented to person, place, and time.   Skin: Skin is warm and dry. No rash noted. No erythema.   Psychiatric: patient  has a normal mood and affect.  behavior is normal.   Nursing note and vitals reviewed.              Assessment/Plan:      1. PND (post-nasal drip)     - fluticasone (FLONASE) 50 MCG/ACT nasal spray; Administer 1 Spray into affected nostril(S) 2 times a day for 7 days.  Dispense: 16 g; Refill: 0  - fexofenadine-pseudoephedrine (ALLEGRA-D)  MG per tablet; Take 1 Tablet by mouth 2 times a day.  Dispense: 30 Tablet; Refill: 0     Follow up in one week if no improvement

## 2022-09-19 PROBLEM — K21.9 GASTROESOPHAGEAL REFLUX DISEASE: Status: ACTIVE | Noted: 2020-05-07

## 2022-09-19 PROBLEM — F41.0 PANIC DISORDER: Status: ACTIVE | Noted: 2019-03-25

## 2022-09-19 PROBLEM — F43.12 POST-TRAUMATIC STRESS DISORDER, CHRONIC: Status: ACTIVE | Noted: 2019-02-11

## 2022-09-22 ENCOUNTER — OFFICE VISIT (OUTPATIENT)
Dept: URGENT CARE | Facility: CLINIC | Age: 37
End: 2022-09-22
Payer: MEDICAID

## 2022-09-22 VITALS
BODY MASS INDEX: 27.47 KG/M2 | WEIGHT: 202.8 LBS | RESPIRATION RATE: 16 BRPM | TEMPERATURE: 97.9 F | SYSTOLIC BLOOD PRESSURE: 124 MMHG | DIASTOLIC BLOOD PRESSURE: 78 MMHG | OXYGEN SATURATION: 96 % | HEART RATE: 87 BPM | HEIGHT: 72 IN

## 2022-09-22 DIAGNOSIS — H66.001 ACUTE SUPPURATIVE OTITIS MEDIA OF RIGHT EAR WITHOUT SPONTANEOUS RUPTURE OF TYMPANIC MEMBRANE, RECURRENCE NOT SPECIFIED: ICD-10-CM

## 2022-09-22 DIAGNOSIS — J40 WHEEZY BRONCHITIS: ICD-10-CM

## 2022-09-22 PROCEDURE — 99213 OFFICE O/P EST LOW 20 MIN: CPT | Performed by: FAMILY MEDICINE

## 2022-09-22 RX ORDER — AMOXICILLIN AND CLAVULANATE POTASSIUM 875; 125 MG/1; MG/1
1 TABLET, FILM COATED ORAL 2 TIMES DAILY
Qty: 14 TABLET | Refills: 0 | Status: SHIPPED | OUTPATIENT
Start: 2022-09-22 | End: 2022-09-29

## 2022-09-22 RX ORDER — ALBUTEROL SULFATE 90 UG/1
2 AEROSOL, METERED RESPIRATORY (INHALATION) EVERY 6 HOURS PRN
Qty: 8.5 G | Refills: 3 | Status: SHIPPED | OUTPATIENT
Start: 2022-09-22 | End: 2023-09-28

## 2022-09-22 RX ORDER — DEXAMETHASONE 6 MG/1
6 TABLET ORAL DAILY
Qty: 5 TABLET | Refills: 0 | Status: SHIPPED | OUTPATIENT
Start: 2022-09-22 | End: 2022-09-27

## 2022-09-22 RX ORDER — DEXTROMETHORPHAN HYDROBROMIDE AND PROMETHAZINE HYDROCHLORIDE 15; 6.25 MG/5ML; MG/5ML
5 SYRUP ORAL EVERY 6 HOURS PRN
Qty: 120 ML | Refills: 0 | Status: SHIPPED | OUTPATIENT
Start: 2022-09-22 | End: 2023-09-28

## 2022-09-22 ASSESSMENT — FIBROSIS 4 INDEX: FIB4 SCORE: 0.79

## 2022-09-22 ASSESSMENT — ENCOUNTER SYMPTOMS: COUGH: 1

## 2022-09-22 NOTE — PROGRESS NOTES
Subjective     Maxime Fernandez is a 36 y.o. male who presents with Cough (Right ear pain x 5 days )      - This is a pleasant and nontoxic appearing 36 y.o. male who has come to the walk-in clinic today for:    #1) 1wk cough and stuffy nose and Rt ear pain now. No associated NVFC/cp/sob. Otc meds not helping       ALLERGIES:  Patient has no known allergies.     PMH:  Past Medical History:   Diagnosis Date    Anxiety     Psychiatric disorder     anxiety         PSH:  History reviewed. No pertinent surgical history.    MEDS:    Current Outpatient Medications:     amoxicillin-clavulanate (AUGMENTIN) 875-125 MG Tab, Take 1 Tablet by mouth 2 times a day for 7 days., Disp: 14 Tablet, Rfl: 0    dexamethasone (DECADRON) 6 MG Tab, Take 1 Tablet by mouth every day for 5 days., Disp: 5 Tablet, Rfl: 0    albuterol 108 (90 Base) MCG/ACT Aero Soln inhalation aerosol, Inhale 2 Puffs every 6 hours as needed for Shortness of Breath., Disp: 8.5 g, Rfl: 3    promethazine-dextromethorphan (PROMETHAZINE-DM) 6.25-15 MG/5ML syrup, Take 5 mL by mouth every 6 hours as needed for Cough., Disp: 120 mL, Rfl: 0    fexofenadine-pseudoephedrine (ALLEGRA-D)  MG per tablet, Take 1 Tablet by mouth 2 times a day., Disp: 30 Tablet, Rfl: 0    ** I have documented what I find to be significant in regards to past medical, social, family and surgical history  in my HPI or under PMH/PSH/FH review section, otherwise it is noncontributory **           HPI    Review of Systems   HENT:  Positive for congestion and ear pain.    Respiratory:  Positive for cough.    All other systems reviewed and are negative.           Objective     /78   Pulse 87   Temp 36.6 °C (97.9 °F) (Temporal)   Resp 16   Ht 1.829 m (6')   Wt 92 kg (202 lb 12.8 oz)   SpO2 96%   BMI 27.50 kg/m²      Physical Exam  Vitals and nursing note reviewed.   Constitutional:       General: He is not in acute distress.     Appearance: Normal appearance. He is well-developed.    HENT:      Head: Normocephalic.      Right Ear: Ear canal and external ear normal.      Left Ear: Ear canal and external ear normal.      Ears:      Comments: Rt TM red/dull/full     Nose: Congestion present.      Mouth/Throat:      Mouth: Mucous membranes are moist.      Pharynx: Oropharynx is clear.   Cardiovascular:      Heart sounds: Normal heart sounds. No murmur heard.  Pulmonary:      Effort: Pulmonary effort is normal. No respiratory distress.      Breath sounds: Wheezing (faint exp wheeze) present.   Neurological:      Mental Status: He is alert.      Motor: No abnormal muscle tone.   Psychiatric:         Mood and Affect: Mood normal.         Behavior: Behavior normal.                           Assessment & Plan       1. Acute suppurative otitis media of right ear without spontaneous rupture of tympanic membrane, recurrence not specified  amoxicillin-clavulanate (AUGMENTIN) 875-125 MG Tab    Referral to establish with [facility] PCP      2. Wheezy bronchitis  dexamethasone (DECADRON) 6 MG Tab    albuterol 108 (90 Base) MCG/ACT Aero Soln inhalation aerosol    promethazine-dextromethorphan (PROMETHAZINE-DM) 6.25-15 MG/5ML syrup    [facility]erral [facility]          - Dx, plan & d/c instructions discussed   - hot tea w/ honey 3-4x/day  - Rest, stay hydrated  - E.R. precautions discussed     Follow up with your regular primary care providers office for a recheck on today's visit. ER if not improving in 2-3 days or if feeling/getting worse. (If you do not have a primary care provider and need to schedule one you may call [facility] at [phone] to do this).    Any realistic side effects of medications that may have been given today reviewed.     Patient left in stable condition       Pertinent prior office visit notes in [facility] have been reviewed by me today on day of this visit.

## 2022-09-22 NOTE — LETTER
September 22, 2022         Patient: Maxime Fernandez   YOB: 1985   Date of Visit: 9/22/2022           To Whom it May Concern:    Maxime Fernandez was seen in my clinic on 9/22/2022. He may return to work in 1-2 days.    If you have any questions or concerns, please don't hesitate to call.        Sincerely,           Travis Bates M.D.  Electronically Signed

## 2022-09-23 ENCOUNTER — TELEPHONE (OUTPATIENT)
Dept: SCHEDULING | Facility: IMAGING CENTER | Age: 37
End: 2022-09-23
Payer: MEDICAID

## 2023-01-02 ENCOUNTER — OFFICE VISIT (OUTPATIENT)
Dept: URGENT CARE | Facility: CLINIC | Age: 38
End: 2023-01-02
Payer: MEDICAID

## 2023-01-02 VITALS
TEMPERATURE: 97.4 F | HEART RATE: 82 BPM | BODY MASS INDEX: 28.94 KG/M2 | RESPIRATION RATE: 12 BRPM | DIASTOLIC BLOOD PRESSURE: 84 MMHG | SYSTOLIC BLOOD PRESSURE: 116 MMHG | WEIGHT: 206.7 LBS | OXYGEN SATURATION: 97 % | HEIGHT: 71 IN

## 2023-01-02 DIAGNOSIS — J02.0 PHARYNGITIS DUE TO STREPTOCOCCUS SPECIES: ICD-10-CM

## 2023-01-02 DIAGNOSIS — H10.33 ACUTE BACTERIAL CONJUNCTIVITIS OF BOTH EYES: ICD-10-CM

## 2023-01-02 LAB
INT CON NEG: NORMAL
INT CON POS: NORMAL
S PYO AG THROAT QL: POSITIVE

## 2023-01-02 PROCEDURE — 87880 STREP A ASSAY W/OPTIC: CPT | Performed by: FAMILY MEDICINE

## 2023-01-02 PROCEDURE — 99213 OFFICE O/P EST LOW 20 MIN: CPT | Performed by: FAMILY MEDICINE

## 2023-01-02 RX ORDER — CIPROFLOXACIN HYDROCHLORIDE 3.5 MG/ML
1 SOLUTION/ DROPS TOPICAL EVERY 4 HOURS
Qty: 5 ML | Refills: 0 | Status: SHIPPED | OUTPATIENT
Start: 2023-01-02 | End: 2023-01-09

## 2023-01-02 RX ORDER — AMOXICILLIN 500 MG/1
500 CAPSULE ORAL 2 TIMES DAILY
Qty: 20 CAPSULE | Refills: 0 | Status: SHIPPED | OUTPATIENT
Start: 2023-01-02 | End: 2023-01-12

## 2023-01-02 ASSESSMENT — ENCOUNTER SYMPTOMS
FEVER: 0
CHILLS: 0
DIZZINESS: 0
EYE DISCHARGE: 1
EYE REDNESS: 1
SORE THROAT: 1
SHORTNESS OF BREATH: 0
NAUSEA: 0
VOMITING: 0
COUGH: 0
MYALGIAS: 0

## 2023-01-02 ASSESSMENT — VISUAL ACUITY: OU: 1

## 2023-01-02 ASSESSMENT — FIBROSIS 4 INDEX: FIB4 SCORE: 0.81

## 2023-01-03 NOTE — PROGRESS NOTES
Subjective:   Maxime Fernandez is a 37 y.o. male who presents for Eye Problem (X 3 days eye goop. X 1 day both eyes red, left one more red) and Sore Throat (X 3 days, productive cough, congestion)        Eye Problem   Both (Reports bilateral eye redness over the past 3 days) eyes are affected. This is a new problem. Episode onset: 3 days. The problem has been unchanged. The injury mechanism was contact lenses. He Wears contacts (Has kept contacts out since onset of symptoms). Associated symptoms include an eye discharge and eye redness. Pertinent negatives include no fever, nausea or vomiting. Associated symptoms comments: Reports sore throat, cough and congestion.   PMH:  has a past medical history of Anxiety and Psychiatric disorder.  MEDS:   Current Outpatient Medications:     Fluticasone Propionate (FLONASE NA), Administer  into affected nostril(S)., Disp: , Rfl:     Esomeprazole Magnesium (NEXIUM PO), Take  by mouth., Disp: , Rfl:     ciprofloxacin (CILOXIN) 0.3 % Solution, Administer 1 Drop into both eyes every 4 hours for 7 days., Disp: 5 mL, Rfl: 0    amoxicillin (AMOXIL) 500 MG Cap, Take 1 Capsule by mouth 2 times a day for 10 days., Disp: 20 Capsule, Rfl: 0    albuterol 108 (90 Base) MCG/ACT Aero Soln inhalation aerosol, Inhale 2 Puffs every 6 hours as needed for Shortness of Breath. (Patient not taking: Reported on 1/2/2023), Disp: 8.5 g, Rfl: 3    promethazine-dextromethorphan (PROMETHAZINE-DM) 6.25-15 MG/5ML syrup, Take 5 mL by mouth every 6 hours as needed for Cough. (Patient not taking: Reported on 1/2/2023), Disp: 120 mL, Rfl: 0    fexofenadine-pseudoephedrine (ALLEGRA-D)  MG per tablet, Take 1 Tablet by mouth 2 times a day. (Patient not taking: Reported on 1/2/2023), Disp: 30 Tablet, Rfl: 0  ALLERGIES: No Known Allergies  SURGHX: No past surgical history on file.  SOCHX:  reports that he has never smoked. He has never used smokeless tobacco. He reports that he does not currently use  "alcohol. He reports that he does not use drugs.  FH:   Family History   Problem Relation Age of Onset    Thyroid Mother     Hyperlipidemia Father     No Known Problems Sister     No Known Problems Brother      Review of Systems   Constitutional:  Negative for chills and fever.   HENT:  Positive for congestion and sore throat.    Eyes:  Positive for discharge and redness.   Respiratory:  Negative for cough and shortness of breath.    Gastrointestinal:  Negative for nausea and vomiting.   Musculoskeletal:  Negative for myalgias.   Skin:  Negative for rash.   Neurological:  Negative for dizziness.      Objective:   /84 (BP Location: Right arm, Patient Position: Sitting, BP Cuff Size: Large adult long)   Pulse 82   Temp 36.3 °C (97.4 °F) (Temporal)   Resp 12   Ht 1.793 m (5' 10.59\")   Wt 93.8 kg (206 lb 11.2 oz)   SpO2 97%   BMI 29.16 kg/m²   Physical Exam  Vitals and nursing note reviewed.   Constitutional:       General: He is not in acute distress.     Appearance: He is well-developed.   HENT:      Head: Normocephalic and atraumatic.      Right Ear: External ear normal.      Left Ear: External ear normal.      Nose: Rhinorrhea present.      Mouth/Throat:      Mouth: Mucous membranes are moist.      Pharynx: Posterior oropharyngeal erythema present.   Eyes:      General: Lids are normal. Vision grossly intact. Gaze aligned appropriately.         Right eye: Discharge present. No foreign body.         Left eye: Discharge present.No foreign body.      Extraocular Movements: Extraocular movements intact.      Conjunctiva/sclera:      Right eye: Right conjunctiva is injected. Exudate present.      Left eye: Left conjunctiva is injected. Exudate present.      Pupils: Pupils are equal, round, and reactive to light.   Cardiovascular:      Rate and Rhythm: Normal rate.   Pulmonary:      Effort: Pulmonary effort is normal. No respiratory distress.      Breath sounds: Normal breath sounds. No wheezing or rhonchi. "   Abdominal:      General: There is no distension.   Musculoskeletal:         General: Normal range of motion.   Skin:     General: Skin is warm and dry.   Neurological:      General: No focal deficit present.      Mental Status: He is alert and oriented to person, place, and time. Mental status is at baseline.      Gait: Gait (gait at baseline) normal.   Psychiatric:         Judgment: Judgment normal.     Point-of-care rapid strep: positive          Assessment/Plan:   1. Pharyngitis due to Streptococcus species  - POCT Rapid Strep A  - amoxicillin (AMOXIL) 500 MG Cap; Take 1 Capsule by mouth 2 times a day for 10 days.  Dispense: 20 Capsule; Refill: 0    2. Acute bacterial conjunctivitis of both eyes  - ciprofloxacin (CILOXIN) 0.3 % Solution; Administer 1 Drop into both eyes every 4 hours for 7 days.  Dispense: 5 mL; Refill: 0    Other orders  - Fluticasone Propionate (FLONASE NA); Administer  into affected nostril(S).  - Esomeprazole Magnesium (NEXIUM PO); Take  by mouth.        Medical Decision Making/Course:  In the course of preparing for this visit with review of the pertinent past medical history, recent and past clinic visits, current medications, and performing chart, immunization, medical history and medication reconciliation, and in the further course of obtaining the current history pertinent to the clinic visit today, performing an exam and evaluation, ordering and independently evaluating labs, tests including point-of-care rapid strep testing which is positive, and/or procedures, prescribing any recommended new medications as noted above, providing any pertinent counseling and education and recommending further coordination of care including recommendations for behavioral modification including keeping the contacts for 1 week and recommendations for symptomatic and supportive measures and to return for any persistent or worsening symptoms, at least  24 minutes of total time were spent during this  encounter.      Discussed close monitoring, return precautions, and supportive measures of maintaining adequate fluid hydration and caloric intake, relative rest and symptom management as needed for pain and/or fever.    Differential diagnosis, natural history, supportive care, and indications for immediate follow-up discussed.     Advised the patient to follow-up with the primary care physician for recheck, reevaluation, and consideration of further management.    Please note that this dictation was created using voice recognition software. I have worked with consultants from the vendor as well as technical experts from [employer] to optimize the interface. I have made every reasonable attempt to correct obvious errors, but I expect that there are errors of grammar and possibly content that I did not discover before finalizing the note.

## 2023-09-28 ENCOUNTER — HOSPITAL ENCOUNTER (INPATIENT)
Facility: MEDICAL CENTER | Age: 38
LOS: 3 days | DRG: 897 | End: 2023-10-01
Attending: STUDENT IN AN ORGANIZED HEALTH CARE EDUCATION/TRAINING PROGRAM | Admitting: STUDENT IN AN ORGANIZED HEALTH CARE EDUCATION/TRAINING PROGRAM
Payer: COMMERCIAL

## 2023-09-28 DIAGNOSIS — F10.932: ICD-10-CM

## 2023-09-28 DIAGNOSIS — E86.0 DEHYDRATION: ICD-10-CM

## 2023-09-28 DIAGNOSIS — F10.932 ALCOHOL WITHDRAWAL SYNDROME WITH PERCEPTUAL DISTURBANCE (HCC): ICD-10-CM

## 2023-09-28 DIAGNOSIS — E87.29 ALCOHOLIC KETOACIDOSIS: ICD-10-CM

## 2023-09-28 LAB
ALBUMIN SERPL BCP-MCNC: 4.4 G/DL (ref 3.2–4.9)
ALBUMIN/GLOB SERPL: 1.6 G/DL
ALP SERPL-CCNC: 78 U/L (ref 30–99)
ALT SERPL-CCNC: 125 U/L (ref 2–50)
ANION GAP SERPL CALC-SCNC: 23 MMOL/L (ref 7–16)
AST SERPL-CCNC: 164 U/L (ref 12–45)
BASOPHILS # BLD AUTO: 0.3 % (ref 0–1.8)
BASOPHILS # BLD: 0.02 K/UL (ref 0–0.12)
BILIRUB SERPL-MCNC: 0.8 MG/DL (ref 0.1–1.5)
BUN SERPL-MCNC: 16 MG/DL (ref 8–22)
CALCIUM ALBUM COR SERPL-MCNC: 7.8 MG/DL (ref 8.5–10.5)
CALCIUM SERPL-MCNC: 8.1 MG/DL (ref 8.5–10.5)
CHLORIDE SERPL-SCNC: 91 MMOL/L (ref 96–112)
CO2 SERPL-SCNC: 19 MMOL/L (ref 20–33)
CREAT SERPL-MCNC: 0.88 MG/DL (ref 0.5–1.4)
EKG IMPRESSION: NORMAL
EOSINOPHIL # BLD AUTO: 0 K/UL (ref 0–0.51)
EOSINOPHIL NFR BLD: 0 % (ref 0–6.9)
ERYTHROCYTE [DISTWIDTH] IN BLOOD BY AUTOMATED COUNT: 37.4 FL (ref 35.9–50)
GFR SERPLBLD CREATININE-BSD FMLA CKD-EPI: 112 ML/MIN/1.73 M 2
GLOBULIN SER CALC-MCNC: 2.8 G/DL (ref 1.9–3.5)
GLUCOSE SERPL-MCNC: 127 MG/DL (ref 65–99)
HCT VFR BLD AUTO: 45.8 % (ref 42–52)
HGB BLD-MCNC: 17.1 G/DL (ref 14–18)
IMM GRANULOCYTES # BLD AUTO: 0.05 K/UL (ref 0–0.11)
IMM GRANULOCYTES NFR BLD AUTO: 0.6 % (ref 0–0.9)
LYMPHOCYTES # BLD AUTO: 0.61 K/UL (ref 1–4.8)
LYMPHOCYTES NFR BLD: 7.6 % (ref 22–41)
MCH RBC QN AUTO: 31 PG (ref 27–33)
MCHC RBC AUTO-ENTMCNC: 37.3 G/DL (ref 32.3–36.5)
MCV RBC AUTO: 83 FL (ref 81.4–97.8)
MONOCYTES # BLD AUTO: 0.34 K/UL (ref 0–0.85)
MONOCYTES NFR BLD AUTO: 4.3 % (ref 0–13.4)
NEUTROPHILS # BLD AUTO: 6.97 K/UL (ref 1.82–7.42)
NEUTROPHILS NFR BLD: 87.2 % (ref 44–72)
NRBC # BLD AUTO: 0 K/UL
NRBC BLD-RTO: 0 /100 WBC (ref 0–0.2)
PLATELET # BLD AUTO: 168 K/UL (ref 164–446)
PMV BLD AUTO: 10.6 FL (ref 9–12.9)
POTASSIUM SERPL-SCNC: 3.1 MMOL/L (ref 3.6–5.5)
PROT SERPL-MCNC: 7.2 G/DL (ref 6–8.2)
RBC # BLD AUTO: 5.52 M/UL (ref 4.7–6.1)
SODIUM SERPL-SCNC: 133 MMOL/L (ref 135–145)
WBC # BLD AUTO: 8 K/UL (ref 4.8–10.8)

## 2023-09-28 PROCEDURE — 84100 ASSAY OF PHOSPHORUS: CPT

## 2023-09-28 PROCEDURE — HZ2ZZZZ DETOXIFICATION SERVICES FOR SUBSTANCE ABUSE TREATMENT: ICD-10-PCS

## 2023-09-28 PROCEDURE — 83735 ASSAY OF MAGNESIUM: CPT

## 2023-09-28 PROCEDURE — 80053 COMPREHEN METABOLIC PANEL: CPT

## 2023-09-28 PROCEDURE — 99285 EMERGENCY DEPT VISIT HI MDM: CPT

## 2023-09-28 PROCEDURE — 85025 COMPLETE CBC W/AUTO DIFF WBC: CPT

## 2023-09-28 PROCEDURE — 700111 HCHG RX REV CODE 636 W/ 250 OVERRIDE (IP): Mod: JZ,UD | Performed by: STUDENT IN AN ORGANIZED HEALTH CARE EDUCATION/TRAINING PROGRAM

## 2023-09-28 PROCEDURE — A9270 NON-COVERED ITEM OR SERVICE: HCPCS | Mod: JZ | Performed by: STUDENT IN AN ORGANIZED HEALTH CARE EDUCATION/TRAINING PROGRAM

## 2023-09-28 PROCEDURE — 96367 TX/PROPH/DG ADDL SEQ IV INF: CPT

## 2023-09-28 PROCEDURE — 36415 COLL VENOUS BLD VENIPUNCTURE: CPT

## 2023-09-28 PROCEDURE — 99223 1ST HOSP IP/OBS HIGH 75: CPT | Performed by: STUDENT IN AN ORGANIZED HEALTH CARE EDUCATION/TRAINING PROGRAM

## 2023-09-28 PROCEDURE — 93005 ELECTROCARDIOGRAM TRACING: CPT | Performed by: STUDENT IN AN ORGANIZED HEALTH CARE EDUCATION/TRAINING PROGRAM

## 2023-09-28 PROCEDURE — 96375 TX/PRO/DX INJ NEW DRUG ADDON: CPT

## 2023-09-28 PROCEDURE — 700105 HCHG RX REV CODE 258: Performed by: STUDENT IN AN ORGANIZED HEALTH CARE EDUCATION/TRAINING PROGRAM

## 2023-09-28 PROCEDURE — 96365 THER/PROPH/DIAG IV INF INIT: CPT

## 2023-09-28 PROCEDURE — 700102 HCHG RX REV CODE 250 W/ 637 OVERRIDE(OP): Mod: JZ | Performed by: STUDENT IN AN ORGANIZED HEALTH CARE EDUCATION/TRAINING PROGRAM

## 2023-09-28 PROCEDURE — 770020 HCHG ROOM/CARE - TELE (206)

## 2023-09-28 RX ORDER — LABETALOL 100 MG/1
200 TABLET, FILM COATED ORAL EVERY 6 HOURS PRN
Status: DISCONTINUED | OUTPATIENT
Start: 2023-09-28 | End: 2023-10-01 | Stop reason: HOSPADM

## 2023-09-28 RX ORDER — LORAZEPAM 2 MG/ML
1.5 INJECTION INTRAMUSCULAR
Status: DISCONTINUED | OUTPATIENT
Start: 2023-09-28 | End: 2023-10-01 | Stop reason: HOSPADM

## 2023-09-28 RX ORDER — CHLORDIAZEPOXIDE HYDROCHLORIDE 25 MG/1
50 CAPSULE, GELATIN COATED ORAL EVERY 6 HOURS
Status: DISCONTINUED | OUTPATIENT
Start: 2023-09-29 | End: 2023-09-29

## 2023-09-28 RX ORDER — LORAZEPAM 2 MG/1
2 TABLET ORAL
Status: DISCONTINUED | OUTPATIENT
Start: 2023-09-28 | End: 2023-10-01 | Stop reason: HOSPADM

## 2023-09-28 RX ORDER — PHENOBARBITAL SODIUM 130 MG/ML
520 INJECTION, SOLUTION INTRAMUSCULAR; INTRAVENOUS ONCE
Status: DISCONTINUED | OUTPATIENT
Start: 2023-09-28 | End: 2023-09-28

## 2023-09-28 RX ORDER — LORAZEPAM 2 MG/ML
0.5 INJECTION INTRAMUSCULAR EVERY 4 HOURS PRN
Status: DISCONTINUED | OUTPATIENT
Start: 2023-09-28 | End: 2023-10-01 | Stop reason: HOSPADM

## 2023-09-28 RX ORDER — LORAZEPAM 1 MG/1
1 TABLET ORAL EVERY 4 HOURS PRN
Status: DISCONTINUED | OUTPATIENT
Start: 2023-09-28 | End: 2023-10-01 | Stop reason: HOSPADM

## 2023-09-28 RX ORDER — DEXTROSE AND SODIUM CHLORIDE 5; .9 G/100ML; G/100ML
INJECTION, SOLUTION INTRAVENOUS CONTINUOUS
Status: DISCONTINUED | OUTPATIENT
Start: 2023-09-28 | End: 2023-09-30

## 2023-09-28 RX ORDER — ONDANSETRON 2 MG/ML
4 INJECTION INTRAMUSCULAR; INTRAVENOUS ONCE
Status: COMPLETED | OUTPATIENT
Start: 2023-09-28 | End: 2023-09-28

## 2023-09-28 RX ORDER — LORAZEPAM 2 MG/ML
2 INJECTION INTRAMUSCULAR
Status: DISCONTINUED | OUTPATIENT
Start: 2023-09-28 | End: 2023-10-01 | Stop reason: HOSPADM

## 2023-09-28 RX ORDER — LABETALOL HYDROCHLORIDE 5 MG/ML
10 INJECTION, SOLUTION INTRAVENOUS
Status: DISCONTINUED | OUTPATIENT
Start: 2023-09-28 | End: 2023-10-01 | Stop reason: HOSPADM

## 2023-09-28 RX ORDER — LORAZEPAM 2 MG/ML
1 INJECTION INTRAMUSCULAR
Status: DISCONTINUED | OUTPATIENT
Start: 2023-09-28 | End: 2023-10-01 | Stop reason: HOSPADM

## 2023-09-28 RX ORDER — PHENOBARBITAL SODIUM 130 MG/ML
260 INJECTION, SOLUTION INTRAMUSCULAR; INTRAVENOUS ONCE
Status: COMPLETED | OUTPATIENT
Start: 2023-09-28 | End: 2023-09-28

## 2023-09-28 RX ORDER — SODIUM CHLORIDE, SODIUM LACTATE, POTASSIUM CHLORIDE, CALCIUM CHLORIDE 600; 310; 30; 20 MG/100ML; MG/100ML; MG/100ML; MG/100ML
1000 INJECTION, SOLUTION INTRAVENOUS ONCE
Status: COMPLETED | OUTPATIENT
Start: 2023-09-28 | End: 2023-09-28

## 2023-09-28 RX ORDER — POTASSIUM CHLORIDE 20 MEQ/1
40 TABLET, EXTENDED RELEASE ORAL ONCE
Status: COMPLETED | OUTPATIENT
Start: 2023-09-28 | End: 2023-09-28

## 2023-09-28 RX ORDER — CHLORDIAZEPOXIDE HYDROCHLORIDE 25 MG/1
25 CAPSULE, GELATIN COATED ORAL EVERY 6 HOURS
Status: DISCONTINUED | OUTPATIENT
Start: 2023-09-30 | End: 2023-09-29

## 2023-09-28 RX ORDER — LORAZEPAM 2 MG/1
4 TABLET ORAL
Status: DISCONTINUED | OUTPATIENT
Start: 2023-09-28 | End: 2023-10-01 | Stop reason: HOSPADM

## 2023-09-28 RX ORDER — LORAZEPAM 0.5 MG/1
0.5 TABLET ORAL EVERY 4 HOURS PRN
Status: DISCONTINUED | OUTPATIENT
Start: 2023-09-28 | End: 2023-10-01 | Stop reason: HOSPADM

## 2023-09-28 RX ADMIN — POTASSIUM CHLORIDE 40 MEQ: 1500 TABLET, EXTENDED RELEASE ORAL at 22:07

## 2023-09-28 RX ADMIN — THIAMINE HYDROCHLORIDE 100 MG: 100 INJECTION, SOLUTION INTRAMUSCULAR; INTRAVENOUS at 18:50

## 2023-09-28 RX ADMIN — PHENOBARBITAL SODIUM 520 MG: 130 INJECTION INTRAMUSCULAR; INTRAVENOUS at 20:00

## 2023-09-28 RX ADMIN — SODIUM CHLORIDE, POTASSIUM CHLORIDE, SODIUM LACTATE AND CALCIUM CHLORIDE 1000 ML: 600; 310; 30; 20 INJECTION, SOLUTION INTRAVENOUS at 18:53

## 2023-09-28 RX ADMIN — ONDANSETRON 4 MG: 2 INJECTION INTRAMUSCULAR; INTRAVENOUS at 18:50

## 2023-09-28 RX ADMIN — PHENOBARBITAL SODIUM 260 MG: 130 INJECTION INTRAMUSCULAR; INTRAVENOUS at 22:00

## 2023-09-28 RX ADMIN — DEXTROSE AND SODIUM CHLORIDE: 5; 900 INJECTION, SOLUTION INTRAVENOUS at 22:03

## 2023-09-28 ASSESSMENT — LIFESTYLE VARIABLES
AUDITORY DISTURBANCES: NOT PRESENT
ORIENTATION AND CLOUDING OF SENSORIUM: ORIENTED AND CAN DO SERIAL ADDITIONS
PAROXYSMAL SWEATS: *
NAUSEA AND VOMITING: *
HEADACHE, FULLNESS IN HEAD: NOT PRESENT
VISUAL DISTURBANCES: NOT PRESENT
PAROXYSMAL SWEATS: BARELY PERCEPTIBLE SWEATING. PALMS MOIST
VISUAL DISTURBANCES: NOT PRESENT
TREMOR: TREMOR NOT VISIBLE BUT CAN BE FELT, FINGERTIP TO FINGERTIP
AGITATION: SOMEWHAT MORE THAN NORMAL ACTIVITY
ANXIETY: MODERATELY ANXIOUS OR GUARDED, SO ANXIETY IS INFERRED
AUDITORY DISTURBANCES: NOT PRESENT
TOTAL SCORE: 9
TREMOR: *
AGITATION: SOMEWHAT MORE THAN NORMAL ACTIVITY
ANXIETY: *
NAUSEA AND VOMITING: *
TOTAL SCORE: 12
ORIENTATION AND CLOUDING OF SENSORIUM: ORIENTED AND CAN DO SERIAL ADDITIONS
HEADACHE, FULLNESS IN HEAD: NOT PRESENT

## 2023-09-28 ASSESSMENT — FIBROSIS 4 INDEX: FIB4 SCORE: 0.81

## 2023-09-29 PROBLEM — E87.29 HIGH ANION GAP METABOLIC ACIDOSIS: Status: ACTIVE | Noted: 2023-09-29

## 2023-09-29 LAB
ALBUMIN SERPL BCP-MCNC: 3.8 G/DL (ref 3.2–4.9)
ALBUMIN SERPL BCP-MCNC: 3.9 G/DL (ref 3.2–4.9)
ALBUMIN/GLOB SERPL: 1.6 G/DL
ALBUMIN/GLOB SERPL: 1.7 G/DL
ALP SERPL-CCNC: 67 U/L (ref 30–99)
ALP SERPL-CCNC: 81 U/L (ref 30–99)
ALT SERPL-CCNC: 128 U/L (ref 2–50)
ALT SERPL-CCNC: 148 U/L (ref 2–50)
ANION GAP SERPL CALC-SCNC: 12 MMOL/L (ref 7–16)
ANION GAP SERPL CALC-SCNC: 9 MMOL/L (ref 7–16)
AST SERPL-CCNC: 183 U/L (ref 12–45)
AST SERPL-CCNC: 208 U/L (ref 12–45)
BILIRUB SERPL-MCNC: 1.1 MG/DL (ref 0.1–1.5)
BILIRUB SERPL-MCNC: 1.3 MG/DL (ref 0.1–1.5)
BUN SERPL-MCNC: 10 MG/DL (ref 8–22)
BUN SERPL-MCNC: 12 MG/DL (ref 8–22)
CALCIUM ALBUM COR SERPL-MCNC: 7.8 MG/DL (ref 8.5–10.5)
CALCIUM ALBUM COR SERPL-MCNC: 8 MG/DL (ref 8.5–10.5)
CALCIUM SERPL-MCNC: 7.7 MG/DL (ref 8.5–10.5)
CALCIUM SERPL-MCNC: 7.8 MG/DL (ref 8.5–10.5)
CHLORIDE SERPL-SCNC: 94 MMOL/L (ref 96–112)
CHLORIDE SERPL-SCNC: 97 MMOL/L (ref 96–112)
CO2 SERPL-SCNC: 25 MMOL/L (ref 20–33)
CO2 SERPL-SCNC: 28 MMOL/L (ref 20–33)
CREAT SERPL-MCNC: 0.81 MG/DL (ref 0.5–1.4)
CREAT SERPL-MCNC: 0.86 MG/DL (ref 0.5–1.4)
GFR SERPLBLD CREATININE-BSD FMLA CKD-EPI: 113 ML/MIN/1.73 M 2
GFR SERPLBLD CREATININE-BSD FMLA CKD-EPI: 115 ML/MIN/1.73 M 2
GLOBULIN SER CALC-MCNC: 2.2 G/DL (ref 1.9–3.5)
GLOBULIN SER CALC-MCNC: 2.4 G/DL (ref 1.9–3.5)
GLUCOSE SERPL-MCNC: 121 MG/DL (ref 65–99)
GLUCOSE SERPL-MCNC: 125 MG/DL (ref 65–99)
HAV IGM SERPL QL IA: NORMAL
HBV CORE IGM SER QL: NORMAL
HBV SURFACE AG SER QL: NORMAL
HCV AB SER QL: NORMAL
MAGNESIUM SERPL-MCNC: 1.4 MG/DL (ref 1.5–2.5)
MAGNESIUM SERPL-MCNC: 1.8 MG/DL (ref 1.5–2.5)
PHOSPHATE SERPL-MCNC: 2.3 MG/DL (ref 2.5–4.5)
PHOSPHATE SERPL-MCNC: 3.1 MG/DL (ref 2.5–4.5)
POTASSIUM SERPL-SCNC: 2.9 MMOL/L (ref 3.6–5.5)
POTASSIUM SERPL-SCNC: 3.2 MMOL/L (ref 3.6–5.5)
PROT SERPL-MCNC: 6 G/DL (ref 6–8.2)
PROT SERPL-MCNC: 6.3 G/DL (ref 6–8.2)
SODIUM SERPL-SCNC: 131 MMOL/L (ref 135–145)
SODIUM SERPL-SCNC: 134 MMOL/L (ref 135–145)

## 2023-09-29 PROCEDURE — 84100 ASSAY OF PHOSPHORUS: CPT

## 2023-09-29 PROCEDURE — 83735 ASSAY OF MAGNESIUM: CPT

## 2023-09-29 PROCEDURE — A9270 NON-COVERED ITEM OR SERVICE: HCPCS | Performed by: STUDENT IN AN ORGANIZED HEALTH CARE EDUCATION/TRAINING PROGRAM

## 2023-09-29 PROCEDURE — 700102 HCHG RX REV CODE 250 W/ 637 OVERRIDE(OP): Performed by: STUDENT IN AN ORGANIZED HEALTH CARE EDUCATION/TRAINING PROGRAM

## 2023-09-29 PROCEDURE — 80053 COMPREHEN METABOLIC PANEL: CPT

## 2023-09-29 PROCEDURE — 36415 COLL VENOUS BLD VENIPUNCTURE: CPT

## 2023-09-29 PROCEDURE — 770020 HCHG ROOM/CARE - TELE (206)

## 2023-09-29 PROCEDURE — 99233 SBSQ HOSP IP/OBS HIGH 50: CPT | Performed by: STUDENT IN AN ORGANIZED HEALTH CARE EDUCATION/TRAINING PROGRAM

## 2023-09-29 PROCEDURE — 80074 ACUTE HEPATITIS PANEL: CPT

## 2023-09-29 PROCEDURE — 700111 HCHG RX REV CODE 636 W/ 250 OVERRIDE (IP): Performed by: STUDENT IN AN ORGANIZED HEALTH CARE EDUCATION/TRAINING PROGRAM

## 2023-09-29 RX ORDER — POTASSIUM CHLORIDE 20 MEQ/1
20 TABLET, EXTENDED RELEASE ORAL ONCE
Status: COMPLETED | OUTPATIENT
Start: 2023-09-29 | End: 2023-09-29

## 2023-09-29 RX ORDER — GAUZE BANDAGE 2" X 2"
100 BANDAGE TOPICAL DAILY
Status: DISCONTINUED | OUTPATIENT
Start: 2023-09-29 | End: 2023-10-01 | Stop reason: HOSPADM

## 2023-09-29 RX ORDER — ENOXAPARIN SODIUM 100 MG/ML
40 INJECTION SUBCUTANEOUS DAILY
Status: DISCONTINUED | OUTPATIENT
Start: 2023-09-29 | End: 2023-10-01 | Stop reason: HOSPADM

## 2023-09-29 RX ORDER — FOLIC ACID 1 MG/1
1 TABLET ORAL DAILY
Status: DISCONTINUED | OUTPATIENT
Start: 2023-09-29 | End: 2023-10-01 | Stop reason: HOSPADM

## 2023-09-29 RX ORDER — MAGNESIUM SULFATE HEPTAHYDRATE 40 MG/ML
2 INJECTION, SOLUTION INTRAVENOUS ONCE
Status: COMPLETED | OUTPATIENT
Start: 2023-09-29 | End: 2023-09-29

## 2023-09-29 RX ORDER — POTASSIUM CHLORIDE 7.45 MG/ML
10 INJECTION INTRAVENOUS
Status: DISPENSED | OUTPATIENT
Start: 2023-09-29 | End: 2023-09-29

## 2023-09-29 RX ADMIN — POTASSIUM CHLORIDE 10 MEQ: 7.46 INJECTION, SOLUTION INTRAVENOUS at 06:26

## 2023-09-29 RX ADMIN — MAGNESIUM SULFATE HEPTAHYDRATE 2 G: 40 INJECTION, SOLUTION INTRAVENOUS at 04:18

## 2023-09-29 RX ADMIN — Medication 100 MG: at 15:50

## 2023-09-29 RX ADMIN — CHLORDIAZEPOXIDE HYDROCHLORIDE 50 MG: 25 CAPSULE ORAL at 06:27

## 2023-09-29 RX ADMIN — CHLORDIAZEPOXIDE HYDROCHLORIDE 50 MG: 25 CAPSULE ORAL at 00:34

## 2023-09-29 RX ADMIN — POTASSIUM CHLORIDE 10 MEQ: 7.46 INJECTION, SOLUTION INTRAVENOUS at 04:12

## 2023-09-29 RX ADMIN — THERA TABS 1 TABLET: TAB at 15:50

## 2023-09-29 RX ADMIN — LORAZEPAM 2 MG: 2 TABLET ORAL at 00:35

## 2023-09-29 RX ADMIN — LORAZEPAM 1 MG: 1 TABLET ORAL at 20:58

## 2023-09-29 RX ADMIN — DIBASIC SODIUM PHOSPHATE, MONOBASIC POTASSIUM PHOSPHATE AND MONOBASIC SODIUM PHOSPHATE 500 MG: 852; 155; 130 TABLET ORAL at 11:22

## 2023-09-29 RX ADMIN — POTASSIUM CHLORIDE 20 MEQ: 1500 TABLET, EXTENDED RELEASE ORAL at 04:19

## 2023-09-29 RX ADMIN — LORAZEPAM 1 MG: 1 TABLET ORAL at 15:51

## 2023-09-29 RX ADMIN — LORAZEPAM 1 MG: 1 TABLET ORAL at 02:35

## 2023-09-29 RX ADMIN — FOLIC ACID 1 MG: 1 TABLET ORAL at 15:50

## 2023-09-29 RX ADMIN — POTASSIUM CHLORIDE 10 MEQ: 7.46 INJECTION, SOLUTION INTRAVENOUS at 08:56

## 2023-09-29 RX ADMIN — LORAZEPAM 1 MG: 1 TABLET ORAL at 11:22

## 2023-09-29 ASSESSMENT — LIFESTYLE VARIABLES
HEADACHE, FULLNESS IN HEAD: NOT PRESENT
HAVE PEOPLE ANNOYED YOU BY CRITICIZING YOUR DRINKING: YES
EVER FELT BAD OR GUILTY ABOUT YOUR DRINKING: YES
ANXIETY: *
TREMOR: TREMOR NOT VISIBLE BUT CAN BE FELT, FINGERTIP TO FINGERTIP
NAUSEA AND VOMITING: NO NAUSEA AND NO VOMITING
HAVE YOU EVER FELT YOU SHOULD CUT DOWN ON YOUR DRINKING: YES
TOTAL SCORE: 10
TOTAL SCORE: 10
PAROXYSMAL SWEATS: *
TREMOR: *
ANXIETY: *
ORIENTATION AND CLOUDING OF SENSORIUM: ORIENTED AND CAN DO SERIAL ADDITIONS
TOTAL SCORE: 4
AGITATION: *
DOES PATIENT WANT TO TALK TO SOMEONE ABOUT QUITTING: YES
VISUAL DISTURBANCES: NOT PRESENT
NAUSEA AND VOMITING: NO NAUSEA AND NO VOMITING
NAUSEA AND VOMITING: NO NAUSEA AND NO VOMITING
ANXIETY: *
ORIENTATION AND CLOUDING OF SENSORIUM: ORIENTED AND CAN DO SERIAL ADDITIONS
TOTAL SCORE: 4
EVER HAD A DRINK FIRST THING IN THE MORNING TO STEADY YOUR NERVES TO GET RID OF A HANGOVER: YES
TOTAL SCORE: 8
ANXIETY: MODERATELY ANXIOUS OR GUARDED, SO ANXIETY IS INFERRED
AVERAGE NUMBER OF DAYS PER WEEK YOU HAVE A DRINK CONTAINING ALCOHOL: 7
TOTAL SCORE: 14
HEADACHE, FULLNESS IN HEAD: NOT PRESENT
CONSUMPTION TOTAL: POSITIVE
PAROXYSMAL SWEATS: *
NAUSEA AND VOMITING: MILD NAUSEA WITH NO VOMITING
VISUAL DISTURBANCES: NOT PRESENT
PAROXYSMAL SWEATS: *
AGITATION: NORMAL ACTIVITY
HEADACHE, FULLNESS IN HEAD: NOT PRESENT
PAROXYSMAL SWEATS: *
HEADACHE, FULLNESS IN HEAD: NOT PRESENT
ANXIETY: MILDLY ANXIOUS
TREMOR: MODERATE TREMOR WITH ARMS EXTENDED
TREMOR: *
AUDITORY DISTURBANCES: NOT PRESENT
HEADACHE, FULLNESS IN HEAD: NOT PRESENT
AGITATION: *
AUDITORY DISTURBANCES: NOT PRESENT
AUDITORY DISTURBANCES: NOT PRESENT
NAUSEA AND VOMITING: MILD NAUSEA WITH NO VOMITING
TREMOR: *
ORIENTATION AND CLOUDING OF SENSORIUM: ORIENTED AND CAN DO SERIAL ADDITIONS
ON A TYPICAL DAY WHEN YOU DRINK ALCOHOL HOW MANY DRINKS DO YOU HAVE: 12
AGITATION: NORMAL ACTIVITY
TREMOR: *
AUDITORY DISTURBANCES: NOT PRESENT
ORIENTATION AND CLOUDING OF SENSORIUM: ORIENTED AND CAN DO SERIAL ADDITIONS
ORIENTATION AND CLOUDING OF SENSORIUM: ORIENTED AND CAN DO SERIAL ADDITIONS
VISUAL DISTURBANCES: MILD SENSITIVITY
AUDITORY DISTURBANCES: NOT PRESENT
HOW MANY TIMES IN THE PAST YEAR HAVE YOU HAD 5 OR MORE DRINKS IN A DAY: 0
TOTAL SCORE: 9
AUDITORY DISTURBANCES: NOT PRESENT
DO YOU DRINK ALCOHOL: YES
HEADACHE, FULLNESS IN HEAD: NOT PRESENT
DOES PATIENT WANT TO STOP DRINKING: YES
AGITATION: NORMAL ACTIVITY
PAROXYSMAL SWEATS: *
AGITATION: *
PAROXYSMAL SWEATS: *
NAUSEA AND VOMITING: MILD NAUSEA WITH NO VOMITING
ORIENTATION AND CLOUDING OF SENSORIUM: ORIENTED AND CAN DO SERIAL ADDITIONS
VISUAL DISTURBANCES: NOT PRESENT
TOTAL SCORE: 4
TOTAL SCORE: 4
VISUAL DISTURBANCES: NOT PRESENT
VISUAL DISTURBANCES: MILD SENSITIVITY
ANXIETY: *

## 2023-09-29 ASSESSMENT — ENCOUNTER SYMPTOMS
TREMORS: 1
DOUBLE VISION: 0
BRUISES/BLEEDS EASILY: 0
COUGH: 0
NECK PAIN: 0
PALPITATIONS: 0
NERVOUS/ANXIOUS: 1
DIZZINESS: 0
NAUSEA: 1
BLURRED VISION: 0
HALLUCINATIONS: 1
FEVER: 0
HEMOPTYSIS: 0
HEADACHES: 0
CHILLS: 0
HEARTBURN: 0
DEPRESSION: 0
MYALGIAS: 0

## 2023-09-29 ASSESSMENT — PAIN DESCRIPTION - PAIN TYPE
TYPE: ACUTE PAIN
TYPE: ACUTE PAIN

## 2023-09-29 ASSESSMENT — FIBROSIS 4 INDEX: FIB4 SCORE: 3.32

## 2023-09-29 NOTE — PROGRESS NOTES
Hospital Medicine Daily Progress Note    Date of Service  9/29/2023    Chief Complaint  Maxime Fernandez is a 37 y.o. male admitted 9/28/2023 with alcohol withdrawal    Hospital Course  37 y.o. male prior alcoholic sober for 3 years reports he started drinking again .  Since 9/16 who presented 9/28/2023 with visual hallucinations, tremors and anxiety.  Reports he drinks 750 mL of whiskey since 16 September.  Currently denies any chest pain or abdominal pain.  He is not on any blood thinners.  Denies using any recreational drugs.  He denies any suicidal or homicidal ideation.      In the ER, he was noted to have low potassium, high anion gap given IV fluids and electrolytes were repleted.  He will be admitted for acute alcohol withdrawal  requiring inpatient treatment.    Interval Problem Update  Seen patient at bedside  Aox4  Ciwa score 9  Continue po and iv ativan prn for ciwa protocol  Cont MV, folate and B1  I have lengthy discussion with him for alcohol abuse and complications of alcohol abuse. Patient voiced understanding. I have offerred addition medicine Dr. Ramirez outpatient follow up. Patient agreed. Outpatient referral placed.   Diet advanced  Cont IVF  Labs am  I have evaluated patient both in the morning and afternoon. Address the importance of alcohol cessation.     I have discussed this patient's plan of care and discharge plan at IDT rounds today with Case Management, Nursing, Nursing leadership, and other members of the IDT team.    Consultants/Specialty  na    Code Status  Full Code    Disposition  The patient is not medically cleared for discharge to home or a post-acute facility.      I have placed the appropriate orders for post-discharge needs.    Review of Systems  Review of Systems   Constitutional:  Positive for malaise/fatigue.   Neurological:  Positive for tremors.   All other systems reviewed and are negative.       Physical Exam  Temp:  [36.4 °C (97.5 °F)-37.4 °C (99.3 °F)] 36.4 °C  (97.5 °F)  Pulse:  [106-123] 110  Resp:  [12-20] 18  BP: (136-155)/() 143/105  SpO2:  [91 %-95 %] 95 %    Physical Exam  Vitals and nursing note reviewed.   Constitutional:       Appearance: Normal appearance. He is ill-appearing.   HENT:      Head: Normocephalic and atraumatic.      Mouth/Throat:      Pharynx: Oropharynx is clear.   Eyes:      Pupils: Pupils are equal, round, and reactive to light.   Neck:      Vascular: No carotid bruit.   Cardiovascular:      Rate and Rhythm: Normal rate and regular rhythm.   Pulmonary:      Effort: Pulmonary effort is normal.      Breath sounds: Normal breath sounds.   Abdominal:      General: Abdomen is flat. Bowel sounds are normal. There is no distension.      Palpations: Abdomen is soft. There is no mass.      Tenderness: There is no abdominal tenderness.   Musculoskeletal:         General: Normal range of motion.      Cervical back: Neck supple.      Comments: Noted tremors in both hands   Skin:     General: Skin is warm and dry.   Neurological:      General: No focal deficit present.      Mental Status: He is alert and oriented to person, place, and time.   Psychiatric:         Mood and Affect: Mood normal.         Behavior: Behavior normal.         Fluids    Intake/Output Summary (Last 24 hours) at 9/29/2023 1408  Last data filed at 9/29/2023 0700  Gross per 24 hour   Intake 1400 ml   Output 1100 ml   Net 300 ml       Laboratory  Recent Labs     09/28/23  1844   WBC 8.0   RBC 5.52   HEMOGLOBIN 17.1   HEMATOCRIT 45.8   MCV 83.0   MCH 31.0   MCHC 37.3*   RDW 37.4   PLATELETCT 168   MPV 10.6     Recent Labs     09/28/23  1844 09/29/23  0229 09/29/23  0849   SODIUM 133* 134* 131*   POTASSIUM 3.1* 2.9* 3.2*   CHLORIDE 91* 97 94*   CO2 19* 28 25   GLUCOSE 127* 125* 121*   BUN 16 12 10   CREATININE 0.88 0.81 0.86   CALCIUM 8.1* 7.8* 7.7*                   Imaging  No orders to display        Assessment/Plan  * Alcohol withdrawal with perceptual disturbances (HCC)- (present  on admission)  Assessment & Plan  CIWA protocol  Continue with IV fluids  Supplement magnesium and potassium  Monitor for DTs  Seizure precautions  Encourage sobriety    High anion gap metabolic acidosis  Assessment & Plan  IV Fluids     Elevated LFTs- (present on admission)  Assessment & Plan  Check hep panel  Right upper quadrant sonogram   Avoid hepatotoxic agents    Hypomagnesemia- (present on admission)  Assessment & Plan  IV mag 2 gram supplemented    Hypokalemia- (present on admission)  Assessment & Plan  Replete          VTE prophylaxis:    enoxaparin ppx      I have performed a physical exam and reviewed and updated ROS and Plan today (9/29/2023). In review of yesterday's note (9/28/2023), there are no changes except as documented above.    Patient is has a high medical complexity, complex decision making and is at high risk for complication, morbidity, and mortality.  I spent 51 minutes, reviewing the chart, obtaining and/or reviewing separately obtained history. Performing a medically appropriate examination and evaluation.  Counseling and educating the patient. Ordering and reviewing medications, tests, or procedures.  Documenting clinical information in EPIC. Independently interpreting results and communicating results to patient. Discussing future disposition of care with patient, RN and case management.  This does not include time spent on separately billable procedures/tests.

## 2023-09-29 NOTE — PROGRESS NOTES
Report received from Lisa AN. Assumed care of pt. Pt safely walked from [place] to bed. Pt resting comfortably in bed. A/Ox4, BP and HR elevated, RA. D5 NS infusing at 150 ml/hr.  All needs met. POC reviewed and white board updated. Tele box on. Call light in reach. Bed locked in lowest position with 2 upper bed rails up. No pain or complaints

## 2023-09-29 NOTE — DISCHARGE PLANNING
Case Management Discharge Planning    Admission Date: 9/28/2023  GMLOS: 3.3  ALOS: 1    6-Clicks ADL Score:    6-Clicks Mobility Score:        Anticipated Discharge Dispo: Discharge Disposition: Discharged to home/self care (01)    DME Needed: No    Action(s) Taken: DC Assessment Complete (See below)    Met with pt at bedside to complete assessment. Pt reports he lives at address listed on facesheet with s/o and their 4 minor children. Pt is independent with self care and works full time @ [employer]. Pt states having insurance through "[employer] notified.   SW provided ETOH cessation resources. Pt reports having been to [facility] in the past.     Anticipate d/c home when medically cleared    Escalations Completed: None    Medically Clear: No    Next Steps: D/C home when cleared    Barriers to Discharge: None    Is the patient up for discharge tomorrow: No     Care Transition Team Assessment    Information Source  Orientation Level: Oriented to place, Oriented to situation, Oriented to person, Disoriented to time  Information Given By: Patient    Elopement Risk  Legal Hold: No  Ambulatory or Self Mobile in Wheelchair: No-Not an Elopement Risk  Elopement Risk: Not at Risk for Elopement    Discharge Preparedness  What is your plan after discharge?: Home with help  What are your discharge supports?: Partner  Prior Functional Level: Ambulatory, Independent with Activities of Daily Living, Independent with Medication Management    Functional Assesment  Prior Functional Level: Ambulatory, Independent with Activities of Daily Living, Independent with Medication Management    Finances  Financial Barriers to Discharge: No  Prescription Coverage: Yes      Domestic Abuse  Have you ever been the victim of abuse or violence?: No    Psychological Assessment  History of Substance Abuse: Alcohol  History of Psychiatric Problems: No  Non-compliant with Treatment: No  Newly Diagnosed Illness: Yes    Discharge Risks or Barriers  Discharge risks or  barriers?: No    Anticipated Discharge Information  Discharge Disposition: Discharged to home/self care (01)

## 2023-09-29 NOTE — ED PROVIDER NOTES
ED Provider Note    CHIEF COMPLAINT  Chief Complaint   Patient presents with    ETOH Withdrawal     Pt was previously sober for three years, but relapsed and heaving drinking since 9/16. Pt was sent in by nurse hotline after calling them for advice. Pt reports being very tremulous at home as well as having visual hallucinations and took a shot right before coming to the ER.       EXTERNAL RECORDS REVIEWED  Inpatient Notes admission 2019 EtOH withdrawal, 3 days inpatient    HPI/ROS  LIMITATION TO HISTORY   Select: : None  OUTSIDE HISTORIAN(S):  Family niece    Maxime Fernandez is a 37 y.o. male who presents with headache, nausea, tremors, and all of visual hallucinations.  He notes a history of alcohol withdrawal, denies history of alcohol withdrawal seizures, he had been sober until 1 month ago, when he relapsed, he is drink every day since the 16th, drinking approximately 750 mL of whiskey.  Today he has had 2 hard seltzers, 1 right before he arrived here.  He also reports throat discomfort after he was trying to gag himself in order to vomit.  Has any recent falls traumatic injury, has not eaten any food in 9 days.    PAST MEDICAL HISTORY   has a past medical history of Anxiety and Psychiatric disorder.    SURGICAL HISTORY  patient denies any surgical history    FAMILY HISTORY  Family History   Problem Relation Age of Onset    Thyroid Mother     Hyperlipidemia Father     No Known Problems Sister     No Known Problems Brother        SOCIAL HISTORY  Social History     Tobacco Use    Smoking status: Never    Smokeless tobacco: Never   Vaping Use    Vaping Use: Never used   Substance and Sexual Activity    Alcohol use: Not Currently     Comment: over 2 years sober.    Drug use: No    Sexual activity: Yes     Partners: Female       CURRENT MEDICATIONS  Home Medications       Reviewed by Carley Encarnacion R.N. (Registered Nurse) on 09/28/23 at 1614  Med List Status: Not Addressed     Medication Last Dose Status  "  albuterol 108 (90 Base) MCG/ACT Aero Soln inhalation aerosol  Active   Esomeprazole Magnesium (NEXIUM PO)  Active   fexofenadine-pseudoephedrine (ALLEGRA-D)  MG per tablet  Active   Fluticasone Propionate (FLONASE NA)  Active   promethazine-dextromethorphan (PROMETHAZINE-DM) 6.25-15 MG/5ML syrup  Active                    ALLERGIES  No Known Allergies    PHYSICAL EXAM  VITAL SIGNS: /85   Pulse (!) 110   Temp 36.4 °C (97.5 °F) (Temporal)   Resp 12   Ht 1.791 m (5' 10.5\")   Wt 94.8 kg (208 lb 15.9 oz)   SpO2 93%   BMI 29.56 kg/m²    General: Alert male, not obviously respond to internal stimuli, uncomfortable appearing, agitated appearing  Head: Normocephalic atraumatic  Eyes: Extraocular motion intact  Mouth: Dry mucous membranes  Neck: Supple, no rigidity  Cardiovascular: Tachycardic rate and rhythm no murmurs rubs or gallops  Respiratory: Clear to auscultation bilaterally, equal chest rise and fall, no increased work of breathing  Abdomen: Soft nontender no guarding  Musculoskeletal: Warm and well perfused, no peripheral edema  Neuro: Alert, no focal deficits positive peripheral tremors, tongue fasciculations.  Integumentary: No wounds or rashes      DIAGNOSTIC STUDIES / PROCEDURES  EKG  I have independently interpreted this EKG    Sinus tachycardia, rate 116, normal axis, prolonged QTc, otherwise normal intervals, no ST elevations, nonspecific ST abnormalities are noted.    LABS  Labs Reviewed   CBC WITH DIFFERENTIAL - Abnormal; Notable for the following components:       Result Value    MCHC 37.3 (*)     Neutrophils-Polys 87.20 (*)     Lymphocytes 7.60 (*)     Lymphs (Absolute) 0.61 (*)     All other components within normal limits   COMP METABOLIC PANEL - Abnormal; Notable for the following components:    Sodium 133 (*)     Potassium 3.1 (*)     Chloride 91 (*)     Co2 19 (*)     Anion Gap 23.0 (*)     Glucose 127 (*)     Calcium 8.1 (*)     Correct Calcium 7.8 (*)     AST(SGOT) 164 (*)     " ALT(SGPT) 125 (*)     All other components within normal limits   ESTIMATED GFR   MAGNESIUM   PHOSPHORUS             COURSE & MEDICAL DECISION MAKING    ED Observation Status? Yes; I am placing the patient in to an observation status due to a diagnostic uncertainty as well as therapeutic intensity. Patient placed in observation status at 6:28 PM, 9/28/2023.     Observation plan is as follows: Serial doses of barbiturates as needed, fluids, labs, thiamine, p.o. trial, escalation of care an if necessary  And symptoms did not improve  Patient reassessed after initial dose of phenobarbital, still quite tachycardic, diaphoretic, and tremulous  Not significantly improved, heart rate in the 140s.    Upon reassessment after second dose of phenobarbital, patient improving, but still tremulous and tachycardic, started on CIWA protocol, started D5NS for alcoholic ketoacidosis, lites have been repleted, and admitted for further treatment of alcohol withdrawal syndrome.    Upon Reevaluation, the patient's condition has: not improved; and will be escalated to hospitalization.    Patient discharged from ED Observation status at 10:45 PM 9/28/2023    INITIAL ASSESSMENT, COURSE AND PLAN  Care Narrative: 37-year-old male presenting with symptoms of alcohol withdrawal, he been sober for several years but just relapsed, last drink was just prior to arrival though notably he has been drinking 750 mL of whiskey per day, and had 2 hard seltzers today.  He is notable to have tachycardia, and elevated blood pressure on his vital signs in triage, and on exam he has tongue fasciculations peripheral tremors and appears overall hypovolemic.  Differential diagnose include was not limited to: Alcohol withdrawal, delirium tremens, early, electrolyte abnormality, alcoholic ketoacidosis, gastritis, dehydration,    We will initiate phenobarbital serially if necessary, frequently reexamine, give IV fluids, thiamine, check labs, and reassess the  patient.  HYDRATION: Based on the patient's presentation of Dehydration the patient was given IV fluids. IV Hydration was used because oral hydration was not as rapid as required. Upon recheck following hydration, the patient was mildly improved.      ADDITIONAL PROBLEM LIST  Hypokalemia, alcoholic ketoacidosis  DISPOSITION AND DISCUSSIONS  I have discussed management of the patient with the following physicians and ROXY's: Dr. Landa    Barriers to care at this time, including but not limited to:  Alcohol use disorder .     Decision tools and prescription drugs considered including, but not limited to:  antiemetics, barbituates, benzodiazepines, electrolytes, fluids, thiamine. .  Considered IMCU however patient is improving after medication administration, and believe he can go to the telemetry floor safely now.      FINAL DIAGNOSIS  1. 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 Dehydration    3. Alcoholic ketoacidosis       CRITICAL CARE  The very real possibilty of a deterioration of this patient's condition required the highest level of my preparedness for sudden, emergent intervention.  I provided critical care services, which included medication orders, frequent reevaluations of the patient's condition and response to treatment, ordering and reviewing test results, and discussing the case with various consultants.  The critical care time associated with the care of the patient was 36 minutes. Review chart for interventions. This time is exclusive of any other billable procedures.       Electronically signed by: Cameron Merrill M.D., 9/28/2023 6:27 PM

## 2023-09-29 NOTE — ASSESSMENT & PLAN NOTE
CIWA protocol  Continue with IV fluids  Supplement magnesium and potassium  Monitor for DTs  Seizure precautions  Encourage sobriety  Community resources provided to patient  I have offerred addition medicine Dr. Ramirez outpatient follow up. Patient agreed. Outpatient referral placed.   9/30:  Continue ciwa protocol with po and iv ativan prn   Noted tachycardia. Check TSH

## 2023-09-29 NOTE — ED NOTES
Pt ambulated to [facility] with a steady gait. C/C is ETOH Withdrawal. Pt is on the monitor and call light within reach. Chart up for ERP. Family at bedside.

## 2023-09-29 NOTE — ED NOTES
Pt states they feel worse since arriving to ED. Pt told they will go back for treatment ASAP. Apologize for the wait time.

## 2023-09-29 NOTE — PROGRESS NOTES
4 Eyes Skin Assessment Completed by JUVE Jones and Nabil, YESSENIA    Head WDL  Ears WDL  Nose WDL  Mouth WDL  Neck WDL  Breast/Chest WDL  Shoulder Blades WDL  Spine WDL  (R) Arm/Elbow/Hand WDL  (L) Arm/Elbow/Hand WDL  Abdomen WDL  Groin WDL  Scrotum/Coccyx/Buttocks WDL  (R) Leg WDL  (L) Leg WDL  (R) Heel/Foot/Toe WDL, dryness, flaky  (L) Heel/Foot/Toe WDL, dryness, flaky      Devices In Places Tele Box, Blood Pressure Cuff, and Pulse Ox      Interventions In Place Pillows and Pressure Redistribution Mattress    Possible Skin Injury No    Pictures Uploaded Into Epic N/A  Wound Consult Placed N/A  RN Wound Prevention Protocol Ordered No     1.49

## 2023-09-29 NOTE — CARE PLAN
The patient is Watcher - Medium risk of patient condition declining or worsening    Shift Goals  Clinical Goals: ciwa, iv hydration  Patient Goals: feel better, stop drinking alcohol  Family Goals: No family at bedside    Progress made toward(s) clinical / shift goals: Plan of care and medications discussed and reviewed over with pt. Pt on ciwa protocol. Given prn ativan as needed based on symptoms. Pt report of relief with medications. Pt pointed out he would like professional help to stop drinking. CM in place. Seizure precautions in place. Suction at bedside. All fall precautions in place. Bed alarm is on.      Problem: Knowledge Deficit - Standard  Goal: Patient and family/care givers will demonstrate understanding of plan of care, disease process/condition, diagnostic tests and medications  Outcome: Progressing     Problem: Optimal Care for Alcohol Withdrawal  Goal: Optimal Care for the alcohol withdrawal patient  Outcome: Progressing     Problem: Seizure Precautions  Goal: Implementation of seizure precautions  Outcome: Progressing     Problem: Lifestyle Changes  Goal: Patient's ability to identify lifestyle changes and available resources to help reduce recurrence of condition will improve  Outcome: Progressing     Problem: Psychosocial  Goal: Patient's level of anxiety will decrease  Outcome: Progressing     Problem: Fall Risk  Goal: Patient will remain free from falls  Outcome: Progressing

## 2023-09-29 NOTE — ED NOTES
Med Rec complete per Pt at bedside.  Allergies reviewed.   Home Pharmacy:  [facility]    Pt does not take any anticoagulants.

## 2023-09-30 LAB
ALBUMIN SERPL BCP-MCNC: 3.9 G/DL (ref 3.2–4.9)
ALBUMIN/GLOB SERPL: 1.4 G/DL
ALP SERPL-CCNC: 96 U/L (ref 30–99)
ALT SERPL-CCNC: 231 U/L (ref 2–50)
ANION GAP SERPL CALC-SCNC: 13 MMOL/L (ref 7–16)
AST SERPL-CCNC: 333 U/L (ref 12–45)
BILIRUB SERPL-MCNC: 0.9 MG/DL (ref 0.1–1.5)
BUN SERPL-MCNC: 5 MG/DL (ref 8–22)
CALCIUM ALBUM COR SERPL-MCNC: 8.3 MG/DL (ref 8.5–10.5)
CALCIUM SERPL-MCNC: 8.2 MG/DL (ref 8.5–10.5)
CHLORIDE SERPL-SCNC: 96 MMOL/L (ref 96–112)
CO2 SERPL-SCNC: 23 MMOL/L (ref 20–33)
CREAT SERPL-MCNC: 0.94 MG/DL (ref 0.5–1.4)
ERYTHROCYTE [DISTWIDTH] IN BLOOD BY AUTOMATED COUNT: 36.7 FL (ref 35.9–50)
GFR SERPLBLD CREATININE-BSD FMLA CKD-EPI: 106 ML/MIN/1.73 M 2
GLOBULIN SER CALC-MCNC: 2.8 G/DL (ref 1.9–3.5)
GLUCOSE SERPL-MCNC: 116 MG/DL (ref 65–99)
HCT VFR BLD AUTO: 43.1 % (ref 42–52)
HGB BLD-MCNC: 15.7 G/DL (ref 14–18)
MAGNESIUM SERPL-MCNC: 1.8 MG/DL (ref 1.5–2.5)
MCH RBC QN AUTO: 30 PG (ref 27–33)
MCHC RBC AUTO-ENTMCNC: 36.4 G/DL (ref 32.3–36.5)
MCV RBC AUTO: 82.4 FL (ref 81.4–97.8)
PHOSPHATE SERPL-MCNC: 3.2 MG/DL (ref 2.5–4.5)
PLATELET # BLD AUTO: 98 K/UL (ref 164–446)
PMV BLD AUTO: 11.3 FL (ref 9–12.9)
POTASSIUM SERPL-SCNC: 2.9 MMOL/L (ref 3.6–5.5)
PROT SERPL-MCNC: 6.7 G/DL (ref 6–8.2)
RBC # BLD AUTO: 5.23 M/UL (ref 4.7–6.1)
SODIUM SERPL-SCNC: 132 MMOL/L (ref 135–145)
WBC # BLD AUTO: 6.7 K/UL (ref 4.8–10.8)

## 2023-09-30 PROCEDURE — A9270 NON-COVERED ITEM OR SERVICE: HCPCS | Mod: JZ

## 2023-09-30 PROCEDURE — 700111 HCHG RX REV CODE 636 W/ 250 OVERRIDE (IP): Mod: JZ | Performed by: STUDENT IN AN ORGANIZED HEALTH CARE EDUCATION/TRAINING PROGRAM

## 2023-09-30 PROCEDURE — 83735 ASSAY OF MAGNESIUM: CPT

## 2023-09-30 PROCEDURE — 700102 HCHG RX REV CODE 250 W/ 637 OVERRIDE(OP): Mod: JZ

## 2023-09-30 PROCEDURE — 700102 HCHG RX REV CODE 250 W/ 637 OVERRIDE(OP): Performed by: STUDENT IN AN ORGANIZED HEALTH CARE EDUCATION/TRAINING PROGRAM

## 2023-09-30 PROCEDURE — 80053 COMPREHEN METABOLIC PANEL: CPT

## 2023-09-30 PROCEDURE — 99233 SBSQ HOSP IP/OBS HIGH 50: CPT | Performed by: STUDENT IN AN ORGANIZED HEALTH CARE EDUCATION/TRAINING PROGRAM

## 2023-09-30 PROCEDURE — 700101 HCHG RX REV CODE 250: Performed by: STUDENT IN AN ORGANIZED HEALTH CARE EDUCATION/TRAINING PROGRAM

## 2023-09-30 PROCEDURE — 770020 HCHG ROOM/CARE - TELE (206)

## 2023-09-30 PROCEDURE — A9270 NON-COVERED ITEM OR SERVICE: HCPCS | Performed by: STUDENT IN AN ORGANIZED HEALTH CARE EDUCATION/TRAINING PROGRAM

## 2023-09-30 PROCEDURE — 85027 COMPLETE CBC AUTOMATED: CPT

## 2023-09-30 PROCEDURE — 84100 ASSAY OF PHOSPHORUS: CPT

## 2023-09-30 PROCEDURE — 36415 COLL VENOUS BLD VENIPUNCTURE: CPT

## 2023-09-30 RX ORDER — SODIUM CHLORIDE AND POTASSIUM CHLORIDE 300; 900 MG/100ML; MG/100ML
INJECTION, SOLUTION INTRAVENOUS CONTINUOUS
Status: DISCONTINUED | OUTPATIENT
Start: 2023-09-30 | End: 2023-10-01 | Stop reason: HOSPADM

## 2023-09-30 RX ORDER — POTASSIUM CHLORIDE 20 MEQ/1
40 TABLET, EXTENDED RELEASE ORAL EVERY 4 HOURS
Status: COMPLETED | OUTPATIENT
Start: 2023-09-30 | End: 2023-09-30

## 2023-09-30 RX ADMIN — LORAZEPAM 1 MG: 1 TABLET ORAL at 04:02

## 2023-09-30 RX ADMIN — LORAZEPAM 0.5 MG: 0.5 TABLET ORAL at 08:03

## 2023-09-30 RX ADMIN — POTASSIUM CHLORIDE AND SODIUM CHLORIDE: 900; 300 INJECTION, SOLUTION INTRAVENOUS at 07:48

## 2023-09-30 RX ADMIN — POTASSIUM CHLORIDE 40 MEQ: 1500 TABLET, EXTENDED RELEASE ORAL at 05:20

## 2023-09-30 RX ADMIN — DIBASIC SODIUM PHOSPHATE, MONOBASIC POTASSIUM PHOSPHATE AND MONOBASIC SODIUM PHOSPHATE 500 MG: 852; 155; 130 TABLET ORAL at 04:02

## 2023-09-30 RX ADMIN — FOLIC ACID 1 MG: 1 TABLET ORAL at 04:02

## 2023-09-30 RX ADMIN — POTASSIUM CHLORIDE 40 MEQ: 1500 TABLET, EXTENDED RELEASE ORAL at 09:29

## 2023-09-30 RX ADMIN — DIBASIC SODIUM PHOSPHATE, MONOBASIC POTASSIUM PHOSPHATE AND MONOBASIC SODIUM PHOSPHATE 500 MG: 852; 155; 130 TABLET ORAL at 16:08

## 2023-09-30 RX ADMIN — Medication 100 MG: at 04:02

## 2023-09-30 RX ADMIN — ENOXAPARIN SODIUM 40 MG: 100 INJECTION SUBCUTANEOUS at 16:08

## 2023-09-30 RX ADMIN — THERA TABS 1 TABLET: TAB at 04:02

## 2023-09-30 RX ADMIN — LORAZEPAM 0.5 MG: 0.5 TABLET ORAL at 16:08

## 2023-09-30 RX ADMIN — POTASSIUM CHLORIDE AND SODIUM CHLORIDE: 900; 300 INJECTION, SOLUTION INTRAVENOUS at 17:39

## 2023-09-30 RX ADMIN — LORAZEPAM 1 MG: 1 TABLET ORAL at 23:31

## 2023-09-30 ASSESSMENT — LIFESTYLE VARIABLES
NAUSEA AND VOMITING: NO NAUSEA AND NO VOMITING
TOTAL SCORE: 5
VISUAL DISTURBANCES: NOT PRESENT
ANXIETY: *
ORIENTATION AND CLOUDING OF SENSORIUM: ORIENTED AND CAN DO SERIAL ADDITIONS
TOTAL SCORE: 8
HEADACHE, FULLNESS IN HEAD: VERY MILD
TOTAL SCORE: 3
PAROXYSMAL SWEATS: NO SWEAT VISIBLE
AUDITORY DISTURBANCES: NOT PRESENT
HEADACHE, FULLNESS IN HEAD: NOT PRESENT
VISUAL DISTURBANCES: NOT PRESENT
TOTAL SCORE: 3
PAROXYSMAL SWEATS: *
NAUSEA AND VOMITING: NO NAUSEA AND NO VOMITING
TREMOR: *
HEADACHE, FULLNESS IN HEAD: NOT PRESENT
NAUSEA AND VOMITING: NO NAUSEA AND NO VOMITING
ORIENTATION AND CLOUDING OF SENSORIUM: ORIENTED AND CAN DO SERIAL ADDITIONS
PAROXYSMAL SWEATS: *
NAUSEA AND VOMITING: NO NAUSEA AND NO VOMITING
TREMOR: *
ORIENTATION AND CLOUDING OF SENSORIUM: ORIENTED AND CAN DO SERIAL ADDITIONS
AUDITORY DISTURBANCES: NOT PRESENT
ANXIETY: MILDLY ANXIOUS
AGITATION: NORMAL ACTIVITY
HEADACHE, FULLNESS IN HEAD: MILD
ORIENTATION AND CLOUDING OF SENSORIUM: ORIENTED AND CAN DO SERIAL ADDITIONS
AGITATION: SOMEWHAT MORE THAN NORMAL ACTIVITY
VISUAL DISTURBANCES: NOT PRESENT
VISUAL DISTURBANCES: VERY MILD SENSITIVITY
AGITATION: NORMAL ACTIVITY
TREMOR: NO TREMOR
ORIENTATION AND CLOUDING OF SENSORIUM: ORIENTED AND CAN DO SERIAL ADDITIONS
ANXIETY: MILDLY ANXIOUS
HEADACHE, FULLNESS IN HEAD: NOT PRESENT
HEADACHE, FULLNESS IN HEAD: NOT PRESENT
AUDITORY DISTURBANCES: NOT PRESENT
TREMOR: NO TREMOR
ORIENTATION AND CLOUDING OF SENSORIUM: ORIENTED AND CAN DO SERIAL ADDITIONS
AUDITORY DISTURBANCES: NOT PRESENT
HEADACHE, FULLNESS IN HEAD: NOT PRESENT
PAROXYSMAL SWEATS: NO SWEAT VISIBLE
TOTAL SCORE: 5
ANXIETY: *
TREMOR: TREMOR NOT VISIBLE BUT CAN BE FELT, FINGERTIP TO FINGERTIP
NAUSEA AND VOMITING: NO NAUSEA AND NO VOMITING
AGITATION: SOMEWHAT MORE THAN NORMAL ACTIVITY
PAROXYSMAL SWEATS: *
NAUSEA AND VOMITING: NO NAUSEA AND NO VOMITING
PAROXYSMAL SWEATS: *
AUDITORY DISTURBANCES: NOT PRESENT
ANXIETY: NO ANXIETY (AT EASE)
AGITATION: NORMAL ACTIVITY
PAROXYSMAL SWEATS: BARELY PERCEPTIBLE SWEATING. PALMS MOIST
TOTAL SCORE: 3
VISUAL DISTURBANCES: NOT PRESENT
TREMOR: *
ANXIETY: NO ANXIETY (AT EASE)
VISUAL DISTURBANCES: VERY MILD SENSITIVITY
TOTAL SCORE: 8
ANXIETY: NO ANXIETY (AT EASE)
AGITATION: *
AGITATION: *
TREMOR: TREMOR NOT VISIBLE BUT CAN BE FELT, FINGERTIP TO FINGERTIP
VISUAL DISTURBANCES: NOT PRESENT
ORIENTATION AND CLOUDING OF SENSORIUM: ORIENTED AND CAN DO SERIAL ADDITIONS
NAUSEA AND VOMITING: NO NAUSEA AND NO VOMITING

## 2023-09-30 ASSESSMENT — COGNITIVE AND FUNCTIONAL STATUS - GENERAL
DAILY ACTIVITIY SCORE: 24
CLIMB 3 TO 5 STEPS WITH RAILING: A LITTLE
MOBILITY SCORE: 23
SUGGESTED CMS G CODE MODIFIER DAILY ACTIVITY: CH
SUGGESTED CMS G CODE MODIFIER MOBILITY: CI

## 2023-09-30 ASSESSMENT — ENCOUNTER SYMPTOMS: TREMORS: 1

## 2023-09-30 ASSESSMENT — FIBROSIS 4 INDEX: FIB4 SCORE: 8.5

## 2023-09-30 NOTE — PROGRESS NOTES
NOC HOSPITALIST CROSS COVER    Notified by RN of potassium level of 2.9.      #Hypokalemia  -Replete with K-dur 40 mEq PO every 4 hours x 2 doses for a total of 80 mEq        ________________________________________________________________________  Electronically signed by:  MICHELE Jaimes, AGACNP-BC

## 2023-09-30 NOTE — CARE PLAN
Problem: Knowledge Deficit - Standard  Goal: Patient and family/care givers will demonstrate understanding of plan of care, disease process/condition, diagnostic tests and medications  Outcome: Progressing     Problem: Optimal Care for Alcohol Withdrawal  Goal: Optimal Care for the alcohol withdrawal patient  Outcome: Progressing     Problem: Seizure Precautions  Goal: Implementation of seizure precautions  Outcome: Progressing     Problem: Lifestyle Changes  Goal: Patient's ability to identify lifestyle changes and available resources to help reduce recurrence of condition will improve  Outcome: Progressing     Problem: Risk for Aspiration  Goal: Patient's risk for aspiration will be absent or decrease  Outcome: Progressing     Problem: Fall Risk  Goal: Patient will remain free from falls  Outcome: Progressing   The patient is Stable - Low risk of patient condition declining or worsening    Shift Goals  Clinical Goals: CIWA, monitor HR  Patient Goals: REST  Family Goals: BRENDAN    Progress made toward(s) clinical / shift goals:  yes    Patient is not progressing towards the following goals:

## 2023-09-30 NOTE — CARE PLAN
The patient is Stable - Low risk of patient condition declining or worsening    Shift Goals  Clinical Goals: WNL V/S, CIWA, CHECK LAB VALUES  Patient Goals: REST  Family Goals: BRENDAN    Progress made toward(s) clinical / shift goals:      Problem: Knowledge Deficit - Standard  Goal: Patient and family/care givers will demonstrate understanding of plan of care, disease process/condition, diagnostic tests and medications  Description: Target End Date:  1-3 days or as soon as patient condition allows    Document in Patient Education    1.  Patient and family/caregiver oriented to unit, equipment, visitation policy and means for communicating concern  2.  Complete/review Learning Assessment  3.  Assess knowledge level of disease process/condition, treatment plan, diagnostic tests and medications  4.  Explain disease process/condition, treatment plan, diagnostic tests and medications  Outcome: Not Progressing       Patient is not progressing towards the following goals:      Problem: Knowledge Deficit - Standard  Goal: Patient and family/care givers will demonstrate understanding of plan of care, disease process/condition, diagnostic tests and medications  Description: Target End Date:  1-3 days or as soon as patient condition allows    Document in Patient Education    1.  Patient and family/caregiver oriented to unit, equipment, visitation policy and means for communicating concern  2.  Complete/review Learning Assessment  3.  Assess knowledge level of disease process/condition, treatment plan, diagnostic tests and medications  4.  Explain disease process/condition, treatment plan, diagnostic tests and medications  Outcome: Not Progressing     Problem: Optimal Care for Alcohol Withdrawal  Goal: Optimal Care for the alcohol withdrawal patient  Description: Target End Date:  1 to 3 days    1.  Alcohol history screening done on admission  2.  CIWA-AR score assessment (includes assessment of nausea/vomiting, tremor, sweats,  anxiety, agitation, tactile, visual and auditory disturbances, headache and orientation/sensorium).  Document on CIWA flowsheet.  3.  Follow CIWA-AR score protocol  4.  Frequent reorientation  5.  Monitor for fluid and electrolyte imbalance.  6.  Assess for respiratory depression due to sedation (pulse ox)  7.  Consider thiamine, multivitamins, folic acid and magnesium sulfate per provider order  8.  Collaborate with Social Workers/Case Management  9.  Collaborate with mental health  Outcome: Not Progressing     Problem: Lifestyle Changes  Goal: Patient's ability to identify lifestyle changes and available resources to help reduce recurrence of condition will improve  Description: Target End Date:  1 to 3 days    1.  Discuss recommended lifestyle changes  2.  Identify available resources and support systems  3.  Consider referral to substance abuse program  Outcome: Not Progressing     Problem: Psychosocial  Goal: Patient's level of anxiety will decrease  Description: Target End Date:  1-3 days or as soon as patient condition allows    1.  Collaborate with patient and family/caregiver to identify triggers and develop strategies to cope with anxiety  2.  Implement stimuli reduction, calming techniques  3.  Pharmacologic management per provider order  4.  Encourage patient/family/care giver participation  5.  Collaborate with interdisciplinary team including Psychologist or Behavioral Health Team as needed  Outcome: Not Progressing

## 2023-09-30 NOTE — PROGRESS NOTES
Bedside report received. No reports of pain. POC discussed with pt. Pt verbalizes understanding. Call light and belongings within reach. Bed locked and in lowest position. Alarm and fall precautions in place.  CIWA protocol.

## 2023-09-30 NOTE — PROGRESS NOTES
Assumed care on patient, alert and oriented x 4, calm at this time, slight tremors noted on hands. Denying headache, hallucinations or pain. On RA and breathing evenly and unlabored. Tele on and heart rhythm and rate checked on monitor.    Plan of care : CIWA, monitor v/s, lab values, maintain safety.    Safety measures are padded side rails up x 3, bed to lowest level and alarm on. Appropriate to use call light for needs and assistance.

## 2023-09-30 NOTE — PROGRESS NOTES
Hospital Medicine Daily Progress Note    Date of Service  9/30/2023    Chief Complaint  Maxime Fernandez is a 37 y.o. male admitted 9/28/2023 with alcohol withdrawal    Hospital Course  37 y.o. male prior alcoholic sober for 3 years reports he started drinking again .  Since 9/16 who presented 9/28/2023 with visual hallucinations, tremors and anxiety.  Reports he drinks 750 mL of whiskey since 16 September.  Currently denies any chest pain or abdominal pain.  He is not on any blood thinners.  Denies using any recreational drugs.  He denies any suicidal or homicidal ideation.      In the ER, he was noted to have low potassium, high anion gap given IV fluids and electrolytes were repleted.  He will be admitted for acute alcohol withdrawal  requiring inpatient treatment.    Interval Problem Update  Seen patient at bedside  Aox4  Tachycardic, noted +  I ordered EKG  Check TSH  Noted fever 101.1 last night   K 2.9, replaced  Cont IVF with KCL    I have discussed this patient's plan of care and discharge plan at IDT rounds today with Case Management, Nursing, Nursing leadership, and other members of the IDT team.    Consultants/Specialty  na    Code Status  Full Code    Disposition  The patient is not medically cleared for discharge to home or a post-acute facility.      I have placed the appropriate orders for post-discharge needs.    Review of Systems  Review of Systems   Constitutional:  Positive for malaise/fatigue.   Neurological:  Positive for tremors.   All other systems reviewed and are negative.       Physical Exam  Temp:  [36.4 °C (97.5 °F)-38.4 °C (101.1 °F)] 37.2 °C (99 °F)  Pulse:  [108-129] 115  Resp:  [17-19] 18  BP: (135-148)/() 142/100  SpO2:  [92 %-94 %] 94 %    Physical Exam  Vitals and nursing note reviewed.   Constitutional:       Appearance: Normal appearance. He is ill-appearing.   HENT:      Head: Normocephalic and atraumatic.      Mouth/Throat:      Pharynx: Oropharynx is clear.    Eyes:      Pupils: Pupils are equal, round, and reactive to light.   Neck:      Vascular: No carotid bruit.   Cardiovascular:      Rate and Rhythm: Regular rhythm. Tachycardia present.   Pulmonary:      Effort: Pulmonary effort is normal.      Breath sounds: Normal breath sounds.   Abdominal:      General: Abdomen is flat. Bowel sounds are normal. There is no distension.      Palpations: Abdomen is soft. There is no mass.      Tenderness: There is no abdominal tenderness.   Musculoskeletal:         General: Normal range of motion.      Cervical back: Neck supple.      Comments: Noted tremors in both hands   Skin:     General: Skin is warm and dry.   Neurological:      General: No focal deficit present.      Mental Status: He is alert and oriented to person, place, and time.   Psychiatric:         Mood and Affect: Mood normal.         Behavior: Behavior normal.         Fluids    Intake/Output Summary (Last 24 hours) at 9/30/2023 1600  Last data filed at 9/30/2023 1500  Gross per 24 hour   Intake 2080 ml   Output 3700 ml   Net -1620 ml         Laboratory  Recent Labs     09/28/23  1844 09/30/23  0310   WBC 8.0 6.7   RBC 5.52 5.23   HEMOGLOBIN 17.1 15.7   HEMATOCRIT 45.8 43.1   MCV 83.0 82.4   MCH 31.0 30.0   MCHC 37.3* 36.4   RDW 37.4 36.7   PLATELETCT 168 98*   MPV 10.6 11.3       Recent Labs     09/29/23  0229 09/29/23  0849 09/30/23  0310   SODIUM 134* 131* 132*   POTASSIUM 2.9* 3.2* 2.9*   CHLORIDE 97 94* 96   CO2 28 25 23   GLUCOSE 125* 121* 116*   BUN 12 10 5*   CREATININE 0.81 0.86 0.94   CALCIUM 7.8* 7.7* 8.2*                     Imaging  No orders to display          Assessment/Plan  * Alcohol withdrawal with perceptual disturbances (HCC)- (present on admission)  Assessment & Plan  CIWA protocol  Continue with IV fluids  Supplement magnesium and potassium  Monitor for DTs  Seizure precautions  Encourage sobriety  Community resources provided to patient  I have offerred addition medicine Dr. Ramirez  outpatient follow up. Patient agreed. Outpatient referral placed.   9/30:  Continue ciwa protocol with po and iv ativan prn   Noted tachycardia. Check TSH    High anion gap metabolic acidosis  Assessment & Plan  IV Fluids     Elevated LFTs- (present on admission)  Assessment & Plan  Likely due to alcohol abuse  Hepatitis panel negative  Cont monitoring    Hypophosphatemia- (present on admission)  Assessment & Plan  Replace as indicated     Hypomagnesemia- (present on admission)  Assessment & Plan  IV mag 2 gram supplemented    Hypokalemia- (present on admission)  Assessment & Plan  Critical low K 2.9  Replaced as indicated  Check Mag         VTE prophylaxis:    enoxaparin ppx      I have performed a physical exam and reviewed and updated ROS and Plan today (9/30/2023). In review of yesterday's note (9/29/2023), there are no changes except as documented above.    Patient is has a high medical complexity, complex decision making and is at high risk for complication, morbidity, and mortality.  I spent 52 minutes, reviewing the chart, obtaining and/or reviewing separately obtained history. Performing a medically appropriate examination and evaluation.  Counseling and educating the patient. Ordering and reviewing medications, tests, or procedures.  Documenting clinical information in EPIC. Independently interpreting results and communicating results to patient. Discussing future disposition of care with patient, RN and case management.  This does not include time spent on separately billable procedures/tests.

## 2023-10-01 VITALS
RESPIRATION RATE: 16 BRPM | OXYGEN SATURATION: 94 % | BODY MASS INDEX: 30.56 KG/M2 | TEMPERATURE: 98.8 F | HEIGHT: 71 IN | WEIGHT: 218.26 LBS | DIASTOLIC BLOOD PRESSURE: 94 MMHG | SYSTOLIC BLOOD PRESSURE: 132 MMHG | HEART RATE: 115 BPM

## 2023-10-01 LAB
ANION GAP SERPL CALC-SCNC: 10 MMOL/L (ref 7–16)
BUN SERPL-MCNC: 7 MG/DL (ref 8–22)
CALCIUM SERPL-MCNC: 8.2 MG/DL (ref 8.5–10.5)
CHLORIDE SERPL-SCNC: 99 MMOL/L (ref 96–112)
CO2 SERPL-SCNC: 23 MMOL/L (ref 20–33)
CREAT SERPL-MCNC: 0.85 MG/DL (ref 0.5–1.4)
ERYTHROCYTE [DISTWIDTH] IN BLOOD BY AUTOMATED COUNT: 39 FL (ref 35.9–50)
GFR SERPLBLD CREATININE-BSD FMLA CKD-EPI: 113 ML/MIN/1.73 M 2
GLUCOSE SERPL-MCNC: 111 MG/DL (ref 65–99)
HCT VFR BLD AUTO: 40.6 % (ref 42–52)
HGB BLD-MCNC: 14.7 G/DL (ref 14–18)
MAGNESIUM SERPL-MCNC: 1.7 MG/DL (ref 1.5–2.5)
MCH RBC QN AUTO: 30.6 PG (ref 27–33)
MCHC RBC AUTO-ENTMCNC: 36.2 G/DL (ref 32.3–36.5)
MCV RBC AUTO: 84.6 FL (ref 81.4–97.8)
PHOSPHATE SERPL-MCNC: 2.8 MG/DL (ref 2.5–4.5)
PLATELET # BLD AUTO: 90 K/UL (ref 164–446)
PMV BLD AUTO: 11.1 FL (ref 9–12.9)
POTASSIUM SERPL-SCNC: 3.4 MMOL/L (ref 3.6–5.5)
RBC # BLD AUTO: 4.8 M/UL (ref 4.7–6.1)
SODIUM SERPL-SCNC: 132 MMOL/L (ref 135–145)
WBC # BLD AUTO: 6 K/UL (ref 4.8–10.8)

## 2023-10-01 PROCEDURE — 84100 ASSAY OF PHOSPHORUS: CPT

## 2023-10-01 PROCEDURE — 99239 HOSP IP/OBS DSCHRG MGMT >30: CPT | Performed by: STUDENT IN AN ORGANIZED HEALTH CARE EDUCATION/TRAINING PROGRAM

## 2023-10-01 PROCEDURE — 36415 COLL VENOUS BLD VENIPUNCTURE: CPT

## 2023-10-01 PROCEDURE — 85027 COMPLETE CBC AUTOMATED: CPT

## 2023-10-01 PROCEDURE — A9270 NON-COVERED ITEM OR SERVICE: HCPCS | Performed by: STUDENT IN AN ORGANIZED HEALTH CARE EDUCATION/TRAINING PROGRAM

## 2023-10-01 PROCEDURE — 80048 BASIC METABOLIC PNL TOTAL CA: CPT

## 2023-10-01 PROCEDURE — 700102 HCHG RX REV CODE 250 W/ 637 OVERRIDE(OP): Performed by: STUDENT IN AN ORGANIZED HEALTH CARE EDUCATION/TRAINING PROGRAM

## 2023-10-01 PROCEDURE — 83735 ASSAY OF MAGNESIUM: CPT

## 2023-10-01 PROCEDURE — 700101 HCHG RX REV CODE 250: Performed by: STUDENT IN AN ORGANIZED HEALTH CARE EDUCATION/TRAINING PROGRAM

## 2023-10-01 RX ORDER — FOLIC ACID 1 MG/1
1 TABLET ORAL DAILY
Qty: 30 TABLET | Refills: 0 | Status: SHIPPED | OUTPATIENT
Start: 2023-10-02

## 2023-10-01 RX ORDER — LANOLIN ALCOHOL/MO/W.PET/CERES
100 CREAM (GRAM) TOPICAL DAILY
Qty: 30 TABLET | Refills: 0 | Status: SHIPPED | OUTPATIENT
Start: 2023-10-02 | End: 2023-11-01

## 2023-10-01 RX ORDER — POTASSIUM CHLORIDE 20 MEQ/1
40 TABLET, EXTENDED RELEASE ORAL ONCE
Status: COMPLETED | OUTPATIENT
Start: 2023-10-01 | End: 2023-10-01

## 2023-10-01 RX ADMIN — THERA TABS 1 TABLET: TAB at 04:05

## 2023-10-01 RX ADMIN — LORAZEPAM 1 MG: 1 TABLET ORAL at 08:01

## 2023-10-01 RX ADMIN — LORAZEPAM 0.5 MG: 0.5 TABLET ORAL at 04:05

## 2023-10-01 RX ADMIN — POTASSIUM CHLORIDE AND SODIUM CHLORIDE: 900; 300 INJECTION, SOLUTION INTRAVENOUS at 04:08

## 2023-10-01 RX ADMIN — Medication 100 MG: at 04:05

## 2023-10-01 RX ADMIN — FOLIC ACID 1 MG: 1 TABLET ORAL at 04:05

## 2023-10-01 RX ADMIN — POTASSIUM CHLORIDE 40 MEQ: 1500 TABLET, EXTENDED RELEASE ORAL at 08:17

## 2023-10-01 ASSESSMENT — LIFESTYLE VARIABLES
VISUAL DISTURBANCES: NOT PRESENT
NAUSEA AND VOMITING: NO NAUSEA AND NO VOMITING
TREMOR: TREMOR NOT VISIBLE BUT CAN BE FELT, FINGERTIP TO FINGERTIP
AUDITORY DISTURBANCES: NOT PRESENT
TOTAL SCORE: 5
PAROXYSMAL SWEATS: BEADS OF SWEAT OBVIOUS ON FOREHEAD
VISUAL DISTURBANCES: NOT PRESENT
TREMOR: TREMOR NOT VISIBLE BUT CAN BE FELT, FINGERTIP TO FINGERTIP
ANXIETY: *
AUDITORY DISTURBANCES: NOT PRESENT
HEADACHE, FULLNESS IN HEAD: NOT PRESENT
ANXIETY: *
TOTAL SCORE: 9
HEADACHE, FULLNESS IN HEAD: MILD
ORIENTATION AND CLOUDING OF SENSORIUM: ORIENTED AND CAN DO SERIAL ADDITIONS
AGITATION: SOMEWHAT MORE THAN NORMAL ACTIVITY
ORIENTATION AND CLOUDING OF SENSORIUM: ORIENTED AND CAN DO SERIAL ADDITIONS
PAROXYSMAL SWEATS: BARELY PERCEPTIBLE SWEATING. PALMS MOIST
NAUSEA AND VOMITING: NO NAUSEA AND NO VOMITING
AGITATION: NORMAL ACTIVITY

## 2023-10-01 NOTE — DISCHARGE SUMMARY
Discharge Summary    CHIEF COMPLAINT ON ADMISSION  Chief Complaint   Patient presents with    ETOH Withdrawal     Pt was previously sober for three years, but relapsed and heaving drinking since 9/16. Pt was sent in by nurse hotline after calling them for advice. Pt reports being very tremulous at home as well as having visual hallucinations and took a shot right before coming to the ER.       Reason for Admission  DETOX      Admission Date  9/28/2023    CODE STATUS  Full Code    HPI & HOSPITAL COURSE  37 y.o. male prior alcoholic sober for 3 years reports he started drinking again since 9/16, who presented 9/28/2023 with visual hallucinations, tremors and anxiety.  Reports he drinks 750 mL of whiskey since 16 September.  Currently denies any chest pain or abdominal pain.  He denies any suicidal or homicidal ideation.      In the ER, he was noted to have low potassium, high anion gap given IV fluids and electrolytes were repleted.  He is on ciwa protocol and received MV, B1 and folic acid. Alcohol cessation discussed with patient. Community resources provided the patient. I also discussed addition medicine Dr. Ramirez outpatient follow up. Patient agreed. Outpatient referral placed.     On the day of discharge, his vital stable, electrolytes stable.  Minimal ciwa score. Tolerating diet. No nausea or vomiting. He has medical decision capacity.     Therefore, he is discharged in good and stable condition to home with close outpatient follow-up.    The patient met 2-midnight criteria for an inpatient stay at the time of discharge.    Discharge Date  10/1/23    FOLLOW UP ITEMS POST DISCHARGE  PCP  Behavior health Dr. Ramirez for alcohol addiction    DISCHARGE DIAGNOSES  Principal Problem:    Alcohol withdrawal with perceptual disturbances (HCC) (POA: Yes)  Active Problems:    Hypokalemia (POA: Yes)    Hypomagnesemia (POA: Yes)    Hypophosphatemia (POA: Yes)    Elevated LFTs (POA: Yes)    High anion gap metabolic  acidosis (POA: Unknown)  Resolved Problems:    * No resolved hospital problems. *      FOLLOW UP  No future appointments.  No follow-up provider specified.    MEDICATIONS ON DISCHARGE     Medication List        START taking these medications        Instructions   folic acid 1 MG Tabs  Start taking on: October 2, 2023  Commonly known as: Folvite   Take 1 Tablet by mouth every day.  Dose: 1 mg     multivitamin Tabs  Start taking on: October 2, 2023   Take 1 Tablet by mouth every day.  Dose: 1 Tablet     thiamine 100 MG tablet  Start taking on: October 2, 2023  Commonly known as: Thiamine   Take 1 Tablet by mouth every day for 30 days.  Dose: 100 mg              Allergies  No Known Allergies    DIET  Orders Placed This Encounter   Procedures    Diet Order Diet: Low Fiber(GI Soft)     Standing Status:   Standing     Number of Occurrences:   1     Order Specific Question:   Diet:     Answer:   Low Fiber(GI Soft) [2]       ACTIVITY  As tolerated.  Weight bearing as tolerated    CONSULTATIONS  na    PROCEDURES  na    LABORATORY  Lab Results   Component Value Date    SODIUM 132 (L) 10/01/2023    POTASSIUM 3.4 (L) 10/01/2023    CHLORIDE 99 10/01/2023    CO2 23 10/01/2023    GLUCOSE 111 (H) 10/01/2023    BUN 7 (L) 10/01/2023    CREATININE 0.85 10/01/2023        Lab Results   Component Value Date    WBC 6.0 10/01/2023    HEMOGLOBIN 14.7 10/01/2023    HEMATOCRIT 40.6 (L) 10/01/2023    PLATELETCT 90 (L) 10/01/2023      No orders to display       Total time of the discharge process 32 minutes.

## 2023-10-01 NOTE — DISCHARGE INSTRUCTIONS
Discharge Instructions per Vincent Lawrence M.D.    Please follow-up with PCP as outpatient.  Stop alcohol-- very important  I have ordered multivitamin, B1 and folate. Please take daily  I have referred behavior health alcohol addiction clinic for you    Return to ER in the event of new or worsening symptoms. Please note importance of compliance and the patient has agreed to proceed with all medical recommendations and follow up plan indicated above. All medications come with benefits and risks. Risks may include permanent injury or death and these risks can be minimized with close reassessment and monitoring.

## 2023-10-01 NOTE — PROGRESS NOTES
Assumed care on patient, easily arousable, respiration even and unlabored. On RA. Tele on and heart rhythm and rate checked on monitor. Denies pain/needs at this time. Plan of care discussed. Continue CIWA, monitor v/s, check lab values and maintain safety. Safety measures are padded side rails x 3, bed to lowest level, bed alarm on. Instructed to use call light for assistance and verbalizes understanding. Will round hourly and as needed.

## 2023-10-01 NOTE — CARE PLAN
The patient is Stable - Low risk of patient condition declining or worsening    Shift Goals  Clinical Goals: CIWA, check lab values, WNL v/s  Patient Goals: rest  Family Goals: BRENDAN    Problem: Knowledge Deficit - Standard  Goal: Patient and family/care givers will demonstrate understanding of plan of care, disease process/condition, diagnostic tests and medications  Description: Target End Date:  1-3 days or as soon as patient condition allows    Document in Patient Education    1.  Patient and family/caregiver oriented to unit, equipment, visitation policy and means for communicating concern  2.  Complete/review Learning Assessment  3.  Assess knowledge level of disease process/condition, treatment plan, diagnostic tests and medications  4.  Explain disease process/condition, treatment plan, diagnostic tests and medications  Outcome: Progressing       Progress made toward(s) clinical / shift goals:      Patient is not progressing towards the following goals:

## 2023-10-01 NOTE — CARE PLAN
Problem: Knowledge Deficit - Standard  Goal: Patient and family/care givers will demonstrate understanding of plan of care, disease process/condition, diagnostic tests and medications  Outcome: Progressing     Problem: Optimal Care for Alcohol Withdrawal  Goal: Optimal Care for the alcohol withdrawal patient  Outcome: Progressing   The patient is Stable - Low risk of patient condition declining or worsening    Shift Goals  Clinical Goals: CIWA  Patient Goals: rest  Family Goals: BRENDAN    Progress made toward(s) clinical / shift goals:  yes    Patient is not progressing towards the following goals:

## 2023-10-01 NOTE — PROGRESS NOTES
Patient discharged home. Tele box removed. Discharge education provided. All questions addressed. Niece will take patient home.

## 2023-10-05 ENCOUNTER — TELEPHONE (OUTPATIENT)
Dept: HEALTH INFORMATION MANAGEMENT | Facility: OTHER | Age: 38
End: 2023-10-05

## 2023-11-29 SDOH — ECONOMIC STABILITY: HOUSING INSECURITY
IN THE LAST 12 MONTHS, WAS THERE A TIME WHEN YOU DID NOT HAVE A STEADY PLACE TO SLEEP OR SLEPT IN A SHELTER (INCLUDING NOW)?: NO

## 2023-11-29 SDOH — ECONOMIC STABILITY: TRANSPORTATION INSECURITY
IN THE PAST 12 MONTHS, HAS LACK OF RELIABLE TRANSPORTATION KEPT YOU FROM MEDICAL APPOINTMENTS, MEETINGS, WORK OR FROM GETTING THINGS NEEDED FOR DAILY LIVING?: NO

## 2023-11-29 SDOH — ECONOMIC STABILITY: TRANSPORTATION INSECURITY
IN THE PAST 12 MONTHS, HAS LACK OF TRANSPORTATION KEPT YOU FROM MEETINGS, WORK, OR FROM GETTING THINGS NEEDED FOR DAILY LIVING?: NO

## 2023-11-29 SDOH — HEALTH STABILITY: PHYSICAL HEALTH: ON AVERAGE, HOW MANY MINUTES DO YOU ENGAGE IN EXERCISE AT THIS LEVEL?: 30 MIN

## 2023-11-29 SDOH — HEALTH STABILITY: PHYSICAL HEALTH: ON AVERAGE, HOW MANY DAYS PER WEEK DO YOU ENGAGE IN MODERATE TO STRENUOUS EXERCISE (LIKE A BRISK WALK)?: 3 DAYS

## 2023-11-29 SDOH — ECONOMIC STABILITY: FOOD INSECURITY: WITHIN THE PAST 12 MONTHS, THE FOOD YOU BOUGHT JUST DIDN'T LAST AND YOU DIDN'T HAVE MONEY TO GET MORE.: NEVER TRUE

## 2023-11-29 SDOH — ECONOMIC STABILITY: INCOME INSECURITY: HOW HARD IS IT FOR YOU TO PAY FOR THE VERY BASICS LIKE FOOD, HOUSING, MEDICAL CARE, AND HEATING?: NOT VERY HARD

## 2023-11-29 SDOH — ECONOMIC STABILITY: HOUSING INSECURITY: IN THE LAST 12 MONTHS, HOW MANY PLACES HAVE YOU LIVED?: 1

## 2023-11-29 SDOH — ECONOMIC STABILITY: TRANSPORTATION INSECURITY
IN THE PAST 12 MONTHS, HAS THE LACK OF TRANSPORTATION KEPT YOU FROM MEDICAL APPOINTMENTS OR FROM GETTING MEDICATIONS?: NO

## 2023-11-29 SDOH — ECONOMIC STABILITY: INCOME INSECURITY: IN THE LAST 12 MONTHS, WAS THERE A TIME WHEN YOU WERE NOT ABLE TO PAY THE MORTGAGE OR RENT ON TIME?: NO

## 2023-11-29 SDOH — ECONOMIC STABILITY: FOOD INSECURITY: WITHIN THE PAST 12 MONTHS, YOU WORRIED THAT YOUR FOOD WOULD RUN OUT BEFORE YOU GOT MONEY TO BUY MORE.: NEVER TRUE

## 2023-11-29 SDOH — HEALTH STABILITY: MENTAL HEALTH
STRESS IS WHEN SOMEONE FEELS TENSE, NERVOUS, ANXIOUS, OR CAN'T SLEEP AT NIGHT BECAUSE THEIR MIND IS TROUBLED. HOW STRESSED ARE YOU?: TO SOME EXTENT

## 2023-11-29 ASSESSMENT — LIFESTYLE VARIABLES
HOW OFTEN DO YOU HAVE SIX OR MORE DRINKS ON ONE OCCASION: LESS THAN MONTHLY
SKIP TO QUESTIONS 9-10: 0
AUDIT-C TOTAL SCORE: 6
HOW MANY STANDARD DRINKS CONTAINING ALCOHOL DO YOU HAVE ON A TYPICAL DAY: 10 OR MORE
HOW OFTEN DO YOU HAVE A DRINK CONTAINING ALCOHOL: MONTHLY OR LESS

## 2023-11-29 ASSESSMENT — SOCIAL DETERMINANTS OF HEALTH (SDOH)
HOW OFTEN DO YOU HAVE SIX OR MORE DRINKS ON ONE OCCASION: LESS THAN MONTHLY
IN A TYPICAL WEEK, HOW MANY TIMES DO YOU TALK ON THE PHONE WITH FAMILY, FRIENDS, OR NEIGHBORS?: MORE THAN THREE TIMES A WEEK
HOW OFTEN DO YOU HAVE A DRINK CONTAINING ALCOHOL: MONTHLY OR LESS
DO YOU BELONG TO ANY CLUBS OR ORGANIZATIONS SUCH AS CHURCH GROUPS UNIONS, FRATERNAL OR ATHLETIC GROUPS, OR SCHOOL GROUPS?: NO
HOW HARD IS IT FOR YOU TO PAY FOR THE VERY BASICS LIKE FOOD, HOUSING, MEDICAL CARE, AND HEATING?: NOT VERY HARD
HOW OFTEN DO YOU ATTEND CHURCH OR RELIGIOUS SERVICES?: MORE THAN 4 TIMES PER YEAR
HOW OFTEN DO YOU ATTENT MEETINGS OF THE CLUB OR ORGANIZATION YOU BELONG TO?: NEVER
IN A TYPICAL WEEK, HOW MANY TIMES DO YOU TALK ON THE PHONE WITH FAMILY, FRIENDS, OR NEIGHBORS?: MORE THAN THREE TIMES A WEEK
HOW OFTEN DO YOU GET TOGETHER WITH FRIENDS OR RELATIVES?: MORE THAN THREE TIMES A WEEK
WITHIN THE PAST 12 MONTHS, YOU WORRIED THAT YOUR FOOD WOULD RUN OUT BEFORE YOU GOT THE MONEY TO BUY MORE: NEVER TRUE
DO YOU BELONG TO ANY CLUBS OR ORGANIZATIONS SUCH AS CHURCH GROUPS UNIONS, FRATERNAL OR ATHLETIC GROUPS, OR SCHOOL GROUPS?: NO
ARE YOU MARRIED, WIDOWED, DIVORCED, SEPARATED, NEVER MARRIED, OR LIVING WITH A PARTNER?: LIVING WITH PARTNER
HOW OFTEN DO YOU ATTEND CHURCH OR RELIGIOUS SERVICES?: MORE THAN 4 TIMES PER YEAR
HOW MANY DRINKS CONTAINING ALCOHOL DO YOU HAVE ON A TYPICAL DAY WHEN YOU ARE DRINKING: 10 OR MORE
HOW OFTEN DO YOU GET TOGETHER WITH FRIENDS OR RELATIVES?: MORE THAN THREE TIMES A WEEK
HOW OFTEN DO YOU ATTENT MEETINGS OF THE CLUB OR ORGANIZATION YOU BELONG TO?: NEVER
ARE YOU MARRIED, WIDOWED, DIVORCED, SEPARATED, NEVER MARRIED, OR LIVING WITH A PARTNER?: LIVING WITH PARTNER

## 2023-11-30 ENCOUNTER — OFFICE VISIT (OUTPATIENT)
Dept: MEDICAL GROUP | Facility: PHYSICIAN GROUP | Age: 38
End: 2023-11-30
Attending: STUDENT IN AN ORGANIZED HEALTH CARE EDUCATION/TRAINING PROGRAM
Payer: COMMERCIAL

## 2023-11-30 VITALS
TEMPERATURE: 97.7 F | OXYGEN SATURATION: 95 % | HEART RATE: 96 BPM | DIASTOLIC BLOOD PRESSURE: 78 MMHG | SYSTOLIC BLOOD PRESSURE: 128 MMHG | HEIGHT: 71 IN | WEIGHT: 207.25 LBS | BODY MASS INDEX: 29.02 KG/M2 | RESPIRATION RATE: 16 BRPM

## 2023-11-30 DIAGNOSIS — F41.0 PANIC DISORDER: ICD-10-CM

## 2023-11-30 DIAGNOSIS — F10.21 ALCOHOL DEPENDENCE IN EARLY FULL REMISSION (HCC): ICD-10-CM

## 2023-11-30 DIAGNOSIS — K21.9 GASTROESOPHAGEAL REFLUX DISEASE, UNSPECIFIED WHETHER ESOPHAGITIS PRESENT: ICD-10-CM

## 2023-11-30 DIAGNOSIS — R53.83 OTHER FATIGUE: ICD-10-CM

## 2023-11-30 DIAGNOSIS — E55.9 VITAMIN D DEFICIENCY: ICD-10-CM

## 2023-11-30 DIAGNOSIS — Z11.3 SCREENING EXAMINATION FOR SEXUALLY TRANSMITTED DISEASE: ICD-10-CM

## 2023-11-30 DIAGNOSIS — Z13.6 SCREENING FOR CARDIOVASCULAR CONDITION: ICD-10-CM

## 2023-11-30 DIAGNOSIS — R79.89 ELEVATED LFTS: ICD-10-CM

## 2023-11-30 DIAGNOSIS — D69.6 THROMBOCYTOPENIA (HCC): ICD-10-CM

## 2023-11-30 DIAGNOSIS — F10.932: ICD-10-CM

## 2023-11-30 PROBLEM — M79.10 MYALGIA: Status: RESOLVED | Noted: 2017-12-29 | Resolved: 2023-11-30

## 2023-11-30 PROBLEM — E83.42 HYPOMAGNESEMIA: Status: RESOLVED | Noted: 2019-10-03 | Resolved: 2023-11-30

## 2023-11-30 PROBLEM — E83.39 HYPOPHOSPHATEMIA: Status: RESOLVED | Noted: 2019-10-03 | Resolved: 2023-11-30

## 2023-11-30 PROBLEM — H61.22 IMPACTED CERUMEN OF LEFT EAR: Status: RESOLVED | Noted: 2017-12-29 | Resolved: 2023-11-30

## 2023-11-30 PROBLEM — R11.2 NON-INTRACTABLE VOMITING WITH NAUSEA: Status: RESOLVED | Noted: 2019-10-03 | Resolved: 2023-11-30

## 2023-11-30 PROBLEM — E87.6 HYPOKALEMIA: Status: RESOLVED | Noted: 2019-10-03 | Resolved: 2023-11-30

## 2023-11-30 PROBLEM — E87.29 HIGH ANION GAP METABOLIC ACIDOSIS: Status: RESOLVED | Noted: 2023-09-29 | Resolved: 2023-11-30

## 2023-11-30 PROCEDURE — 3074F SYST BP LT 130 MM HG: CPT | Performed by: PHYSICIAN ASSISTANT

## 2023-11-30 PROCEDURE — 3078F DIAST BP <80 MM HG: CPT | Performed by: PHYSICIAN ASSISTANT

## 2023-11-30 PROCEDURE — 99214 OFFICE O/P EST MOD 30 MIN: CPT | Performed by: PHYSICIAN ASSISTANT

## 2023-11-30 RX ORDER — POTASSIUM CHLORIDE 750 MG/1
CAPSULE, EXTENDED RELEASE ORAL
COMMUNITY
Start: 2023-10-23 | End: 2023-11-30

## 2023-11-30 RX ORDER — PANTOPRAZOLE SODIUM 40 MG/1
40 TABLET, DELAYED RELEASE ORAL
COMMUNITY
Start: 2023-10-23 | End: 2023-11-30 | Stop reason: SDUPTHER

## 2023-11-30 RX ORDER — ONDANSETRON 4 MG/1
4 TABLET, FILM COATED ORAL EVERY 8 HOURS PRN
COMMUNITY
Start: 2023-10-23 | End: 2023-11-30

## 2023-11-30 RX ORDER — PANTOPRAZOLE SODIUM 40 MG/1
40 TABLET, DELAYED RELEASE ORAL
Qty: 90 TABLET | Refills: 3 | Status: SHIPPED | OUTPATIENT
Start: 2023-11-30

## 2023-11-30 RX ORDER — GLUCOSA SU 2KCL/CHONDROITIN SU 500-400 MG
1 CAPSULE ORAL
COMMUNITY
Start: 2023-10-23

## 2023-11-30 RX ORDER — GABAPENTIN 300 MG/1
CAPSULE ORAL
COMMUNITY
Start: 2023-10-23 | End: 2023-11-30

## 2023-11-30 RX ORDER — AMLODIPINE BESYLATE 5 MG/1
10 TABLET ORAL
COMMUNITY
Start: 2023-10-23 | End: 2023-11-30

## 2023-11-30 RX ORDER — PROPRANOLOL HYDROCHLORIDE 10 MG/1
10 TABLET ORAL 3 TIMES DAILY PRN
Qty: 30 TABLET | Refills: 1 | Status: SHIPPED | OUTPATIENT
Start: 2023-11-30

## 2023-11-30 ASSESSMENT — ENCOUNTER SYMPTOMS
SHORTNESS OF BREATH: 0
CHILLS: 0
FEVER: 0

## 2023-11-30 ASSESSMENT — FIBROSIS 4 INDEX: FIB4 SCORE: 9.49

## 2023-11-30 NOTE — PROGRESS NOTES
"Subjective:     CC:       HPI:   Maxime presents today with the following:      Problem   Alcohol Dependence in Early Full Remission (Hcc)    Chronic, uncontrolled.  ER visit 9/28/2023:  Pt was previously sober for three years, but relapsed and heaving drinking since 9/16. Pt was sent in by nurse hotline after calling them for advice. Pt reports being very tremulous at home as well as having visual hallucinations and took a shot right before coming to the ER.     Admit 9/28/23-10/1/2023:    Tried [facility], denied him  EtOH [facility] 9/16-10/22 for detox  Inpatient 9/28-10/1  Started EtOH again 10/8, drank for around 12-13 days  Tried [facility] again but denied him  [facility] inpatient  D/c'd with gabapentin and BP meds    Still having equilibrium issues  Today is pretty bad, feels wobbly, like \"an old man\"  No room spinning  Ears are ringing   BP and HR has been steady    No EtOH since 10/18/2023 when he was admitted to [facility]    Going to AA regularly, sometimes 2-3 times/day  Does virtual AA and the [employer] locally  Has been seeing a therapist over the phone once/week.    Has good support at home with his wife, kids, mom, brother, and support work from his boss.     Gastroesophageal Reflux Disease    Chronic, uncontrolled.  Feeling a lot better on pantoprazole 40 mg daily.  Continue medication as directed.     Elevated Lfts    Chronic, uncontrolled.  Component      Latest Ref Rng 9/28/2023 9/29/2023 9/30/2023   AST(SGOT)      12 - 45 U/L 164 (H)  208 (H)  333 (H)    AST(SGOT)        183 (H)     ALT(SGPT)      2 - 50 U/L 125 (H)  148 (H)  231 (H)    ALT(SGPT)        128 (H)     Alkaline Phosphatase      30 - 99 U/L 78  81  96    Alkaline Phosphatase        67        Thrombocytopenia (Hcc)    Chronic, uncontrolled.  Component      Latest Ref Rng 9/30/2023 10/1/2023   Platelet Count      164 - 446 K/uL 98 (L)  90 (L)    Likely secondary to EtOH.     High Anion Gap Metabolic Acidosis (Resolved)   Hypokalemia (Resolved) " "  Hypomagnesemia (Resolved)   Hypophosphatemia (Resolved)   Non-Intractable Vomiting With Nausea (Resolved)   Myalgia (Resolved)   Impacted Cerumen of Left Ear (Resolved)         ROS:  Review of Systems   Constitutional:  Negative for chills and fever.   Respiratory:  Negative for shortness of breath.    Cardiovascular:  Negative for chest pain.       Objective:     Exam:  /78 (BP Location: Left arm, Patient Position: Sitting, BP Cuff Size: Large adult)   Pulse 96   Temp 36.5 °C (97.7 °F) (Temporal)   Resp 16   Ht 1.803 m (5' 11\")   Wt 94 kg (207 lb 4 oz)   SpO2 95%   BMI 28.91 kg/m²  Body mass index is 28.91 kg/m².    Physical Exam  Vitals reviewed.   Constitutional:       General: He is not in acute distress.     Appearance: Normal appearance.   HENT:      Right Ear: There is impacted cerumen.      Left Ear: There is impacted cerumen.   Pulmonary:      Effort: Pulmonary effort is normal.   Neurological:      General: No focal deficit present.      Mental Status: He is alert.   Psychiatric:         Mood and Affect: Mood normal.         Behavior: Behavior normal.         Judgment: Judgment normal.                   10/18/2023 [facility], [place]      Assessment & Plan:     38 y.o. male with the following -     1. Alcohol withdrawal with perceptual disturbances (HCC)  Chronic, resolved.  Patient continues without alcohol since 10/18/2023.  Patient is attending at least 1 AA meetings daily, usually virtually.  He attends AA meetings locally in person as well.  He has good support at home from his wife, kids, brother, mom, and his boss.  Referring patient for therapy in person.  He will continue therapy online weekly until he is able to transition to an in person therapist.  He will continue multivitamin, thiamine, and folic acid.  Checking labs.      - VITAMIN B12; Future  - MAGNESIUM; Future  - PHOSPHORUS; Future  - FOLATE; Future  - VITAMIN B1; Future  - Referral to Behavioral Health    2. Alcohol dependence in early " full remission (HCC)  See #1.    3. Panic disorder  See #1.  Also starting propranolol as needed and as directed.    - propranolol (INDERAL) 10 MG Tab; Take 1 Tablet by mouth 3 times a day as needed (anxiety).  Dispense: 30 Tablet; Refill: 1    4. Elevated LFTs  Chronic, uncontrolled.  Repeat labs.    - Comp Metabolic Panel; Future    5. Thrombocytopenia (HCC)  Chronic, uncontrolled.  Repeating labs.  Likely secondary to alcohol intake.    - CBC WITH DIFFERENTIAL; Future    6. Vitamin D deficiency  Chronic, control unknown.  Checking labs.    - VITAMIN D,25 HYDROXY (DEFICIENCY); Future    7. Gastroesophageal reflux disease, unspecified whether esophagitis present  Chronic, uncontrolled.  Continue pantoprazole 40 mg daily.    - pantoprazole (PROTONIX) 40 MG Tablet Delayed Response; Take 1 Tablet by mouth every day.  Dispense: 90 Tablet; Refill: 3    8. Other fatigue  Chronic, intermittent.  Checking labs.    - TSH WITH REFLEX TO FT4; Future    9. Screening examination for sexually transmitted disease    - HIV AG/AB COMBO ASSAY SCREENING; Future    10. Screening for cardiovascular condition    - Lipid Profile; Future      Attempted irrigation but worsened patient's dizziness.  Discussed soaking ears at home.    Referrals for:  Therapy.    Healthcare Maintenance:        Return in about 6 weeks (around 1/11/2024) for med check.    Please note that this dictation was created using voice recognition software. I have made every reasonable attempt to correct obvious errors, but I expect that there are errors of grammar and possibly content that I did not discover before finalizing the note.

## 2024-01-16 PROBLEM — F10.231 ALCOHOL DEPENDENCE WITH WITHDRAWAL DELIRIUM (HCC): Status: RESOLVED | Noted: 2019-10-03 | Resolved: 2024-01-16

## 2024-10-01 ENCOUNTER — OFFICE VISIT (OUTPATIENT)
Dept: MEDICAL GROUP | Facility: PHYSICIAN GROUP | Age: 39
End: 2024-10-01
Payer: COMMERCIAL

## 2024-10-01 VITALS
HEART RATE: 76 BPM | TEMPERATURE: 98 F | WEIGHT: 224 LBS | SYSTOLIC BLOOD PRESSURE: 134 MMHG | DIASTOLIC BLOOD PRESSURE: 82 MMHG | OXYGEN SATURATION: 97 % | BODY MASS INDEX: 31.36 KG/M2 | HEIGHT: 71 IN

## 2024-10-01 DIAGNOSIS — E66.3 OVERWEIGHT: ICD-10-CM

## 2024-10-01 DIAGNOSIS — F41.9 ANXIETY: ICD-10-CM

## 2024-10-01 DIAGNOSIS — F10.21 ALCOHOL DEPENDENCE IN EARLY FULL REMISSION (HCC): ICD-10-CM

## 2024-10-01 DIAGNOSIS — K21.9 GASTROESOPHAGEAL REFLUX DISEASE, UNSPECIFIED WHETHER ESOPHAGITIS PRESENT: ICD-10-CM

## 2024-10-01 DIAGNOSIS — K43.9 VENTRAL HERNIA WITHOUT OBSTRUCTION OR GANGRENE: ICD-10-CM

## 2024-10-01 PROCEDURE — 3079F DIAST BP 80-89 MM HG: CPT | Performed by: INTERNAL MEDICINE

## 2024-10-01 PROCEDURE — 99214 OFFICE O/P EST MOD 30 MIN: CPT | Performed by: INTERNAL MEDICINE

## 2024-10-01 PROCEDURE — 3075F SYST BP GE 130 - 139MM HG: CPT | Performed by: INTERNAL MEDICINE

## 2024-10-01 RX ORDER — PANTOPRAZOLE SODIUM 40 MG/1
40 TABLET, DELAYED RELEASE ORAL
Qty: 90 TABLET | Refills: 3 | Status: SHIPPED | OUTPATIENT
Start: 2024-10-01

## 2024-10-01 RX ORDER — HYDROXYZINE HYDROCHLORIDE 10 MG/1
10 TABLET, FILM COATED ORAL 2 TIMES DAILY PRN
Qty: 60 TABLET | Refills: 2 | Status: SHIPPED | OUTPATIENT
Start: 2024-10-01

## 2024-10-01 ASSESSMENT — PATIENT HEALTH QUESTIONNAIRE - PHQ9: CLINICAL INTERPRETATION OF PHQ2 SCORE: 0

## 2024-10-01 ASSESSMENT — FIBROSIS 4 INDEX: FIB4 SCORE: 9.49

## 2024-10-23 ENCOUNTER — APPOINTMENT (OUTPATIENT)
Dept: MEDICAL GROUP | Facility: PHYSICIAN GROUP | Age: 39
End: 2024-10-23
Payer: COMMERCIAL

## 2024-10-23 ENCOUNTER — OFFICE VISIT (OUTPATIENT)
Dept: MEDICAL GROUP | Facility: PHYSICIAN GROUP | Age: 39
End: 2024-10-23
Payer: COMMERCIAL

## 2024-10-23 VITALS
RESPIRATION RATE: 14 BRPM | WEIGHT: 222 LBS | HEART RATE: 70 BPM | TEMPERATURE: 97.2 F | OXYGEN SATURATION: 97 % | SYSTOLIC BLOOD PRESSURE: 134 MMHG | DIASTOLIC BLOOD PRESSURE: 90 MMHG | HEIGHT: 71 IN | BODY MASS INDEX: 31.08 KG/M2

## 2024-10-23 DIAGNOSIS — D69.6 THROMBOCYTOPENIA (HCC): ICD-10-CM

## 2024-10-23 DIAGNOSIS — J02.9 SORE THROAT: ICD-10-CM

## 2024-10-23 DIAGNOSIS — R79.89 ELEVATED LFTS: ICD-10-CM

## 2024-10-23 DIAGNOSIS — K21.9 GASTROESOPHAGEAL REFLUX DISEASE, UNSPECIFIED WHETHER ESOPHAGITIS PRESENT: ICD-10-CM

## 2024-10-23 DIAGNOSIS — Z11.3 SCREENING EXAMINATION FOR SEXUALLY TRANSMITTED DISEASE: ICD-10-CM

## 2024-10-23 DIAGNOSIS — E66.3 OVERWEIGHT: ICD-10-CM

## 2024-10-23 DIAGNOSIS — F41.9 ANXIETY: ICD-10-CM

## 2024-10-23 DIAGNOSIS — R68.82 LOW LIBIDO: ICD-10-CM

## 2024-10-23 DIAGNOSIS — F10.21 ALCOHOL DEPENDENCE IN EARLY FULL REMISSION (HCC): ICD-10-CM

## 2024-10-23 DIAGNOSIS — Z23 NEED FOR VACCINATION: ICD-10-CM

## 2024-10-23 LAB
FLUAV RNA SPEC QL NAA+PROBE: NEGATIVE
FLUBV RNA SPEC QL NAA+PROBE: NEGATIVE
RSV RNA SPEC QL NAA+PROBE: NEGATIVE
S PYO DNA SPEC NAA+PROBE: NOT DETECTED
SARS-COV-2 RNA RESP QL NAA+PROBE: NEGATIVE

## 2024-10-23 PROCEDURE — 3075F SYST BP GE 130 - 139MM HG: CPT | Performed by: PHYSICIAN ASSISTANT

## 2024-10-23 PROCEDURE — 99214 OFFICE O/P EST MOD 30 MIN: CPT | Performed by: PHYSICIAN ASSISTANT

## 2024-10-23 PROCEDURE — 87651 STREP A DNA AMP PROBE: CPT | Performed by: PHYSICIAN ASSISTANT

## 2024-10-23 PROCEDURE — 96127 BRIEF EMOTIONAL/BEHAV ASSMT: CPT | Performed by: PHYSICIAN ASSISTANT

## 2024-10-23 PROCEDURE — 0241U POCT CEPHEID COV-2, FLU A/B, RSV - PCR: CPT | Performed by: PHYSICIAN ASSISTANT

## 2024-10-23 PROCEDURE — 3080F DIAST BP >= 90 MM HG: CPT | Performed by: PHYSICIAN ASSISTANT

## 2024-10-23 RX ORDER — ESCITALOPRAM OXALATE 10 MG/1
TABLET ORAL
Qty: 30 TABLET | Refills: 1 | Status: SHIPPED | OUTPATIENT
Start: 2024-10-23 | End: 2024-11-22

## 2024-10-23 ASSESSMENT — ANXIETY QUESTIONNAIRES
6. BECOMING EASILY ANNOYED OR IRRITABLE: MORE THAN HALF THE DAYS
4. TROUBLE RELAXING: SEVERAL DAYS
3. WORRYING TOO MUCH ABOUT DIFFERENT THINGS: MORE THAN HALF THE DAYS
2. NOT BEING ABLE TO STOP OR CONTROL WORRYING: SEVERAL DAYS
1. FEELING NERVOUS, ANXIOUS, OR ON EDGE: SEVERAL DAYS
7. FEELING AFRAID AS IF SOMETHING AWFUL MIGHT HAPPEN: SEVERAL DAYS
GAD7 TOTAL SCORE: 9
5. BEING SO RESTLESS THAT IT IS HARD TO SIT STILL: SEVERAL DAYS

## 2024-10-23 ASSESSMENT — PATIENT HEALTH QUESTIONNAIRE - PHQ9
SUM OF ALL RESPONSES TO PHQ QUESTIONS 1-9: 3
CLINICAL INTERPRETATION OF PHQ2 SCORE: 1
5. POOR APPETITE OR OVEREATING: 0 - NOT AT ALL

## 2024-10-23 ASSESSMENT — ENCOUNTER SYMPTOMS
FEVER: 0
CHILLS: 0
SHORTNESS OF BREATH: 0

## 2024-10-23 ASSESSMENT — FIBROSIS 4 INDEX: FIB4 SCORE: 9.49

## 2024-11-07 ENCOUNTER — OFFICE VISIT (OUTPATIENT)
Dept: URGENT CARE | Facility: CLINIC | Age: 39
End: 2024-11-07
Payer: COMMERCIAL

## 2024-11-07 ENCOUNTER — PHARMACY VISIT (OUTPATIENT)
Dept: PHARMACY | Facility: MEDICAL CENTER | Age: 39
End: 2024-11-07
Payer: MEDICARE

## 2024-11-07 VITALS
HEART RATE: 97 BPM | OXYGEN SATURATION: 97 % | BODY MASS INDEX: 30.34 KG/M2 | RESPIRATION RATE: 16 BRPM | TEMPERATURE: 97.5 F | WEIGHT: 224 LBS | HEIGHT: 72 IN

## 2024-11-07 DIAGNOSIS — R03.0 ELEVATED BLOOD PRESSURE READING: ICD-10-CM

## 2024-11-07 DIAGNOSIS — J03.90 TONSILLITIS WITH EXUDATE: ICD-10-CM

## 2024-11-07 PROCEDURE — RXMED WILLOW AMBULATORY MEDICATION CHARGE: Performed by: PHYSICIAN ASSISTANT

## 2024-11-07 PROCEDURE — 99214 OFFICE O/P EST MOD 30 MIN: CPT | Performed by: PHYSICIAN ASSISTANT

## 2024-11-07 RX ORDER — DEXTROMETHORPHAN HYDROBROMIDE AND PROMETHAZINE HYDROCHLORIDE 15; 6.25 MG/5ML; MG/5ML
5 SYRUP ORAL 3 TIMES DAILY PRN
Qty: 120 ML | Refills: 0 | Status: SHIPPED | OUTPATIENT
Start: 2024-11-07

## 2024-11-07 RX ORDER — AMOXICILLIN 500 MG/1
500 CAPSULE ORAL 2 TIMES DAILY
Qty: 20 CAPSULE | Refills: 0 | Status: SHIPPED | OUTPATIENT
Start: 2024-11-07 | End: 2024-11-17

## 2024-11-07 ASSESSMENT — ENCOUNTER SYMPTOMS
WHEEZING: 0
VOMITING: 0
GASTROINTESTINAL NEGATIVE: 1
TROUBLE SWALLOWING: 1
CARDIOVASCULAR NEGATIVE: 1
DIZZINESS: 0
COUGH: 0
ABDOMINAL PAIN: 0
SHORTNESS OF BREATH: 0
SORE THROAT: 1
DIARRHEA: 0
HOARSE VOICE: 0
SWOLLEN GLANDS: 1
MUSCULOSKELETAL NEGATIVE: 1
HEADACHES: 1
FEVER: 0
CHILLS: 0

## 2024-11-07 ASSESSMENT — FIBROSIS 4 INDEX: FIB4 SCORE: 9.74

## 2024-11-08 NOTE — PROGRESS NOTES
Subjective     Maxime Fernandez is a very pleasant 39 y.o. male who presents with Pharyngitis (Cough, ears feel clogged x5 days)            Pharyngitis   This is a new problem. The current episode started in the past 7 days. The problem has been gradually worsening. The pain is worse on the right side. There has been no fever. The fever has been present for Less than 1 day. Associated symptoms include congestion, ear pain, headaches, swollen glands and trouble swallowing. Pertinent negatives include no abdominal pain, coughing, diarrhea, hoarse voice, shortness of breath or vomiting. He has tried acetaminophen for the symptoms. The treatment provided mild relief.       PMH:  has a past medical history of Anxiety, Depression, GERD (gastroesophageal reflux disease), and Psychiatric disorder.  MEDS:   Current Outpatient Medications:     amoxicillin (AMOXIL) 500 MG Cap, Take 1 Capsule by mouth 2 times a day for 10 days., Disp: 20 Capsule, Rfl: 0    promethazine-dextromethorphan (PROMETHAZINE-DM) 6.25-15 MG/5ML syrup, Take 5 mL by mouth 3 times a day as needed for Cough., Disp: 120 mL, Rfl: 0    escitalopram (LEXAPRO) 10 MG Tab, Take 0.5 Tablets by mouth every day for 14 days, THEN 1 Tablet every day for 16 days., Disp: 30 Tablet, Rfl: 1    pantoprazole (PROTONIX) 40 MG Tablet Delayed Response, Take 1 Tablet by mouth every day., Disp: 90 Tablet, Rfl: 3    multivitamin Tab, Take 1 Tablet by mouth every day., Disp: 30 Tablet, Rfl: 0  ALLERGIES:   Allergies   Allergen Reactions    Inderal [Propranolol]      SURGHX: No past surgical history on file.  SOCHX:  reports that he has never smoked. He has never used smokeless tobacco. He reports that he does not currently use alcohol. He reports that he does not use drugs.  FH: family history includes Hyperlipidemia in his father; No Known Problems in his brother and sister; Thyroid in his mother.      Review of Systems   Constitutional:  Negative for chills and fever.   HENT:   Positive for congestion, ear pain, sore throat and trouble swallowing. Negative for hoarse voice.    Respiratory:  Negative for cough, shortness of breath and wheezing.    Cardiovascular: Negative.    Gastrointestinal: Negative.  Negative for abdominal pain, diarrhea and vomiting.   Musculoskeletal: Negative.    Neurological:  Positive for headaches. Negative for dizziness.       Medications, Allergies, and current problem list reviewed today in Epic           Objective     BP: 146/85  Pulse 97   Temp 36.4 °C (97.5 °F) (Temporal)   Resp 16   Ht 1.829 m (6')   Wt 102 kg (224 lb)   SpO2 97%   BMI 30.38 kg/m²      Physical Exam  Vitals and nursing note reviewed.   Constitutional:       General: He is not in acute distress.     Appearance: Normal appearance. He is well-developed. He is not diaphoretic.   HENT:      Head: Normocephalic and atraumatic.      Right Ear: Hearing, tympanic membrane, ear canal and external ear normal. Tympanic membrane is not erythematous.      Left Ear: Hearing, tympanic membrane, ear canal and external ear normal. Tympanic membrane is not erythematous.      Nose: Nose normal. No congestion or rhinorrhea.      Mouth/Throat:      Mouth: Mucous membranes are moist.      Dentition: Normal dentition. No dental caries.      Pharynx: Uvula midline. Pharyngeal swelling, oropharyngeal exudate and posterior oropharyngeal erythema present. No uvula swelling.      Tonsils: Tonsillar exudate present. No tonsillar abscesses.      Comments: Clear speech, managing oral secretions  Eyes:      General: No scleral icterus.        Right eye: No discharge.         Left eye: No discharge.      Conjunctiva/sclera: Conjunctivae normal.   Neck:      Thyroid: No thyromegaly.      Trachea: No tracheal deviation.   Cardiovascular:      Rate and Rhythm: Normal rate and regular rhythm.      Pulses: Normal pulses.      Heart sounds: Normal heart sounds. No murmur heard.  Pulmonary:      Effort: Pulmonary effort is  normal. No respiratory distress.      Breath sounds: Normal breath sounds. No wheezing, rhonchi or rales.   Musculoskeletal:      Cervical back: Normal range of motion and neck supple. No rigidity.   Lymphadenopathy:      Head:      Right side of head: Submandibular adenopathy present.      Left side of head: Submandibular adenopathy present.      Cervical: Cervical adenopathy present.      Right cervical: No superficial cervical adenopathy.     Left cervical: No superficial cervical adenopathy.   Skin:     General: Skin is warm and dry.      Nails: There is no clubbing.   Neurological:      General: No focal deficit present.      Mental Status: He is alert and oriented to person, place, and time. Mental status is at baseline.   Psychiatric:         Mood and Affect: Mood normal.         Behavior: Behavior normal.         Thought Content: Thought content normal.         Judgment: Judgment normal.                             Assessment & Plan      This is a very pleasant 39-year-old male presenting with tonsillitis symptoms for 3 weeks.  Did take a OTC [place] antibiotic initially for 2 days which significantly improved symptoms but they did return.  Some chills and bodyaches but no fever.  No significant cough or congestion.  No shortness of breath or wheezing.  Vital signs stable.  Right-sided tonsillar enlargement with exudate and adenopathy.  Uvula is midline with no signs of abscess.  Lungs are clear showing no lower respiratory involvement.  He will be treated for bacterial tonsillitis based on duration of symptoms.  Assessment & Plan  Tonsillitis with exudate    Orders:    amoxicillin (AMOXIL) 500 MG Cap; Take 1 Capsule by mouth 2 times a day for 10 days.    promethazine-dextromethorphan (PROMETHAZINE-DM) 6.25-15 MG/5ML syrup; Take 5 mL by mouth 3 times a day as needed for Cough.    Elevated blood pressure reading  Mildly elevated BP in clinic.  No red flag symptoms.  Follow-up with PCP.               I  personally reviewed prior external notes and test results pertinent to today's visit. Return to clinic or go to ED if symptoms worsen or persist. Red flag symptoms and indications for ED discussed at length. Patient/Parent/Guardian voices understanding.  AVS with post-visit instructions printed and provided or given verbally.  Follow-up with your primary care provider in 3-5 days. All side effects and potential interactions of prescribed medication discussed including allergic response, GI upset, tendon injury, rash, sedation, OCP effectiveness, etc.    Please note that this dictation was created using voice recognition software. I have made every reasonable attempt to correct obvious errors, but I expect that there are errors of grammar and possibly content that I did not discover before finalizing the note.

## 2025-02-20 ENCOUNTER — HOSPITAL ENCOUNTER (EMERGENCY)
Facility: MEDICAL CENTER | Age: 40
End: 2025-02-20
Payer: COMMERCIAL

## 2025-02-20 ENCOUNTER — APPOINTMENT (OUTPATIENT)
Dept: RADIOLOGY | Facility: MEDICAL CENTER | Age: 40
End: 2025-02-20
Attending: EMERGENCY MEDICINE
Payer: COMMERCIAL

## 2025-02-20 ENCOUNTER — HOSPITAL ENCOUNTER (INPATIENT)
Facility: MEDICAL CENTER | Age: 40
LOS: 2 days | End: 2025-02-23
Attending: EMERGENCY MEDICINE | Admitting: HOSPITALIST
Payer: COMMERCIAL

## 2025-02-20 ENCOUNTER — OFFICE VISIT (OUTPATIENT)
Dept: URGENT CARE | Facility: CLINIC | Age: 40
End: 2025-02-20
Payer: COMMERCIAL

## 2025-02-20 VITALS
OXYGEN SATURATION: 98 % | TEMPERATURE: 98.8 F | HEIGHT: 71 IN | SYSTOLIC BLOOD PRESSURE: 160 MMHG | HEART RATE: 147 BPM | WEIGHT: 207.9 LBS | BODY MASS INDEX: 29.1 KG/M2 | DIASTOLIC BLOOD PRESSURE: 94 MMHG | RESPIRATION RATE: 20 BRPM

## 2025-02-20 DIAGNOSIS — S42.401A CLOSED FRACTURE OF RIGHT ELBOW, INITIAL ENCOUNTER: ICD-10-CM

## 2025-02-20 DIAGNOSIS — R00.0 TACHYCARDIA: ICD-10-CM

## 2025-02-20 DIAGNOSIS — E88.89 ALCOHOLIC KETOSIS (HCC): ICD-10-CM

## 2025-02-20 DIAGNOSIS — F10.139 ALCOHOL ABUSE WITH WITHDRAWAL, UNSPECIFIED (HCC): ICD-10-CM

## 2025-02-20 DIAGNOSIS — E55.9 VITAMIN D DEFICIENCY: ICD-10-CM

## 2025-02-20 DIAGNOSIS — S59.901A INJURY OF RIGHT ELBOW, INITIAL ENCOUNTER: ICD-10-CM

## 2025-02-20 DIAGNOSIS — F10.920 ALCOHOLIC INTOXICATION WITHOUT COMPLICATION (HCC): ICD-10-CM

## 2025-02-20 DIAGNOSIS — R03.0 ELEVATED BLOOD PRESSURE READING: ICD-10-CM

## 2025-02-20 DIAGNOSIS — F10.929 ALCOHOLIC INTOXICATION WITH COMPLICATION (HCC): ICD-10-CM
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
## 2025-02-20 DIAGNOSIS — E83.51 HYPOCALCEMIA: ICD-10-CM

## 2025-02-20 DIAGNOSIS — F10.21 ALCOHOL DEPENDENCE IN EARLY FULL REMISSION (HCC): ICD-10-CM

## 2025-02-20 LAB
ALBUMIN SERPL BCP-MCNC: 4.3 G/DL (ref 3.2–4.9)
ALBUMIN/GLOB SERPL: 1.4 G/DL
ALP SERPL-CCNC: 82 U/L (ref 30–99)
ALT SERPL-CCNC: 32 U/L (ref 2–50)
ANION GAP SERPL CALC-SCNC: 28 MMOL/L (ref 7–16)
APTT PPP: 23 SEC (ref 24.7–36)
AST SERPL-CCNC: 60 U/L (ref 12–45)
BASOPHILS # BLD AUTO: 0.2 % (ref 0–1.8)
BASOPHILS # BLD: 0.02 K/UL (ref 0–0.12)
BILIRUB SERPL-MCNC: 0.8 MG/DL (ref 0.1–1.5)
BUN SERPL-MCNC: 18 MG/DL (ref 8–22)
CALCIUM ALBUM COR SERPL-MCNC: 7.7 MG/DL (ref 8.5–10.5)
CALCIUM SERPL-MCNC: 7.9 MG/DL (ref 8.4–10.2)
CHLORIDE SERPL-SCNC: 92 MMOL/L (ref 96–112)
CO2 SERPL-SCNC: 16 MMOL/L (ref 20–33)
CREAT SERPL-MCNC: 1.07 MG/DL (ref 0.5–1.4)
EOSINOPHIL # BLD AUTO: 0 K/UL (ref 0–0.51)
EOSINOPHIL NFR BLD: 0 % (ref 0–6.9)
ERYTHROCYTE [DISTWIDTH] IN BLOOD BY AUTOMATED COUNT: 37.5 FL (ref 35.9–50)
ETHANOL BLD-MCNC: 282 MG/DL
GFR SERPLBLD CREATININE-BSD FMLA CKD-EPI: 90 ML/MIN/1.73 M 2
GLOBULIN SER CALC-MCNC: 3 G/DL (ref 1.9–3.5)
GLUCOSE SERPL-MCNC: 155 MG/DL (ref 65–99)
HCT VFR BLD AUTO: 43.9 % (ref 42–52)
HGB BLD-MCNC: 16.2 G/DL (ref 14–18)
IMM GRANULOCYTES # BLD AUTO: 0.05 K/UL (ref 0–0.11)
IMM GRANULOCYTES NFR BLD AUTO: 0.5 % (ref 0–0.9)
INR PPP: 0.94 (ref 0.87–1.13)
LYMPHOCYTES # BLD AUTO: 1.1 K/UL (ref 1–4.8)
LYMPHOCYTES NFR BLD: 10 % (ref 22–41)
MCH RBC QN AUTO: 31.2 PG (ref 27–33)
MCHC RBC AUTO-ENTMCNC: 36.9 G/DL (ref 32.3–36.5)
MCV RBC AUTO: 84.6 FL (ref 81.4–97.8)
MONOCYTES # BLD AUTO: 0.6 K/UL (ref 0–0.85)
MONOCYTES NFR BLD AUTO: 5.4 % (ref 0–13.4)
NEUTROPHILS # BLD AUTO: 9.28 K/UL (ref 1.82–7.42)
NEUTROPHILS NFR BLD: 83.9 % (ref 44–72)
NRBC # BLD AUTO: 0 K/UL
NRBC BLD-RTO: 0 /100 WBC (ref 0–0.2)
PLATELET # BLD AUTO: 219 K/UL (ref 164–446)
PMV BLD AUTO: 10.3 FL (ref 9–12.9)
POTASSIUM SERPL-SCNC: 3.4 MMOL/L (ref 3.6–5.5)
PROT SERPL-MCNC: 7.3 G/DL (ref 6–8.2)
PROTHROMBIN TIME: 12.8 SEC (ref 12–14.6)
RBC # BLD AUTO: 5.19 M/UL (ref 4.7–6.1)
SODIUM SERPL-SCNC: 136 MMOL/L (ref 135–145)
WBC # BLD AUTO: 11.1 K/UL (ref 4.8–10.8)

## 2025-02-20 PROCEDURE — 700111 HCHG RX REV CODE 636 W/ 250 OVERRIDE (IP): Mod: JZ | Performed by: EMERGENCY MEDICINE

## 2025-02-20 PROCEDURE — A9270 NON-COVERED ITEM OR SERVICE: HCPCS | Performed by: EMERGENCY MEDICINE

## 2025-02-20 PROCEDURE — 36415 COLL VENOUS BLD VENIPUNCTURE: CPT

## 2025-02-20 PROCEDURE — 3080F DIAST BP >= 90 MM HG: CPT | Performed by: NURSE PRACTITIONER

## 2025-02-20 PROCEDURE — 96365 THER/PROPH/DIAG IV INF INIT: CPT

## 2025-02-20 PROCEDURE — 73090 X-RAY EXAM OF FOREARM: CPT | Mod: RT

## 2025-02-20 PROCEDURE — 82077 ASSAY SPEC XCP UR&BREATH IA: CPT

## 2025-02-20 PROCEDURE — 85610 PROTHROMBIN TIME: CPT

## 2025-02-20 PROCEDURE — 73080 X-RAY EXAM OF ELBOW: CPT | Mod: RT

## 2025-02-20 PROCEDURE — 85025 COMPLETE CBC W/AUTO DIFF WBC: CPT

## 2025-02-20 PROCEDURE — 85730 THROMBOPLASTIN TIME PARTIAL: CPT

## 2025-02-20 PROCEDURE — 3077F SYST BP >= 140 MM HG: CPT | Performed by: NURSE PRACTITIONER

## 2025-02-20 PROCEDURE — 99215 OFFICE O/P EST HI 40 MIN: CPT | Performed by: NURSE PRACTITIONER

## 2025-02-20 PROCEDURE — 80053 COMPREHEN METABOLIC PANEL: CPT

## 2025-02-20 PROCEDURE — 700105 HCHG RX REV CODE 258: Performed by: EMERGENCY MEDICINE

## 2025-02-20 PROCEDURE — 700102 HCHG RX REV CODE 250 W/ 637 OVERRIDE(OP): Performed by: EMERGENCY MEDICINE

## 2025-02-20 PROCEDURE — 99285 EMERGENCY DEPT VISIT HI MDM: CPT

## 2025-02-20 RX ORDER — DIAZEPAM 5 MG/1
5 TABLET ORAL
Status: DISCONTINUED | OUTPATIENT
Start: 2025-02-20 | End: 2025-02-21

## 2025-02-20 RX ORDER — CALCIUM GLUCONATE 20 MG/ML
2 INJECTION, SOLUTION INTRAVENOUS ONCE
Status: COMPLETED | OUTPATIENT
Start: 2025-02-20 | End: 2025-02-21

## 2025-02-20 RX ORDER — SODIUM CHLORIDE, SODIUM LACTATE, POTASSIUM CHLORIDE, CALCIUM CHLORIDE 600; 310; 30; 20 MG/100ML; MG/100ML; MG/100ML; MG/100ML
1000 INJECTION, SOLUTION INTRAVENOUS ONCE
Status: COMPLETED | OUTPATIENT
Start: 2025-02-20 | End: 2025-02-20

## 2025-02-20 RX ADMIN — SODIUM CHLORIDE, POTASSIUM CHLORIDE, SODIUM LACTATE AND CALCIUM CHLORIDE 1000 ML: 600; 310; 30; 20 INJECTION, SOLUTION INTRAVENOUS at 23:33

## 2025-02-20 RX ADMIN — CALCIUM GLUCONATE 2 G: 20 INJECTION, SOLUTION INTRAVENOUS at 23:27

## 2025-02-20 RX ADMIN — DIAZEPAM 5 MG: 5 TABLET ORAL at 23:27

## 2025-02-20 ASSESSMENT — LIFESTYLE VARIABLES
VISUAL DISTURBANCES: NOT PRESENT
ANXIETY: MILDLY ANXIOUS
HEADACHE, FULLNESS IN HEAD: NOT PRESENT
AUDITORY DISTURBANCES: NOT PRESENT
ORIENTATION AND CLOUDING OF SENSORIUM: ORIENTED AND CAN DO SERIAL ADDITIONS
PAROXYSMAL SWEATS: NO SWEAT VISIBLE
AGITATION: NORMAL ACTIVITY
TOTAL SCORE: 3
NAUSEA AND VOMITING: MILD NAUSEA WITH NO VOMITING
TREMOR: TREMOR NOT VISIBLE BUT CAN BE FELT, FINGERTIP TO FINGERTIP

## 2025-02-20 ASSESSMENT — FIBROSIS 4 INDEX
FIB4 SCORE: 9.74
FIB4 SCORE: 9.74

## 2025-02-20 NOTE — PROGRESS NOTES
Chief Complaint   Patient presents with    Fall     X2days right arm/elbow injury/          History of Present Illness  The patient is a 39-year-old male who presents for evaluation of right arm pain.    He sustained an injury to his right arm approximately 1 to 2 days ago, following a fall onto concrete while attempting to retrieve his dog. He reports severe pain in the affected arm but does not experience any numbness or tingling sensations. He has been consuming alcohol as a means of pain management, with an intake of half a pint today. He expresses a desire to cease alcohol consumption, having previously abstained for a period of 4 years prior to this incident.    SOCIAL HISTORY  The patient admits to drinking alcohol and has consumed half a pint today. He expresses a desire to stop drinking.         ROS:    No severe shortness of breath   No Cardiac like chest pain, as discussed   As otherwise stated in HPI    Medical/SX/ Social History:  Reviewed per chart    Pertinent Medications:    Current Outpatient Medications on File Prior to Visit   Medication Sig Dispense Refill    promethazine-dextromethorphan (PROMETHAZINE-DM) 6.25-15 MG/5ML syrup Take 5 mL by mouth 3 times a day as needed for Cough. (Patient not taking: Reported on 2/20/2025) 120 mL 0    pantoprazole (PROTONIX) 40 MG Tablet Delayed Response Take 1 Tablet by mouth every day. (Patient not taking: Reported on 2/20/2025) 90 Tablet 3    multivitamin Tab Take 1 Tablet by mouth every day. (Patient not taking: Reported on 2/20/2025) 30 Tablet 0     No current facility-administered medications on file prior to visit.        Allergies:    Inderal [propranolol]     Problem list, medications, and allergies reviewed by myself today in Epic     Physical Exam:    Vitals:    02/20/25 1107   BP: (!) 160/94   Pulse: (!) 147   Resp: 20   Temp: 37.1 °C (98.8 °F)   SpO2: 98%             Physical Exam  Constitutional:       Appearance: He is not ill-appearing,  toxic-appearing or diaphoretic.   HENT:      Head: Normocephalic and atraumatic.      Right Ear: No hemotympanum.      Left Ear: No hemotympanum.   Eyes:      General: Lids are normal. Gaze aligned appropriately.      Conjunctiva/sclera:      Right eye: Right conjunctiva is not injected.      Left eye: Left conjunctiva is not injected.      Pupils: Pupils are equal, round, and reactive to light.   Cardiovascular:      Rate and Rhythm: Regular rhythm. Tachycardia present.      Pulses:           Radial pulses are 2+ on the right side and 2+ on the left side.      Heart sounds: Normal heart sounds.   Pulmonary:      Effort: Pulmonary effort is normal.      Breath sounds: Normal breath sounds and air entry.   Musculoskeletal:      Comments: Significant swelling distal to the right elbow large hematoma present very tender to palpation.  Patient is unable to flex or extend his elbow he is unable to pronate at the wrist.  Cap refill less than 3 seconds.   Neurological:      Mental Status: He is alert and oriented to person, place, and time.      Motor: Tremor present.      Coordination: Coordination normal.      Comments: Patient is alert and oriented to place time situation.  Although does appear to be acutely intoxicated.                   Medical Decision making and plan :  I personally reviewed prior external notes and test results pertinent to today's visit. Pleasant 39 y.o. male presented clinic with:     Assessment & Plan  1. Injury of right elbow, initial encounter      2. Tachycardia      3. Alcoholic intoxication without complication (HCC)      4. Alcohol abuse with withdrawal, unspecified (HCC)     The clinical presentation suggests a potential fracture or dislocation of the right arm/ elbow, necessitating further diagnostic imaging for confirmation.  Unfortunately unable to obtain x-ray in clinic today. Given the severity of the swelling, an x-ray is warranted. The appearance of the elbow suggests that surgical  intervention maybe required. In the absence of hand numbness or tingling, immediate surgery may not be necessary, but an orthopedic consultation would be beneficial     His elevated heart rate and blood pressure could be indicative of alcohol withdrawal symptoms.  Did recommend obtaining an EKG patient did decline.  Patient does have a bottle of brown liquor in the room with him.  Admits to drinking half a pint today.  He did not drive to clinic.  Did recommend seeking high-level care due to tachycardia elevated heart rate and they can see him for his elbow injury as well.  Patient is agreeable.  Did recommend EMS transportation due to patient's presentation and tachycardia patient did decline.  Again patient is alert and oriented.  Did call mother-in-law she states his son would come pick him up.  Asked patient to wait in clinic.  Unfortunately when I went to check on the patient he did leave prior to his ride presenting.  He has been advised to seek help for his alcohol consumption and to consider detoxification and enrollment in a rehabilitation program. He has been provided with an ice pack for his arm.  Recommended higher level care as well as transfer center was called.  Unfortunately patient did leave prior to ride coming to pick him up he was seen walking down the street we did call out for him he did not respond and he was walking in the opposite direction of the emergency department.        Disposition:  Left     Voice Recognition Disclaimer:  Portions of this document were created using voice recognition software and [employer] technology provided by [facility].  Patient was informed of luisme technology being used.  The software does have a chance of producing errors of grammar and possibly content. I have made every reasonable attempt to correct obvious errors, but there could be errors of grammar and possibly content that I did not discover before finalizing the documentation.    Enriqueta Xiao, [email or web address].

## 2025-02-21 PROBLEM — R00.0 TACHYCARDIA: Status: ACTIVE | Noted: 2025-02-21

## 2025-02-21 PROBLEM — S52.021A OLECRANON FRACTURE, RIGHT, CLOSED, INITIAL ENCOUNTER: Status: ACTIVE | Noted: 2025-02-21

## 2025-02-21 PROBLEM — E87.6 HYPOKALEMIA: Status: ACTIVE | Noted: 2025-02-21

## 2025-02-21 PROBLEM — E88.89 ALCOHOLIC KETOSIS (HCC): Status: ACTIVE | Noted: 2025-02-21

## 2025-02-21 LAB
ANION GAP SERPL CALC-SCNC: 20 MMOL/L (ref 7–16)
BUN SERPL-MCNC: 15 MG/DL (ref 8–22)
CALCIUM SERPL-MCNC: 8.1 MG/DL (ref 8.4–10.2)
CHLORIDE SERPL-SCNC: 93 MMOL/L (ref 96–112)
CO2 SERPL-SCNC: 20 MMOL/L (ref 20–33)
CREAT SERPL-MCNC: 0.92 MG/DL (ref 0.5–1.4)
D DIMER PPP IA.FEU-MCNC: 1.12 UG/ML (FEU) (ref 0–0.5)
EKG IMPRESSION: NORMAL
GFR SERPLBLD CREATININE-BSD FMLA CKD-EPI: 108 ML/MIN/1.73 M 2
GLUCOSE SERPL-MCNC: 92 MG/DL (ref 65–99)
MAGNESIUM SERPL-MCNC: 1.5 MG/DL (ref 1.5–2.5)
POTASSIUM SERPL-SCNC: 3 MMOL/L (ref 3.6–5.5)
SODIUM SERPL-SCNC: 133 MMOL/L (ref 135–145)
TROPONIN T SERPL-MCNC: 9 NG/L (ref 6–19)

## 2025-02-21 PROCEDURE — A9270 NON-COVERED ITEM OR SERVICE: HCPCS | Performed by: HOSPITALIST

## 2025-02-21 PROCEDURE — 94760 N-INVAS EAR/PLS OXIMETRY 1: CPT

## 2025-02-21 PROCEDURE — 36415 COLL VENOUS BLD VENIPUNCTURE: CPT

## 2025-02-21 PROCEDURE — A9270 NON-COVERED ITEM OR SERVICE: HCPCS | Performed by: EMERGENCY MEDICINE

## 2025-02-21 PROCEDURE — 99223 1ST HOSP IP/OBS HIGH 75: CPT | Performed by: HOSPITALIST

## 2025-02-21 PROCEDURE — 83735 ASSAY OF MAGNESIUM: CPT

## 2025-02-21 PROCEDURE — 700111 HCHG RX REV CODE 636 W/ 250 OVERRIDE (IP): Mod: JZ | Performed by: HOSPITALIST

## 2025-02-21 PROCEDURE — 96374 THER/PROPH/DIAG INJ IV PUSH: CPT

## 2025-02-21 PROCEDURE — 93010 ELECTROCARDIOGRAM REPORT: CPT | Performed by: INTERNAL MEDICINE

## 2025-02-21 PROCEDURE — 93005 ELECTROCARDIOGRAM TRACING: CPT | Mod: TC | Performed by: INTERNAL MEDICINE

## 2025-02-21 PROCEDURE — 80048 BASIC METABOLIC PNL TOTAL CA: CPT

## 2025-02-21 PROCEDURE — 700102 HCHG RX REV CODE 250 W/ 637 OVERRIDE(OP): Performed by: INTERNAL MEDICINE

## 2025-02-21 PROCEDURE — 700102 HCHG RX REV CODE 250 W/ 637 OVERRIDE(OP): Performed by: EMERGENCY MEDICINE

## 2025-02-21 PROCEDURE — A9270 NON-COVERED ITEM OR SERVICE: HCPCS | Performed by: INTERNAL MEDICINE

## 2025-02-21 PROCEDURE — 96375 TX/PRO/DX INJ NEW DRUG ADDON: CPT

## 2025-02-21 PROCEDURE — 96367 TX/PROPH/DG ADDL SEQ IV INF: CPT

## 2025-02-21 PROCEDURE — 700111 HCHG RX REV CODE 636 W/ 250 OVERRIDE (IP): Performed by: INTERNAL MEDICINE

## 2025-02-21 PROCEDURE — 770020 HCHG ROOM/CARE - TELE (206)

## 2025-02-21 PROCEDURE — 700105 HCHG RX REV CODE 258: Performed by: INTERNAL MEDICINE

## 2025-02-21 PROCEDURE — 700102 HCHG RX REV CODE 250 W/ 637 OVERRIDE(OP): Performed by: HOSPITALIST

## 2025-02-21 PROCEDURE — 73200 CT UPPER EXTREMITY W/O DYE: CPT | Mod: RT

## 2025-02-21 PROCEDURE — 700101 HCHG RX REV CODE 250: Performed by: EMERGENCY MEDICINE

## 2025-02-21 PROCEDURE — 85379 FIBRIN DEGRADATION QUANT: CPT

## 2025-02-21 PROCEDURE — 700111 HCHG RX REV CODE 636 W/ 250 OVERRIDE (IP): Mod: JZ | Performed by: EMERGENCY MEDICINE

## 2025-02-21 PROCEDURE — 84484 ASSAY OF TROPONIN QUANT: CPT

## 2025-02-21 RX ORDER — ONDANSETRON 4 MG/1
4 TABLET, ORALLY DISINTEGRATING ORAL EVERY 4 HOURS PRN
Status: DISCONTINUED | OUTPATIENT
Start: 2025-02-21 | End: 2025-02-23 | Stop reason: HOSPADM

## 2025-02-21 RX ORDER — LORAZEPAM 1 MG/1
1 TABLET ORAL EVERY 4 HOURS PRN
Status: DISCONTINUED | OUTPATIENT
Start: 2025-02-21 | End: 2025-02-23 | Stop reason: HOSPADM

## 2025-02-21 RX ORDER — POTASSIUM CHLORIDE 1500 MG/1
40 TABLET, EXTENDED RELEASE ORAL ONCE
Status: COMPLETED | OUTPATIENT
Start: 2025-02-21 | End: 2025-02-21

## 2025-02-21 RX ORDER — OXYCODONE HYDROCHLORIDE 5 MG/1
5 TABLET ORAL EVERY 4 HOURS PRN
Refills: 0 | Status: DISCONTINUED | OUTPATIENT
Start: 2025-02-21 | End: 2025-02-23 | Stop reason: HOSPADM

## 2025-02-21 RX ORDER — LORAZEPAM 2 MG/ML
1.5 INJECTION INTRAMUSCULAR
Status: DISCONTINUED | OUTPATIENT
Start: 2025-02-21 | End: 2025-02-23 | Stop reason: HOSPADM

## 2025-02-21 RX ORDER — LORAZEPAM 2 MG/ML
1 INJECTION INTRAMUSCULAR
Status: DISCONTINUED | OUTPATIENT
Start: 2025-02-21 | End: 2025-02-23 | Stop reason: HOSPADM

## 2025-02-21 RX ORDER — LORAZEPAM 0.5 MG/1
0.5 TABLET ORAL EVERY 4 HOURS PRN
Status: DISCONTINUED | OUTPATIENT
Start: 2025-02-21 | End: 2025-02-23 | Stop reason: HOSPADM

## 2025-02-21 RX ORDER — SODIUM CHLORIDE 9 MG/ML
500 INJECTION, SOLUTION INTRAVENOUS ONCE
Status: COMPLETED | OUTPATIENT
Start: 2025-02-21 | End: 2025-02-21

## 2025-02-21 RX ORDER — ONDANSETRON 2 MG/ML
4 INJECTION INTRAMUSCULAR; INTRAVENOUS EVERY 4 HOURS PRN
Status: DISCONTINUED | OUTPATIENT
Start: 2025-02-21 | End: 2025-02-23 | Stop reason: HOSPADM

## 2025-02-21 RX ORDER — AMOXICILLIN 250 MG
2 CAPSULE ORAL EVERY EVENING
Status: DISCONTINUED | OUTPATIENT
Start: 2025-02-21 | End: 2025-02-23 | Stop reason: HOSPADM

## 2025-02-21 RX ORDER — POLYETHYLENE GLYCOL 3350 17 G/17G
1 POWDER, FOR SOLUTION ORAL
Status: DISCONTINUED | OUTPATIENT
Start: 2025-02-21 | End: 2025-02-23 | Stop reason: HOSPADM

## 2025-02-21 RX ORDER — LORAZEPAM 2 MG/ML
2 INJECTION INTRAMUSCULAR
Status: DISCONTINUED | OUTPATIENT
Start: 2025-02-21 | End: 2025-02-23 | Stop reason: HOSPADM

## 2025-02-21 RX ORDER — SODIUM CHLORIDE 9 MG/ML
INJECTION, SOLUTION INTRAVENOUS CONTINUOUS
Status: DISCONTINUED | OUTPATIENT
Start: 2025-02-21 | End: 2025-02-23 | Stop reason: HOSPADM

## 2025-02-21 RX ORDER — PROMETHAZINE HYDROCHLORIDE 25 MG/1
12.5-25 SUPPOSITORY RECTAL EVERY 4 HOURS PRN
Status: DISCONTINUED | OUTPATIENT
Start: 2025-02-21 | End: 2025-02-23 | Stop reason: HOSPADM

## 2025-02-21 RX ORDER — HYDROMORPHONE HYDROCHLORIDE 1 MG/ML
0.5 INJECTION, SOLUTION INTRAMUSCULAR; INTRAVENOUS; SUBCUTANEOUS
Status: DISCONTINUED | OUTPATIENT
Start: 2025-02-21 | End: 2025-02-23 | Stop reason: HOSPADM

## 2025-02-21 RX ORDER — LORAZEPAM 1 MG/1
4 TABLET ORAL
Status: DISCONTINUED | OUTPATIENT
Start: 2025-02-21 | End: 2025-02-23 | Stop reason: HOSPADM

## 2025-02-21 RX ORDER — DIAZEPAM 5 MG/1
10 TABLET ORAL
Status: COMPLETED | OUTPATIENT
Start: 2025-02-21 | End: 2025-02-21

## 2025-02-21 RX ORDER — CALCIUM GLUCONATE 20 MG/ML
1 INJECTION, SOLUTION INTRAVENOUS ONCE
Status: COMPLETED | OUTPATIENT
Start: 2025-02-21 | End: 2025-02-21

## 2025-02-21 RX ORDER — PROCHLORPERAZINE EDISYLATE 5 MG/ML
5-10 INJECTION INTRAMUSCULAR; INTRAVENOUS EVERY 4 HOURS PRN
Status: DISCONTINUED | OUTPATIENT
Start: 2025-02-21 | End: 2025-02-23 | Stop reason: HOSPADM

## 2025-02-21 RX ORDER — PROMETHAZINE HYDROCHLORIDE 25 MG/1
12.5-25 TABLET ORAL EVERY 4 HOURS PRN
Status: DISCONTINUED | OUTPATIENT
Start: 2025-02-21 | End: 2025-02-23 | Stop reason: HOSPADM

## 2025-02-21 RX ORDER — LORAZEPAM 1 MG/1
2 TABLET ORAL
Status: DISCONTINUED | OUTPATIENT
Start: 2025-02-21 | End: 2025-02-23 | Stop reason: HOSPADM

## 2025-02-21 RX ORDER — MAGNESIUM SULFATE HEPTAHYDRATE 40 MG/ML
2 INJECTION, SOLUTION INTRAVENOUS ONCE
Status: COMPLETED | OUTPATIENT
Start: 2025-02-21 | End: 2025-02-21

## 2025-02-21 RX ORDER — HYDROMORPHONE HYDROCHLORIDE 1 MG/ML
0.25 INJECTION, SOLUTION INTRAMUSCULAR; INTRAVENOUS; SUBCUTANEOUS
Status: DISCONTINUED | OUTPATIENT
Start: 2025-02-21 | End: 2025-02-23 | Stop reason: HOSPADM

## 2025-02-21 RX ORDER — LORAZEPAM 2 MG/ML
0.5 INJECTION INTRAMUSCULAR EVERY 4 HOURS PRN
Status: DISCONTINUED | OUTPATIENT
Start: 2025-02-21 | End: 2025-02-23 | Stop reason: HOSPADM

## 2025-02-21 RX ORDER — LORAZEPAM 1 MG/1
3 TABLET ORAL
Status: DISCONTINUED | OUTPATIENT
Start: 2025-02-21 | End: 2025-02-23 | Stop reason: HOSPADM

## 2025-02-21 RX ORDER — CHLORDIAZEPOXIDE HYDROCHLORIDE 25 MG/1
25 CAPSULE, GELATIN COATED ORAL EVERY 6 HOURS
Status: DISCONTINUED | OUTPATIENT
Start: 2025-02-22 | End: 2025-02-23 | Stop reason: HOSPADM

## 2025-02-21 RX ORDER — ACETAMINOPHEN 325 MG/1
650 TABLET ORAL EVERY 6 HOURS PRN
Status: DISCONTINUED | OUTPATIENT
Start: 2025-02-21 | End: 2025-02-23 | Stop reason: HOSPADM

## 2025-02-21 RX ORDER — CHLORDIAZEPOXIDE HYDROCHLORIDE 25 MG/1
50 CAPSULE, GELATIN COATED ORAL EVERY 6 HOURS
Status: COMPLETED | OUTPATIENT
Start: 2025-02-21 | End: 2025-02-22

## 2025-02-21 RX ADMIN — SENNOSIDES AND DOCUSATE SODIUM 2 TABLET: 50; 8.6 TABLET ORAL at 17:05

## 2025-02-21 RX ADMIN — SODIUM CHLORIDE 500 ML: 9 INJECTION, SOLUTION INTRAVENOUS at 19:45

## 2025-02-21 RX ADMIN — CHLORDIAZEPOXIDE HYDROCHLORIDE 50 MG: 25 CAPSULE ORAL at 17:05

## 2025-02-21 RX ADMIN — SODIUM CHLORIDE 500 ML: 9 INJECTION, SOLUTION INTRAVENOUS at 15:20

## 2025-02-21 RX ADMIN — CALCIUM GLUCONATE 1 G: 20 INJECTION, SOLUTION INTRAVENOUS at 08:37

## 2025-02-21 RX ADMIN — LORAZEPAM 1 MG: 1 TABLET ORAL at 08:33

## 2025-02-21 RX ADMIN — SODIUM CHLORIDE: 9 INJECTION, SOLUTION INTRAVENOUS at 16:43

## 2025-02-21 RX ADMIN — CHLORDIAZEPOXIDE HYDROCHLORIDE 50 MG: 25 CAPSULE ORAL at 23:58

## 2025-02-21 RX ADMIN — SODIUM CHLORIDE: 9 INJECTION, SOLUTION INTRAVENOUS at 07:57

## 2025-02-21 RX ADMIN — HYDROMORPHONE HYDROCHLORIDE 0.5 MG: 1 INJECTION, SOLUTION INTRAMUSCULAR; INTRAVENOUS; SUBCUTANEOUS at 07:34

## 2025-02-21 RX ADMIN — POTASSIUM CHLORIDE 40 MEQ: 1500 TABLET, EXTENDED RELEASE ORAL at 07:33

## 2025-02-21 RX ADMIN — MAGNESIUM SULFATE HEPTAHYDRATE 2 G: 2 INJECTION, SOLUTION INTRAVENOUS at 16:44

## 2025-02-21 RX ADMIN — HYOSCYAMINE SULFATE 30 ML: 0.12 ELIXIR ORAL at 00:30

## 2025-02-21 RX ADMIN — FAMOTIDINE 20 MG: 10 INJECTION, SOLUTION INTRAVENOUS at 00:31

## 2025-02-21 RX ADMIN — POTASSIUM CHLORIDE 40 MEQ: 1500 TABLET, EXTENDED RELEASE ORAL at 15:11

## 2025-02-21 RX ADMIN — LORAZEPAM 0.5 MG: 0.5 TABLET ORAL at 04:14

## 2025-02-21 RX ADMIN — CHLORDIAZEPOXIDE HYDROCHLORIDE 50 MG: 25 CAPSULE ORAL at 11:33

## 2025-02-21 RX ADMIN — DIAZEPAM 10 MG: 5 TABLET ORAL at 02:44

## 2025-02-21 RX ADMIN — ONDANSETRON 4 MG: 2 INJECTION INTRAMUSCULAR; INTRAVENOUS at 02:38

## 2025-02-21 RX ADMIN — LORAZEPAM 1 MG: 1 TABLET ORAL at 17:05

## 2025-02-21 RX ADMIN — LORAZEPAM 1 MG: 1 TABLET ORAL at 12:34

## 2025-02-21 RX ADMIN — DIAZEPAM 10 MG: 5 TABLET ORAL at 00:28

## 2025-02-21 RX ADMIN — OXYCODONE 5 MG: 5 TABLET ORAL at 15:11

## 2025-02-21 ASSESSMENT — LIFESTYLE VARIABLES
NAUSEA AND VOMITING: NO NAUSEA AND NO VOMITING
ORIENTATION AND CLOUDING OF SENSORIUM: ORIENTED AND CAN DO SERIAL ADDITIONS
HAVE PEOPLE ANNOYED YOU BY CRITICIZING YOUR DRINKING: YES
AUDITORY DISTURBANCES: NOT PRESENT
AUDITORY DISTURBANCES: NOT PRESENT
AGITATION: NORMAL ACTIVITY
ANXIETY: *
TREMOR: TREMOR NOT VISIBLE BUT CAN BE FELT, FINGERTIP TO FINGERTIP
HEADACHE, FULLNESS IN HEAD: NOT PRESENT
PAROXYSMAL SWEATS: BARELY PERCEPTIBLE SWEATING. PALMS MOIST
ORIENTATION AND CLOUDING OF SENSORIUM: ORIENTED AND CAN DO SERIAL ADDITIONS
HAVE YOU EVER FELT YOU SHOULD CUT DOWN ON YOUR DRINKING: YES
VISUAL DISTURBANCES: NOT PRESENT
AUDITORY DISTURBANCES: NOT PRESENT
NAUSEA AND VOMITING: NO NAUSEA AND NO VOMITING
PAROXYSMAL SWEATS: NO SWEAT VISIBLE
TREMOR: TREMOR NOT VISIBLE BUT CAN BE FELT, FINGERTIP TO FINGERTIP
ORIENTATION AND CLOUDING OF SENSORIUM: ORIENTED AND CAN DO SERIAL ADDITIONS
TOTAL SCORE: VERY MILD ITCHING, PINS AND NEEDLES SENSATION, BURNING OR NUMBNESS
ALCOHOL_USE: YES
AUDITORY DISTURBANCES: NOT PRESENT
HEADACHE, FULLNESS IN HEAD: NOT PRESENT
ANXIETY: *
VISUAL DISTURBANCES: NOT PRESENT
AUDITORY DISTURBANCES: NOT PRESENT
TOTAL SCORE: 2
TOTAL SCORE: 4
AVERAGE NUMBER OF DAYS PER WEEK YOU HAVE A DRINK CONTAINING ALCOHOL: 7
ORIENTATION AND CLOUDING OF SENSORIUM: ORIENTED AND CAN DO SERIAL ADDITIONS
HEADACHE, FULLNESS IN HEAD: NOT PRESENT
NAUSEA AND VOMITING: NO NAUSEA AND NO VOMITING
HOW MANY TIMES IN THE PAST YEAR HAVE YOU HAD 5 OR MORE DRINKS IN A DAY: 100
VISUAL DISTURBANCES: NOT PRESENT
NAUSEA AND VOMITING: NO NAUSEA AND NO VOMITING
ANXIETY: NO ANXIETY (AT EASE)
EVER HAD A DRINK FIRST THING IN THE MORNING TO STEADY YOUR NERVES TO GET RID OF A HANGOVER: YES
TOTAL SCORE: 4
TOTAL SCORE: 8
TREMOR: *
PAROXYSMAL SWEATS: NO SWEAT VISIBLE
NAUSEA AND VOMITING: MILD NAUSEA WITH NO VOMITING
AGITATION: NORMAL ACTIVITY
ANXIETY: MILDLY ANXIOUS
AGITATION: NORMAL ACTIVITY
VISUAL DISTURBANCES: NOT PRESENT
ANXIETY: *
VISUAL DISTURBANCES: NOT PRESENT
VISUAL DISTURBANCES: NOT PRESENT
ANXIETY: MILDLY ANXIOUS
NAUSEA AND VOMITING: MILD NAUSEA WITH NO VOMITING
ORIENTATION AND CLOUDING OF SENSORIUM: ORIENTED AND CAN DO SERIAL ADDITIONS
TOTAL SCORE: MILD ITCHING, PINS AND NEEDLES SENSATION, BURNING OR NUMBNESS
ORIENTATION AND CLOUDING OF SENSORIUM: ORIENTED AND CAN DO SERIAL ADDITIONS
TOTAL SCORE: 6
AGITATION: NORMAL ACTIVITY
AGITATION: NORMAL ACTIVITY
NAUSEA AND VOMITING: NO NAUSEA AND NO VOMITING
TOTAL SCORE: 8
HEADACHE, FULLNESS IN HEAD: NOT PRESENT
TOTAL SCORE: 9
ANXIETY: *
VISUAL DISTURBANCES: NOT PRESENT
TREMOR: NO TREMOR
TOTAL SCORE: 4
HEADACHE, FULLNESS IN HEAD: NOT PRESENT
PAROXYSMAL SWEATS: BARELY PERCEPTIBLE SWEATING. PALMS MOIST
HEADACHE, FULLNESS IN HEAD: NOT PRESENT
AUDITORY DISTURBANCES: NOT PRESENT
AUDITORY DISTURBANCES: NOT PRESENT
DOES PATIENT WANT TO TALK TO SOMEONE ABOUT QUITTING: YES
PAROXYSMAL SWEATS: *
PAROXYSMAL SWEATS: *
TOTAL SCORE: 2
ON A TYPICAL DAY WHEN YOU DRINK ALCOHOL HOW MANY DRINKS DO YOU HAVE: 8
HEADACHE, FULLNESS IN HEAD: NOT PRESENT
TOTAL SCORE: 2
TREMOR: TREMOR NOT VISIBLE BUT CAN BE FELT, FINGERTIP TO FINGERTIP
PAROXYSMAL SWEATS: *
TREMOR: *
CONSUMPTION TOTAL: POSITIVE
AGITATION: NORMAL ACTIVITY
DOES PATIENT WANT TO STOP DRINKING: YES
EVER FELT BAD OR GUILTY ABOUT YOUR DRINKING: YES
AGITATION: NORMAL ACTIVITY
TREMOR: NO TREMOR
ORIENTATION AND CLOUDING OF SENSORIUM: ORIENTED AND CAN DO SERIAL ADDITIONS
TOTAL SCORE: VERY MILD ITCHING, PINS AND NEEDLES SENSATION, BURNING OR NUMBNESS

## 2025-02-21 ASSESSMENT — PATIENT HEALTH QUESTIONNAIRE - PHQ9
SUM OF ALL RESPONSES TO PHQ9 QUESTIONS 1 AND 2: 0
1. LITTLE INTEREST OR PLEASURE IN DOING THINGS: NOT AT ALL
2. FEELING DOWN, DEPRESSED, IRRITABLE, OR HOPELESS: NOT AT ALL
1. LITTLE INTEREST OR PLEASURE IN DOING THINGS: NOT AT ALL
SUM OF ALL RESPONSES TO PHQ9 QUESTIONS 1 AND 2: 0
2. FEELING DOWN, DEPRESSED, IRRITABLE, OR HOPELESS: NOT AT ALL

## 2025-02-21 ASSESSMENT — ENCOUNTER SYMPTOMS
COUGH: 0
WEAKNESS: 0
VOMITING: 0
HEADACHES: 0
BRUISES/BLEEDS EASILY: 0
DIZZINESS: 0
SORE THROAT: 0
DOUBLE VISION: 0
NAUSEA: 0
DEPRESSION: 0
BLURRED VISION: 0
INSOMNIA: 0
MYALGIAS: 0
SHORTNESS OF BREATH: 0
FEVER: 0
PALPITATIONS: 0
FALLS: 1
NECK PAIN: 0

## 2025-02-21 ASSESSMENT — PAIN DESCRIPTION - PAIN TYPE
TYPE: ACUTE PAIN

## 2025-02-21 ASSESSMENT — SOCIAL DETERMINANTS OF HEALTH (SDOH)
WITHIN THE PAST 12 MONTHS, YOU WORRIED THAT YOUR FOOD WOULD RUN OUT BEFORE YOU GOT THE MONEY TO BUY MORE: NEVER TRUE
WITHIN THE LAST YEAR, HAVE YOU BEEN KICKED, HIT, SLAPPED, OR OTHERWISE PHYSICALLY HURT BY YOUR PARTNER OR EX-PARTNER?: NO
IN THE PAST 12 MONTHS, HAS THE ELECTRIC, GAS, OIL, OR WATER COMPANY THREATENED TO SHUT OFF SERVICE IN YOUR HOME?: NO
WITHIN THE LAST YEAR, HAVE YOU BEEN AFRAID OF YOUR PARTNER OR EX-PARTNER?: NO
WITHIN THE LAST YEAR, HAVE TO BEEN RAPED OR FORCED TO HAVE ANY KIND OF SEXUAL ACTIVITY BY YOUR PARTNER OR EX-PARTNER?: NO
WITHIN THE LAST YEAR, HAVE YOU BEEN HUMILIATED OR EMOTIONALLY ABUSED IN OTHER WAYS BY YOUR PARTNER OR EX-PARTNER?: NO
WITHIN THE PAST 12 MONTHS, THE FOOD YOU BOUGHT JUST DIDN'T LAST AND YOU DIDN'T HAVE MONEY TO GET MORE: NEVER TRUE

## 2025-02-21 ASSESSMENT — COGNITIVE AND FUNCTIONAL STATUS - GENERAL
SUGGESTED CMS G CODE MODIFIER DAILY ACTIVITY: CL
DAILY ACTIVITIY SCORE: 12
TOILETING: A LOT
SUGGESTED CMS G CODE MODIFIER MOBILITY: CH
EATING MEALS: A LOT
MOBILITY SCORE: 24
PERSONAL GROOMING: A LOT
HELP NEEDED FOR BATHING: A LOT
DRESSING REGULAR UPPER BODY CLOTHING: A LOT
DRESSING REGULAR LOWER BODY CLOTHING: A LOT

## 2025-02-21 ASSESSMENT — FIBROSIS 4 INDEX
FIB4 SCORE: 1.94
FIB4 SCORE: 1.94

## 2025-02-21 NOTE — ASSESSMENT & PLAN NOTE
Acute comminuted olecranon fracture involving the coronoid process.  I discussed with orthopedic surgeon, Dr. Sanchez Hampton will plan to operate on 2/25 that he remains in the hospital  If he is ready to discharge before then, he will follow-up with Dr. Hampton as an outpatient.  As needed oral oxycodone

## 2025-02-21 NOTE — ED PROVIDER NOTES
ED Provider Note    CHIEF COMPLAINT  Chief Complaint   Patient presents with    GLF     Pt fell on icy concrete Wednesday    Arm Injury     Right arm painful,  bruised and swollen especially around elbow    Alcohol Intoxication     Pt has been drinking for last few days     EXTERNAL RECORDS REVIEWED  Outpatient Notes from prior outpatient encounter regarding sore throat, history of anxiety, alcohol withdrawal.    HPI/ROS  LIMITATION TO HISTORY   Select: Intoxication  OUTSIDE HISTORIAN(S):  Friend at bedside    Maxime Fernandez is a 39 y.o. male history of chronic alcohol abuse who presents urgency room for acute right arm pain.  The patient states he fell yesterday, you have been walking outside with his dog and thought he was stepping on water but this was ice.  He slipped awkwardly, falling and landing mostly on his right forearm and elbow.  There is been pain and discomfort and bruising that has been developing.  He is denying numbness or tingling into his hand, he is able to move and close his hand and wrist.  He has pain and difficulty exacerbated with attempts to move his elbow but no shoulder discomfort.  He is denying any head or neck tenderness and no headaches.  He says he has been drinking to dull the pain, he does have a history of having withdrawal and shakes if he stops.  He is denying any blood thinner use, believes he has a history of hypertension but is not currently on medications.  He has no other acute constitutional complaints at this time.    PAST MEDICAL HISTORY   has a past medical history of Anxiety, Depression, GERD (gastroesophageal reflux disease), and Psychiatric disorder.    SURGICAL HISTORY  patient denies any surgical history    FAMILY HISTORY  Family History   Problem Relation Age of Onset    Thyroid Mother     Hyperlipidemia Father     No Known Problems Sister     No Known Problems Brother        SOCIAL HISTORY  Social History     Tobacco Use    Smoking status: Never     "Smokeless tobacco: Never   Vaping Use    Vaping status: Never Used   Substance and Sexual Activity    Alcohol use: Yes     Comment: 1 pint a day for the last 2 weeks    Drug use: No    Sexual activity: Yes     Partners: Female     Birth control/protection: Pill     CURRENT MEDICATIONS  Home Medications       Reviewed by Cristela Hill R.N. (Registered Nurse) on 02/20/25 at 2144  Med List Status: Not Addressed     Medication Last Dose Status   multivitamin Tab  Active   pantoprazole (PROTONIX) 40 MG Tablet Delayed Response  Active   promethazine-dextromethorphan (PROMETHAZINE-DM) 6.25-15 MG/5ML syrup  Active                  Audit from Redirected Encounters    **Home medications have not yet been reviewed for this encounter**       ALLERGIES  Allergies   Allergen Reactions    Inderal [Propranolol]      PHYSICAL EXAM  VITAL SIGNS: BP (!) 158/76   Pulse (!) 128   Resp 18   Ht 1.803 m (5' 11\")   Wt 91.9 kg (202 lb 9.6 oz)   SpO2 96%   BMI 28.26 kg/m²    Genl: M sitting in [facility] uncomfortably, speaking slightly slurred, appears in mild distress   Head: NC/AT   ENT: Mucous membranes moist, posterior pharynx clear, uvula midline, nares patent bilaterally   Eyes: Normal sclera, pupils equal round reactive to light  Neck: Supple, FROM, no LAD appreciated   Pulmonary: Lungs are clear to auscultation bilaterally  Chest: No TTP  CV: tachycardia, no murmur appreciated, pulses 2+ in both upper and lower extremities,  Abdomen: soft, NT/ND; no rebound/guarding, no masses palpated, no HSM   : no CVA or suprapubic tenderness   Musculoskeletal: Pain free ROM of the neck. Moving upper and lower extremities in spontaneous and coordinated fashion  Right Upper Extremity  - Skin: Sensitive bruising along the lateral and medial aspects of the elbow, proximal forearm.  There is pain with palpation of these areas, no pain with palpation of any aspect of the wrist over the distal portion of his hand.  No pain with palpation over " the shoulder or humeral sections.  Aside from the developing hematoma there is no evidence of puncture injuries, no abrasions or lacerations noted.  - Motor: Full ROM at shoulder, wrist;Elbow limited by pain both with active and passive ROM. 5/5 wrist flexion/extension, thumb IP joint flexion/extension (AIN/PIN), abduction/adduction (ulnar)  - Sensation intact to median/ulnar/radial nerves  - 2+ radial pulse, < 2 sec cap refill x 5 digits    EKG/LABS  Labs Reviewed   CBC WITH DIFFERENTIAL - Abnormal; Notable for the following components:       Result Value    WBC 11.1 (*)     MCHC 36.9 (*)     Neutrophils-Polys 83.90 (*)     Lymphocytes 10.00 (*)     Neutrophils (Absolute) 9.28 (*)     All other components within normal limits   COMP METABOLIC PANEL - Abnormal; Notable for the following components:    Potassium 3.4 (*)     Chloride 92 (*)     Co2 16 (*)     Anion Gap 28.0 (*)     Glucose 155 (*)     Calcium 7.9 (*)     Correct Calcium 7.7 (*)     AST(SGOT) 60 (*)     All other components within normal limits   APTT - Abnormal; Notable for the following components:    APTT 23.0 (*)     All other components within normal limits   ETHYL ALCOHOL (BLOOD) - Abnormal; Notable for the following components:    Diagnostic Alcohol 282.0 (*)     All other components within normal limits   PROTHROMBIN TIME   ESTIMATED GFR     RADIOLOGY/PROCEDURES   I have independently interpreted the diagnostic imaging associated with this visit and am waiting the final reading from the radiologist.   My preliminary interpretation is as follows: Comminuted olecranon fracture    Radiologist interpretation:  DX-FOREARM RIGHT   Final Result         1.  Comminuted olecranon fracture which appears to involve the base of the coronoid process.      DX-ELBOW-COMPLETE 3+ RIGHT   Final Result         1.  Comminuted olecranon fracture which appears to involve the base of the coronoid process.         CT-ELBOW W/O PLUS RECONS RIGHT    (Results Pending)        COURSE & MEDICAL DECISION MAKING    ASSESSMENT, COURSE AND PLAN  Care Narrative: This emergency room for symptoms as described above.  He is a 39-year-old male, he has a history of chronic intermittent alcohol use and appears intoxicated though he is speaking full sentences, he may have some element of chronic alcohol intoxication baseline.  He is showing some agitation and tremulousness CIWA score is 14.  He is tachycardic into the 130s and hypertensive.  He has evidence of trauma only on the right elbow and forearm and x-ray images are obtained.  Will get an image of the chest secondary to the way that he fell however he is refusing this.  He is not hypoxic or tachypneic at this time and is able to verbalize my concerns and this does seem somewhat reasonable.  The access was established in anticipation of a possible metabolic derangements or need for further medications for dealing with alcohol withdrawal.  He would like to try oral medications and is given fluid resuscitation while oral Valium is administered.    Workup was remarkable for borderline hypokalemia, anion gap of 28 with a bicarb of 16 likely due to alcohol alcoholic ketosis.  He has hypocalcemia at 7.7 with normal liver enzymes and no evidence of obstructive hepatobiliary process.  There is no coagulopathy or anemia.  Alcohol level is 282.    Following administration of oral medications and fluids patient's heart rate is coming down to the 120s.  Serial CIWA scores are obtained.    Patient's x-ray of the forearm and elbow shows a comminuted fracture of the olecranon following the base of the coronoid process.  I have spoken with orthopedics, recommend no immobilization.  If he stays here in the hospital he would have his trauma orthopedist come see the patient tomorrow with no definitive surgery planned until the patient is safe and no longer withdrawing.  Outpatient options are also available to be happy to see them in the clinic at the  patient is safely able to be discharged.    Patient continues to be tachycardic, having overall constitutional findings that have a CIWA score of 12.  I do believe that he would benefit from inpatient admission, further fluids resuscitation and benzodiazepine administration.  Additionally the patient can have surgical consultation with [facility] orthopedics as an inpatient.  At this time he is amenable to admission, I spoke with the hospitalist will admit the patient guarded condition.    Hydration: Based on the patient's presentation of CIWA, Dehydration, and Tachycardia the patient was given IV fluids. IV Hydration was used because oral hydration was not adequate alone. Upon recheck following hydration, the patient was improving.    FINAL DIAGNOSIS  1. Alcoholic ketosis (HCC)    2. Hypocalcemia    3. Fall, initial encounter    4. Closed fracture of right elbow, initial encounter    5. Alcoholic intoxication with complication (HCC)    6. Tachycardia      Electronically signed by: Bret Coats M.D., 2/20/2025 10:07 PM

## 2025-02-21 NOTE — PROGRESS NOTES
Med Rec completed  Allergies reviewed  No ORAL antibiotics in last 30 days    Patient is not taking anticoagulants

## 2025-02-21 NOTE — H&P
Hospital Medicine History & Physical Note    Date of Service  2/21/2025    Primary Care Physician  Nya Pearson P.A.-C.    Consultants  orthopedics    Specialist Names: ERP discussed with Sanchez    Code Status  Full Code    Chief Complaint  Chief Complaint   Patient presents with    GLF     Pt fell on icy concrete Wednesday    Arm Injury     Right arm painful,  bruised and swollen especially around elbow    Alcohol Intoxication     Pt has been drinking for last few days       History of Presenting Illness  Maxime Fernandez is a 39 y.o. male, with history of EtOH abuse, who presented to the emergency department on 2/20/2025 for evaluation of right elbow pain.  Patient had a ground-level fall 2 days ago while walking his dog, stepping on ice patch and falling over his right elbow and forearm.  He has been in pain ever since with worsening bruising and swelling.  He denies any numbness of his hand and pain is exacerbated with movement.  Patient is not taking any blood thinner.  He has been drinking heavy (at least 12 shots of whiskey) over the last 2 weeks.  He also reports being sober for the last 2 years.    I discussed the plan of care with patient and ERP Dr. Coats .    Review of Systems  Review of Systems   Constitutional:  Positive for malaise/fatigue. Negative for fever.   HENT:  Negative for congestion and sore throat.    Eyes:  Negative for blurred vision and double vision.   Respiratory:  Negative for cough and shortness of breath.    Cardiovascular:  Negative for chest pain and palpitations.   Gastrointestinal:  Negative for nausea and vomiting.   Genitourinary:  Negative for dysuria and urgency.   Musculoskeletal:  Positive for falls and joint pain. Negative for myalgias and neck pain.   Skin:  Negative for itching and rash.   Neurological:  Negative for dizziness, weakness and headaches.   Endo/Heme/Allergies:  Does not bruise/bleed easily.   Psychiatric/Behavioral:  Negative for depression. The  patient does not have insomnia.        Past Medical History   has a past medical history of Anxiety, Depression, GERD (gastroesophageal reflux disease), and Psychiatric disorder.    Surgical History  Denies prior surgical history    Family History  family history includes Hyperlipidemia in his father; No Known Problems in his brother and sister; Thyroid in his mother.   Family history reviewed with patient. There is no family history that is pertinent to the chief complaint.     Social History   reports that he has never smoked. He has never used smokeless tobacco. He reports current alcohol use. He reports that he does not use drugs.    Allergies  Allergies   Allergen Reactions    Inderal [Propranolol]        Medications  Prior to Admission Medications   Prescriptions Last Dose Informant Patient Reported? Taking?   multivitamin Tab   No No   Sig: Take 1 Tablet by mouth every day.   Patient not taking: Reported on 2/20/2025   pantoprazole (PROTONIX) 40 MG Tablet Delayed Response   No No   Sig: Take 1 Tablet by mouth every day.   Patient not taking: Reported on 2/20/2025   promethazine-dextromethorphan (PROMETHAZINE-DM) 6.25-15 MG/5ML syrup   No No   Sig: Take 5 mL by mouth 3 times a day as needed for Cough.   Patient not taking: Reported on 2/20/2025      Facility-Administered Medications: None       Physical Exam  Temp:  [37.1 °C (98.8 °F)] 37.1 °C (98.8 °F)  Pulse:  [128-147] 128  Resp:  [18-20] 18  BP: (157-163)/(76-99) 158/76  SpO2:  [92 %-98 %] 96 %  Blood Pressure: (!) 158/76       Pulse: (!) 128   Respiration: 18   Pulse Oximetry: 96 %       Physical Exam  Constitutional:       Appearance: Normal appearance.   HENT:      Head: Normocephalic and atraumatic.      Nose: Nose normal.      Mouth/Throat:      Mouth: Mucous membranes are moist.      Pharynx: Oropharynx is clear.   Eyes:      Extraocular Movements: Extraocular movements intact.      Pupils: Pupils are equal, round, and reactive to light.    Cardiovascular:      Rate and Rhythm: Normal rate and regular rhythm.      Pulses: Normal pulses.      Heart sounds: Normal heart sounds.   Pulmonary:      Effort: Pulmonary effort is normal.      Breath sounds: Normal breath sounds.   Abdominal:      General: Abdomen is flat. Bowel sounds are normal.      Palpations: Abdomen is soft.   Musculoskeletal:      Cervical back: Normal range of motion and neck supple.      Comments: Right elbow has been immobilized in the emergency department.   Skin:     General: Skin is warm and dry.   Neurological:      General: No focal deficit present.      Mental Status: He is alert and oriented to person, place, and time.   Psychiatric:         Mood and Affect: Mood normal.         Behavior: Behavior normal.         Laboratory:  Recent Labs     02/20/25 2213   WBC 11.1*   RBC 5.19   HEMOGLOBIN 16.2   HEMATOCRIT 43.9   MCV 84.6   MCH 31.2   MCHC 36.9*   RDW 37.5   PLATELETCT 219   MPV 10.3     Recent Labs     02/20/25  2213   SODIUM 136   POTASSIUM 3.4*   CHLORIDE 92*   CO2 16*   GLUCOSE 155*   BUN 18   CREATININE 1.07   CALCIUM 7.9*     Recent Labs     02/20/25  2213   ALTSGPT 32   ASTSGOT 60*   ALKPHOSPHAT 82   TBILIRUBIN 0.8   GLUCOSE 155*     Recent Labs     02/20/25  2213   APTT 23.0*   INR 0.94       Imaging:  DX-FOREARM RIGHT   Final Result         1.  Comminuted olecranon fracture which appears to involve the base of the coronoid process.      DX-ELBOW-COMPLETE 3+ RIGHT   Final Result         1.  Comminuted olecranon fracture which appears to involve the base of the coronoid process.         CT-ELBOW W/O PLUS RECONS RIGHT    (Results Pending)       X-Ray:  I have personally reviewed the images and compared with prior images. and My impression is: Right forearm x-ray showing comminuted olecranon fracture involving coronoid process.    Assessment/Plan:  Justification for Admission Status  I anticipate this patient will require at least two midnights for appropriate medical  management, necessitating inpatient admission because 47-year-old male with acute olecranon comminuted fracture under the context of alcohol intoxication with mild ketosis and high risk for withdrawal    * Tachycardia  Assessment & Plan  -Inpatient status on telemetry.  -Patient with persistent tachycardia and heart rate of 130 bpm.  This is likely secondary to metabolic disarrangement.  He is receiving IV fluids and I will recheck his BMP in the morning.  -Additionally, early alcohol withdrawal is not excluded even with high doses of EtOH on admission of 282.  He would have to be closely monitored for alcohol withdrawal.    Olecranon fracture, right, closed, initial encounter  Assessment & Plan  -He has acute comminuted olecranon fracture involving the coronoid process.  -ERP discussed this case with orthopedic surgeon, Dr. Bradford..  Given the patient will be hospitalized, he will be evaluated as will likely need at some point for surgical procedure.  -I appreciate orthopedic consult and recommendations.  -Patient will need IV pain medication for now, attempt to transition to oral pain medication when possible.      Alcoholic ketosis (HCC)- (present on admission)  Assessment & Plan  -Initial anion gap was 28 with CO2 of 16.  Patient received IV fluids, calcium gluconate.  I added a repeat a BMP for the morning.  -Initial alcohol level is 282.  High risk for alcohol withdrawal and CIWA protocol has been added.    Hypokalemia  Assessment & Plan  -Initial potassium is 3.4 on admission.  I am adding potassium replacement and will monitor chemistry panel in the morning.    Anxiety- (present on admission)  Assessment & Plan  -Patient has now CIWA protocol order therefore I will hold off ordering additional benzodiazepine at this point.        VTE prophylaxis: SCDs/TEDs

## 2025-02-21 NOTE — ED TRIAGE NOTES
"Pt here with c/o    Chief Complaint   Patient presents with    GLF     Pt fell on [place] concrete Wednesday    Arm Injury     Right arm painful,  bruised and swollen especially around elbow    Alcohol Intoxication     Pt has been drinking for last few days       Ht 1.803 m (5' 11\")   Wt 91.9 kg (202 lb 9.6 oz)   BMI 28.26 kg/m²   "

## 2025-02-21 NOTE — ASSESSMENT & PLAN NOTE
Was having heart rate as high as 140s, sustained.  This improved with fluid bolus  He has multiple electrolyte derangements including hypokalemia, hypocalcemia, hypophosphatemia, hypomagnesemia and hyponatremia.  Replace potassium, magnesium  Replace phosphorus and calcium  I reviewed EKG which showed sinus tachycardia, troponin negative  Heart rate in the low 100s this morning  Continue IV fluids  Alcohol withdrawal protocol/benzos  Pain control  Continue telemetry

## 2025-02-21 NOTE — CARE PLAN
The patient is Stable - Low risk of patient condition declining or worsening    Shift Goals  Clinical Goals: Pain Control / CIWA monitoring  Patient Goals: Sleep Comfortably  Family Goals: BRENDAN    Progress made toward(s) clinical / shift goals:  Pt CIWA monitoring. Pt CIWA increased from 6 to 9 avg. Vitals within normal limits.    Patient is not progressing towards the following goals:

## 2025-02-21 NOTE — PROGRESS NOTES
Please see the H&P Dictated After Midnight for full details    Interval Progress Note:  Patient seen and examined    39-year-old male with history of alcohol abuse admitted with severe right arm pain after falling on the ice on Wednesday evening.  He was found to have a right olecranon and ulnar fracture.  Immobilization splint was placed and orthopedic surgery was consulted.  They feel he may need a surgery with in the next few days however, this will be done pending appropriate surgical planning and evaluating alcohol withdrawal trajectory.    Today, he is feeling nauseated and anxious.  Beginning to have diaphoresis and headache.    Discussed with orthopedic surgery, maria elena for a diet    Plan:  Advance diet to general pending surgical date  Add scheduled Librium 50 every 6, taper after 4 doses  Continue CIWA protocol  Continue IV fluids at 75/h  Pain control with IV Dilaudid as needed, add oxycodone for an oral option    Fernanda Scott D.O.

## 2025-02-21 NOTE — CONSULTS
"Orthopaedic Surgery Consult Note:    Abel Bradford M.D.  Date & Time note created:    2/21/2025   1:31 PM     Referring MD:  Dr. Coats    Patient ID:   Name:             Maxime Young     YOB: 1985  Age:                 39 y.o.  male   MRN:               [id number]                                                             Reason for Consult:      Right olecranon fracture     History of Present Illness:    39M presented to ED with right elbow pain after slipping and landing on his right arm.  Found to have comminuted olecranon fracture, placed into a splint.  Admitted for pain management as well as EtOH detox.  Denies any numbness or tingling in right hand.  Denies prior injuries to right elbow.  He is left hand dominant.  History of EtOH use.    Review of Systems:      See HPI            Past Medical History:   Past Medical History:   Diagnosis Date    Anxiety     Depression     GERD (gastroesophageal reflux disease)     Psychiatric disorder     anxiety      Active Hospital Problems    Diagnosis     Alcoholic ketosis (HCC) [E88.89]     Hypokalemia [E87.6]     Olecranon fracture, right, closed, initial encounter [S52.021A]     Tachycardia [R00.0]     Anxiety [F41.9]        Past Surgical History:  History reviewed. No pertinent surgical history.    Current Outpatient Medications:  No medications prior to admission.       Medication Allergy:  Allergies   Allergen Reactions    Inderal [Propranolol]        Physical Exam:  Vitals/ General Appearance:   Weight/BMI: Body mass index is 27.03 kg/m².  BP (!) 168/97   Pulse (!) 117   Temp 37.4 °C (99.4 °F) (Temporal)   Resp 19   Ht 1.803 m (5' 11\")   Wt 87.9 kg (193 lb 12.6 oz)   SpO2 94%   Vitals:    02/21/25 0411 02/21/25 0613 02/21/25 0819 02/21/25 1220   BP: (!) 149/98 (!) 143/96 (!) 136/94 (!) 168/97   Pulse:  (!) 119 (!) 122 (!) 117   Resp:  19 19 19   Temp:  36.8 °C (98.2 °F) 36.6 °C (97.9 °F) 37.4 °C (99.4 °F)   TempSrc:  Oral Oral " Temporal   SpO2:  94% 91% 94%   Weight:  87.9 kg (193 lb 12.6 oz)     Height:           Constitutional:   alert, oriented, no acute distress  Cardiovascular:  regular rate, systemically well perfused  Lungs: no increased work of breathing  Neurologic: no focal deficits noted  Psychiatric: affect normal  Musculoskeletal: RUE  Long arm splint in place with sling.  Sensation intact r/m/u.  Motor intact finger and thumb flexion/extension/adduction, able to make a complete fist.  Fingers well perfused with good capillary refill    Lab Data Review:  Recent Results (from the past 24 hours)   CBC WITH DIFFERENTIAL    Collection Time: 02/20/25 10:13 PM   Result Value Ref Range    WBC 11.1 (H) 4.8 - 10.8 K/uL    RBC 5.19 4.70 - 6.10 M/uL    Hemoglobin 16.2 14.0 - 18.0 g/dL    Hematocrit 43.9 42.0 - 52.0 %    MCV 84.6 81.4 - 97.8 fL    MCH 31.2 27.0 - 33.0 pg    MCHC 36.9 (H) 32.3 - 36.5 g/dL    RDW 37.5 35.9 - 50.0 fL    Platelet Count 219 164 - 446 K/uL    MPV 10.3 9.0 - 12.9 fL    Neutrophils-Polys 83.90 (H) 44.00 - 72.00 %    Lymphocytes 10.00 (L) 22.00 - 41.00 %    Monocytes 5.40 0.00 - 13.40 %    Eosinophils 0.00 0.00 - 6.90 %    Basophils 0.20 0.00 - 1.80 %    Immature Granulocytes 0.50 0.00 - 0.90 %    Nucleated RBC 0.00 0.00 - 0.20 /100 WBC    Neutrophils (Absolute) 9.28 (H) 1.82 - 7.42 K/uL    Lymphs (Absolute) 1.10 1.00 - 4.80 K/uL    Monos (Absolute) 0.60 0.00 - 0.85 K/uL    Eos (Absolute) 0.00 0.00 - 0.51 K/uL    Baso (Absolute) 0.02 0.00 - 0.12 K/uL    Immature Granulocytes (abs) 0.05 0.00 - 0.11 K/uL    NRBC (Absolute) 0.00 K/uL   COMP METABOLIC PANEL    Collection Time: 02/20/25 10:13 PM   Result Value Ref Range    Sodium 136 135 - 145 mmol/L    Potassium 3.4 (L) 3.6 - 5.5 mmol/L    Chloride 92 (L) 96 - 112 mmol/L    Co2 16 (L) 20 - 33 mmol/L    Anion Gap 28.0 (H) 7.0 - 16.0    Glucose 155 (H) 65 - 99 mg/dL    Bun 18 8 - 22 mg/dL    Creatinine 1.07 0.50 - 1.40 mg/dL    Calcium 7.9 (L) 8.4 - 10.2 mg/dL     Correct Calcium 7.7 (L) 8.5 - 10.5 mg/dL    AST(SGOT) 60 (H) 12 - 45 U/L    ALT(SGPT) 32 2 - 50 U/L    Alkaline Phosphatase 82 30 - 99 U/L    Total Bilirubin 0.8 0.1 - 1.5 mg/dL    Albumin 4.3 3.2 - 4.9 g/dL    Total Protein 7.3 6.0 - 8.2 g/dL    Globulin 3.0 1.9 - 3.5 g/dL    A-G Ratio 1.4 g/dL   PROTHROMBIN TIME (INR)    Collection Time: 02/20/25 10:13 PM   Result Value Ref Range    PT 12.8 12.0 - 14.6 sec    INR 0.94 0.87 - 1.13   APTT    Collection Time: 02/20/25 10:13 PM   Result Value Ref Range    APTT 23.0 (L) 24.7 - 36.0 sec   ETHYL ALCOHOL (BLOOD)    Collection Time: 02/20/25 10:13 PM   Result Value Ref Range    Diagnostic Alcohol 282.0 (H) <10.1 mg/dL   ESTIMATED GFR    Collection Time: 02/20/25 10:13 PM   Result Value Ref Range    GFR (CKD-EPI) 90 >60 mL/min/1.73 m 2   Basic Metabolic Panel    Collection Time: 02/21/25  7:37 AM   Result Value Ref Range    Sodium 133 (L) 135 - 145 mmol/L    Potassium 3.0 (L) 3.6 - 5.5 mmol/L    Chloride 93 (L) 96 - 112 mmol/L    Co2 20 20 - 33 mmol/L    Glucose 92 65 - 99 mg/dL    Bun 15 8 - 22 mg/dL    Creatinine 0.92 0.50 - 1.40 mg/dL    Calcium 8.1 (L) 8.4 - 10.2 mg/dL    Anion Gap 20.0 (H) 7.0 - 16.0   ESTIMATED GFR    Collection Time: 02/21/25  7:37 AM   Result Value Ref Range    GFR (CKD-EPI) 108 >60 mL/min/1.73 m 2       Imaging:   CT-ELBOW W/O PLUS RECONS RIGHT   Final Result         1.  Comminuted fracture of the olecranon   2.  Ulnar coronoid process fracture   3.  Small ligamentous avulsion fracture fragment adjacent to the lateral epicondyle   4.  Elbow joint effusion      DX-FOREARM RIGHT   Final Result         1.  Comminuted olecranon fracture which appears to involve the base of the coronoid process.      DX-ELBOW-COMPLETE 3+ RIGHT   Final Result         1.  Comminuted olecranon fracture which appears to involve the base of the coronoid process.             Assessment/Plan: 39M w history of EtOH use and comminuted right olecranon fracture.  Patient is  comfortable in splint, neuro intact to the hand. Dicussed diagnosis as well as recommend management with ORIF with the patient.  Given complexity of fracture, discussed case with ortho trauma colleague, Dr. Hampton, who has agreed to take over surgical management.  Patient is currently admitted for management of EtOH withdrawal, will be here until next week.  Planning for surgery Tuesday AM for ORIF of right olecranon.    NWB RUE  Pain control  NPO after midnight on 2/24  Patient admitted to hospital team, appreciate medical management and optimization ahead of surgery  To OR Tuesday AM        Abel Bradford M.D.  [facility]

## 2025-02-21 NOTE — THERAPY
Physical Therapy Contact Note    Patient Name: Maxime Fernandez  Age:  39 y.o., Sex:  male  Medical Record #: [id number]  Today's Date: 2/21/2025 02/21/25 1003   Treatment Variance   Reason For Missed Therapy Medical - Other (Please Comment)   Interdisciplinary Plan of Care Collaboration   Collaboration Comments Hold awaiting POC, CIWA protocol, possible sx. Up with RN at S level .   Session Information   Date / Session Number  2/21 Hold

## 2025-02-21 NOTE — PROGRESS NOTES
Telemetry Shift Summary     Rhythm: ST  Rate: 120s  Measurements: 0.12/0.08/0.34  Ectopy (reported by Monitor Tech): n/a     Normal Values  Rhythm: Sinus  HR:   Measurements: 0.12-0.20/0.06-0.10/0.30-0.52

## 2025-02-21 NOTE — PROGRESS NOTES
"Subjective     Maxime Fernandez is a 39 y.o. male who presents with Fall (X2days right arm/elbow injury/)            Fall        ROS           Objective     BP (!) 160/94   Pulse (!) 147   Temp 37.1 °C (98.8 °F) (Temporal)   Resp 20   Ht 1.803 m (5' 11\")   Wt 94.3 kg (207 lb 14.4 oz)   SpO2 98%   BMI 29.00 kg/m²      Physical Exam                             Assessment & Plan                 "

## 2025-02-21 NOTE — PROGRESS NOTES
Pt is awake in bed. VSS. Telemetry monitor placed. Pt c/o 5/10 pain in R elbow. Pt declining pain medication at this time. RN applied ice pack. CMS is intact to RUE. Split dressing is clean, dry, and intact. Pt c/o n/v. Medicated per MAR. Current CIWA score is 2 due to mild n/v and anxiety. RN discussed POC with pt for the evening. All questions answered. Call light within reach.

## 2025-02-21 NOTE — DISCHARGE PLANNING
Met with pt at bedside to complete assessment. V  erified information listed on facesheet. Pt reports he lives in a single story home with S/O and their 3 minor children ages 6,3 and 2.   Prior to admission pt was independent with all self care and doesn't use any DME. Pt works a full time job at [employer].   Pt has extensive ETOH history. Pt reports he was sober for 4 years and relapsed two weeks ago. Pt states he has been to [facility] in the past. Pt receptive to ETOH resources and is aware they will be added to S    Care Transition Team Assessment    Information Source  Orientation Level: Oriented X4  Information Given By: Patient  Who is responsible for making decisions for patient? : Patient    Readmission Evaluation  Is this a readmission?: No    Elopement Risk  Legal Hold: No  Ambulatory or Self Mobile in Wheelchair: Yes  Disoriented: No  Psychiatric Symptoms: None  History of Wandering: No  Elopement this Admit: No  Vocalizing Wanting to Leave: No  Displays Behaviors, Body Language Wanting to Leave: No-Not at Risk for Elopement    Interdisciplinary Discharge Planning  Lives with - Patient's Self Care Capacity: Significant Other, Child Less than 18 Years of Age  Patient or legal guardian wants to designate a caregiver: No  Support Systems: None  Housing / Facility: 1 Story House    Discharge Preparedness  What is your plan after discharge?: Home with help  Prior Functional Level: Ambulatory, Independent with Activities of Daily Living, Independent with Medication Management, Drives Self  Difficulity with ADLs: None  Difficulity with IADLs: None    Functional Assesment  Prior Functional Level: Ambulatory, Independent with Activities of Daily Living, Independent with Medication Management, Drives Self    Finances  Financial Barriers to Discharge: No  Prescription Coverage: Yes    Vision / Hearing Impairment  Vision Impairment : Yes  Right Eye Vision: Wears Contacts  Left Eye Vision: Wears Contacts  Hearing Impairment :  No         Advance Directive  Advance Directive?: None    Domestic Abuse  Have you ever been the victim of abuse or violence?: No  Possible Abuse/Neglect Reported to:: Not Applicable    Psychological Assessment  History of Substance Abuse: Alcohol  History of Psychiatric Problems: No  Non-compliant with Treatment: No  Newly Diagnosed Illness: Yes    Discharge Risks or Barriers  Discharge risks or barriers?: Substance abuse  Patient risk factors: Substance abuse    Anticipated Discharge Information  Discharge Disposition: Discharged to home/self care (01)  Discharge Address: [address] [place] [id number]  Discharge Contact Phone Number: [phone]

## 2025-02-21 NOTE — CARE PLAN
The patient is Watcher - Medium risk of patient condition declining or worsening    Shift Goals  Clinical Goals: pt will remain at stated pain goal of 5/10 pain this shift.  Patient Goals: sleep comfortably    Progress made toward(s) clinical / shift goals:      Current PRN medications allow pt to remain at stated pain goal.    Problem: Pain - Standard  Goal: Alleviation of pain or a reduction in pain to the patient’s comfort goal  Outcome: Progressing     Problem: Knowledge Deficit - Standard  Goal: Patient and family/care givers will demonstrate understanding of plan of care, disease process/condition, diagnostic tests and medications  Outcome: Progressing     Problem: Psychosocial  Goal: Patient's level of anxiety will decrease  Outcome: Progressing     Problem: Risk for Aspiration  Goal: Patient's risk for aspiration will be absent or decrease  Outcome: Progressing       Patient is not progressing towards the following goals:

## 2025-02-22 ENCOUNTER — APPOINTMENT (OUTPATIENT)
Dept: RADIOLOGY | Facility: MEDICAL CENTER | Age: 40
End: 2025-02-22
Attending: HOSPITALIST
Payer: COMMERCIAL

## 2025-02-22 PROBLEM — R03.0 ELEVATED BLOOD PRESSURE READING: Status: ACTIVE | Noted: 2025-02-22

## 2025-02-22 PROBLEM — E83.51 HYPOCALCEMIA: Status: ACTIVE | Noted: 2025-02-22

## 2025-02-22 PROBLEM — E55.9 VITAMIN D DEFICIENCY: Status: ACTIVE | Noted: 2025-02-22

## 2025-02-22 LAB
25(OH)D3 SERPL-MCNC: 11 NG/ML (ref 30–100)
ALBUMIN SERPL BCP-MCNC: 3.8 G/DL (ref 3.2–4.9)
ALBUMIN SERPL BCP-MCNC: 4 G/DL (ref 3.2–4.9)
ALBUMIN/GLOB SERPL: 1.5 G/DL
ALP SERPL-CCNC: 68 U/L (ref 30–99)
ALT SERPL-CCNC: 27 U/L (ref 2–50)
ANION GAP SERPL CALC-SCNC: 12 MMOL/L (ref 7–16)
AST SERPL-CCNC: 50 U/L (ref 12–45)
BILIRUB SERPL-MCNC: 1.3 MG/DL (ref 0.1–1.5)
BUN SERPL-MCNC: 11 MG/DL (ref 8–22)
BUN SERPL-MCNC: 12 MG/DL (ref 8–22)
CALCIUM ALBUM COR SERPL-MCNC: 8 MG/DL (ref 8.5–10.5)
CALCIUM ALBUM COR SERPL-MCNC: 8.3 MG/DL (ref 8.5–10.5)
CALCIUM SERPL-MCNC: 7.8 MG/DL (ref 8.4–10.2)
CALCIUM SERPL-MCNC: 8.3 MG/DL (ref 8.4–10.2)
CHLORIDE SERPL-SCNC: 95 MMOL/L (ref 96–112)
CHLORIDE SERPL-SCNC: 95 MMOL/L (ref 96–112)
CO2 SERPL-SCNC: 22 MMOL/L (ref 20–33)
CO2 SERPL-SCNC: 25 MMOL/L (ref 20–33)
CREAT SERPL-MCNC: 0.83 MG/DL (ref 0.5–1.4)
CREAT SERPL-MCNC: 0.85 MG/DL (ref 0.5–1.4)
ERYTHROCYTE [DISTWIDTH] IN BLOOD BY AUTOMATED COUNT: 38.2 FL (ref 35.9–50)
GFR SERPLBLD CREATININE-BSD FMLA CKD-EPI: 112 ML/MIN/1.73 M 2
GFR SERPLBLD CREATININE-BSD FMLA CKD-EPI: 113 ML/MIN/1.73 M 2
GLOBULIN SER CALC-MCNC: 2.6 G/DL (ref 1.9–3.5)
GLUCOSE SERPL-MCNC: 116 MG/DL (ref 65–99)
GLUCOSE SERPL-MCNC: 90 MG/DL (ref 65–99)
HCT VFR BLD AUTO: 38.7 % (ref 42–52)
HGB BLD-MCNC: 13.7 G/DL (ref 14–18)
MAGNESIUM SERPL-MCNC: 2 MG/DL (ref 1.5–2.5)
MCH RBC QN AUTO: 30.4 PG (ref 27–33)
MCHC RBC AUTO-ENTMCNC: 35.4 G/DL (ref 32.3–36.5)
MCV RBC AUTO: 86 FL (ref 81.4–97.8)
PHOSPHATE SERPL-MCNC: 2 MG/DL (ref 2.5–4.5)
PHOSPHATE SERPL-MCNC: 2.7 MG/DL (ref 2.5–4.5)
PLATELET # BLD AUTO: 115 K/UL (ref 164–446)
PMV BLD AUTO: 10.6 FL (ref 9–12.9)
POTASSIUM SERPL-SCNC: 3.2 MMOL/L (ref 3.6–5.5)
POTASSIUM SERPL-SCNC: 3.3 MMOL/L (ref 3.6–5.5)
PROT SERPL-MCNC: 6.4 G/DL (ref 6–8.2)
RBC # BLD AUTO: 4.5 M/UL (ref 4.7–6.1)
SODIUM SERPL-SCNC: 129 MMOL/L (ref 135–145)
SODIUM SERPL-SCNC: 133 MMOL/L (ref 135–145)
WBC # BLD AUTO: 6.2 K/UL (ref 4.8–10.8)

## 2025-02-22 PROCEDURE — A9270 NON-COVERED ITEM OR SERVICE: HCPCS | Performed by: HOSPITALIST

## 2025-02-22 PROCEDURE — 85027 COMPLETE CBC AUTOMATED: CPT

## 2025-02-22 PROCEDURE — 36415 COLL VENOUS BLD VENIPUNCTURE: CPT

## 2025-02-22 PROCEDURE — 700111 HCHG RX REV CODE 636 W/ 250 OVERRIDE (IP): Mod: JZ | Performed by: INTERNAL MEDICINE

## 2025-02-22 PROCEDURE — 80069 RENAL FUNCTION PANEL: CPT

## 2025-02-22 PROCEDURE — 700101 HCHG RX REV CODE 250: Performed by: INTERNAL MEDICINE

## 2025-02-22 PROCEDURE — 83735 ASSAY OF MAGNESIUM: CPT

## 2025-02-22 PROCEDURE — 700105 HCHG RX REV CODE 258: Performed by: HOSPITALIST

## 2025-02-22 PROCEDURE — 700111 HCHG RX REV CODE 636 W/ 250 OVERRIDE (IP): Mod: JZ | Performed by: HOSPITALIST

## 2025-02-22 PROCEDURE — 700102 HCHG RX REV CODE 250 W/ 637 OVERRIDE(OP): Performed by: INTERNAL MEDICINE

## 2025-02-22 PROCEDURE — 770020 HCHG ROOM/CARE - TELE (206)

## 2025-02-22 PROCEDURE — A9270 NON-COVERED ITEM OR SERVICE: HCPCS | Performed by: INTERNAL MEDICINE

## 2025-02-22 PROCEDURE — 94760 N-INVAS EAR/PLS OXIMETRY 1: CPT

## 2025-02-22 PROCEDURE — 84100 ASSAY OF PHOSPHORUS: CPT

## 2025-02-22 PROCEDURE — 700102 HCHG RX REV CODE 250 W/ 637 OVERRIDE(OP): Performed by: HOSPITALIST

## 2025-02-22 PROCEDURE — 700105 HCHG RX REV CODE 258: Performed by: INTERNAL MEDICINE

## 2025-02-22 PROCEDURE — 83970 ASSAY OF PARATHORMONE: CPT

## 2025-02-22 PROCEDURE — 99291 CRITICAL CARE FIRST HOUR: CPT | Performed by: HOSPITALIST

## 2025-02-22 PROCEDURE — 99233 SBSQ HOSP IP/OBS HIGH 50: CPT | Performed by: INTERNAL MEDICINE

## 2025-02-22 PROCEDURE — 71275 CT ANGIOGRAPHY CHEST: CPT

## 2025-02-22 PROCEDURE — 700117 HCHG RX CONTRAST REV CODE 255: Performed by: HOSPITALIST

## 2025-02-22 PROCEDURE — 82306 VITAMIN D 25 HYDROXY: CPT

## 2025-02-22 PROCEDURE — 80053 COMPREHEN METABOLIC PANEL: CPT

## 2025-02-22 RX ORDER — FAMOTIDINE 20 MG/1
20 TABLET, FILM COATED ORAL ONCE
Status: DISCONTINUED | OUTPATIENT
Start: 2025-02-22 | End: 2025-02-22

## 2025-02-22 RX ORDER — DIPHENHYDRAMINE HYDROCHLORIDE 50 MG/ML
50 INJECTION, SOLUTION INTRAMUSCULAR; INTRAVENOUS ONCE
Status: COMPLETED | OUTPATIENT
Start: 2025-02-22 | End: 2025-02-22

## 2025-02-22 RX ORDER — ERGOCALCIFEROL 1.25 MG/1
50000 CAPSULE, LIQUID FILLED ORAL
Status: DISCONTINUED | OUTPATIENT
Start: 2025-02-22 | End: 2025-02-23 | Stop reason: HOSPADM

## 2025-02-22 RX ORDER — CALCIUM GLUCONATE 20 MG/ML
1 INJECTION, SOLUTION INTRAVENOUS ONCE
Status: COMPLETED | OUTPATIENT
Start: 2025-02-22 | End: 2025-02-22

## 2025-02-22 RX ORDER — AMLODIPINE BESYLATE 5 MG/1
10 TABLET ORAL
Status: DISCONTINUED | OUTPATIENT
Start: 2025-02-22 | End: 2025-02-23 | Stop reason: HOSPADM

## 2025-02-22 RX ORDER — LORAZEPAM 2 MG/ML
INJECTION INTRAMUSCULAR
Status: DISPENSED
Start: 2025-02-22 | End: 2025-02-23

## 2025-02-22 RX ORDER — LORAZEPAM 2 MG/ML
0.5 INJECTION INTRAMUSCULAR ONCE
Status: COMPLETED | OUTPATIENT
Start: 2025-02-22 | End: 2025-02-22

## 2025-02-22 RX ORDER — METHYLPREDNISOLONE SODIUM SUCCINATE 125 MG/2ML
125 INJECTION, POWDER, LYOPHILIZED, FOR SOLUTION INTRAMUSCULAR; INTRAVENOUS ONCE
Status: COMPLETED | OUTPATIENT
Start: 2025-02-22 | End: 2025-02-22

## 2025-02-22 RX ORDER — ENOXAPARIN SODIUM 100 MG/ML
40 INJECTION SUBCUTANEOUS DAILY
Status: DISCONTINUED | OUTPATIENT
Start: 2025-02-22 | End: 2025-02-23 | Stop reason: HOSPADM

## 2025-02-22 RX ORDER — SODIUM CHLORIDE 9 MG/ML
500 INJECTION, SOLUTION INTRAVENOUS ONCE
Status: COMPLETED | OUTPATIENT
Start: 2025-02-22 | End: 2025-02-22

## 2025-02-22 RX ORDER — POTASSIUM CHLORIDE 1500 MG/1
40 TABLET, EXTENDED RELEASE ORAL ONCE
Status: COMPLETED | OUTPATIENT
Start: 2025-02-22 | End: 2025-02-22

## 2025-02-22 RX ADMIN — SODIUM CHLORIDE 500 ML: 9 INJECTION, SOLUTION INTRAVENOUS at 22:30

## 2025-02-22 RX ADMIN — POTASSIUM PHOSPHATE, MONOBASIC AND POTASSIUM PHOSPHATE, DIBASIC 30 MMOL: 224; 236 INJECTION, SOLUTION, CONCENTRATE INTRAVENOUS at 07:47

## 2025-02-22 RX ADMIN — OXYCODONE 5 MG: 5 TABLET ORAL at 15:42

## 2025-02-22 RX ADMIN — CHLORDIAZEPOXIDE HYDROCHLORIDE 25 MG: 25 CAPSULE ORAL at 11:19

## 2025-02-22 RX ADMIN — CHLORDIAZEPOXIDE HYDROCHLORIDE 25 MG: 25 CAPSULE ORAL at 17:10

## 2025-02-22 RX ADMIN — LORAZEPAM 0.5 MG: 2 INJECTION INTRAMUSCULAR; INTRAVENOUS at 22:45

## 2025-02-22 RX ADMIN — AMLODIPINE BESYLATE 10 MG: 5 TABLET ORAL at 06:20

## 2025-02-22 RX ADMIN — OXYCODONE 5 MG: 5 TABLET ORAL at 05:48

## 2025-02-22 RX ADMIN — ERGOCALCIFEROL 50000 UNITS: 1.25 CAPSULE ORAL at 15:00

## 2025-02-22 RX ADMIN — SODIUM CHLORIDE: 9 INJECTION, SOLUTION INTRAVENOUS at 02:22

## 2025-02-22 RX ADMIN — METHYLPREDNISOLONE SODIUM SUCCINATE 125 MG: 125 INJECTION, POWDER, FOR SOLUTION INTRAMUSCULAR; INTRAVENOUS at 22:20

## 2025-02-22 RX ADMIN — DIPHENHYDRAMINE HYDROCHLORIDE 50 MG: 50 INJECTION, SOLUTION INTRAMUSCULAR; INTRAVENOUS at 22:15

## 2025-02-22 RX ADMIN — FAMOTIDINE 20 MG: 10 INJECTION, SOLUTION INTRAVENOUS at 22:18

## 2025-02-22 RX ADMIN — ACETAMINOPHEN 650 MG: 325 TABLET ORAL at 00:05

## 2025-02-22 RX ADMIN — SENNOSIDES AND DOCUSATE SODIUM 2 TABLET: 50; 8.6 TABLET ORAL at 17:10

## 2025-02-22 RX ADMIN — CHLORDIAZEPOXIDE HYDROCHLORIDE 50 MG: 25 CAPSULE ORAL at 05:45

## 2025-02-22 RX ADMIN — IOHEXOL 100 ML: 350 INJECTION, SOLUTION INTRAVENOUS at 22:10

## 2025-02-22 RX ADMIN — CALCIUM GLUCONATE 1 G: 20 INJECTION, SOLUTION INTRAVENOUS at 06:23

## 2025-02-22 RX ADMIN — POTASSIUM CHLORIDE 40 MEQ: 1500 TABLET, EXTENDED RELEASE ORAL at 14:30

## 2025-02-22 ASSESSMENT — ENCOUNTER SYMPTOMS
FOCAL WEAKNESS: 0
BLOOD IN STOOL: 0
SHORTNESS OF BREATH: 0
DIZZINESS: 0
PALPITATIONS: 0
MYALGIAS: 1
DEPRESSION: 0
DIARRHEA: 0
HEARTBURN: 0
NAUSEA: 0
HEADACHES: 0
WEAKNESS: 0
ABDOMINAL PAIN: 0
FEVER: 0
VOMITING: 0
COUGH: 0
HALLUCINATIONS: 0
CHILLS: 0
SORE THROAT: 0
BACK PAIN: 0

## 2025-02-22 ASSESSMENT — LIFESTYLE VARIABLES
TOTAL SCORE: 2
NAUSEA AND VOMITING: NO NAUSEA AND NO VOMITING
ANXIETY: MILDLY ANXIOUS
VISUAL DISTURBANCES: NOT PRESENT
AUDITORY DISTURBANCES: NOT PRESENT
AGITATION: NORMAL ACTIVITY
AUDITORY DISTURBANCES: NOT PRESENT
ANXIETY: MILDLY ANXIOUS
ORIENTATION AND CLOUDING OF SENSORIUM: ORIENTED AND CAN DO SERIAL ADDITIONS
TOTAL SCORE: 3
TREMOR: NO TREMOR
AGITATION: NORMAL ACTIVITY
ORIENTATION AND CLOUDING OF SENSORIUM: ORIENTED AND CAN DO SERIAL ADDITIONS
TOTAL SCORE: 2
NAUSEA AND VOMITING: NO NAUSEA AND NO VOMITING
HEADACHE, FULLNESS IN HEAD: VERY MILD
NAUSEA AND VOMITING: NO NAUSEA AND NO VOMITING
HEADACHE, FULLNESS IN HEAD: NOT PRESENT
PAROXYSMAL SWEATS: NO SWEAT VISIBLE
TREMOR: NO TREMOR
ANXIETY: *
HEADACHE, FULLNESS IN HEAD: VERY MILD
TOTAL SCORE: 1
PAROXYSMAL SWEATS: BARELY PERCEPTIBLE SWEATING. PALMS MOIST
HEADACHE, FULLNESS IN HEAD: NOT PRESENT
VISUAL DISTURBANCES: NOT PRESENT
AUDITORY DISTURBANCES: NOT PRESENT
VISUAL DISTURBANCES: NOT PRESENT
AGITATION: NORMAL ACTIVITY
VISUAL DISTURBANCES: NOT PRESENT
AGITATION: NORMAL ACTIVITY
TREMOR: NO TREMOR
HEADACHE, FULLNESS IN HEAD: NOT PRESENT
ANXIETY: MILDLY ANXIOUS
AGITATION: NORMAL ACTIVITY
TREMOR: NO TREMOR
VISUAL DISTURBANCES: NOT PRESENT
NAUSEA AND VOMITING: NO NAUSEA AND NO VOMITING
NAUSEA AND VOMITING: NO NAUSEA AND NO VOMITING
ANXIETY: MILDLY ANXIOUS
ORIENTATION AND CLOUDING OF SENSORIUM: ORIENTED AND CAN DO SERIAL ADDITIONS
PAROXYSMAL SWEATS: BARELY PERCEPTIBLE SWEATING. PALMS MOIST
TOTAL SCORE: VERY MILD ITCHING, PINS AND NEEDLES SENSATION, BURNING OR NUMBNESS
ANXIETY: MILDLY ANXIOUS
TREMOR: NO TREMOR
PAROXYSMAL SWEATS: NO SWEAT VISIBLE
HEADACHE, FULLNESS IN HEAD: NOT PRESENT
TREMOR: NO TREMOR
TOTAL SCORE: VERY MILD ITCHING, PINS AND NEEDLES SENSATION, BURNING OR NUMBNESS
AGITATION: NORMAL ACTIVITY
ORIENTATION AND CLOUDING OF SENSORIUM: ORIENTED AND CAN DO SERIAL ADDITIONS
ORIENTATION AND CLOUDING OF SENSORIUM: ORIENTED AND CAN DO SERIAL ADDITIONS
PAROXYSMAL SWEATS: NO SWEAT VISIBLE
AUDITORY DISTURBANCES: NOT PRESENT
VISUAL DISTURBANCES: NOT PRESENT
TOTAL SCORE: 2
AUDITORY DISTURBANCES: NOT PRESENT
PAROXYSMAL SWEATS: NO SWEAT VISIBLE
ORIENTATION AND CLOUDING OF SENSORIUM: ORIENTED AND CAN DO SERIAL ADDITIONS
AUDITORY DISTURBANCES: NOT PRESENT
TOTAL SCORE: 3
NAUSEA AND VOMITING: NO NAUSEA AND NO VOMITING

## 2025-02-22 ASSESSMENT — PAIN DESCRIPTION - PAIN TYPE
TYPE: ACUTE PAIN

## 2025-02-22 ASSESSMENT — FIBROSIS 4 INDEX: FIB4 SCORE: 3.35

## 2025-02-22 NOTE — PROGRESS NOTES
Hospital Medicine Daily Progress Note    Date of Service  2/22/2025    Chief Complaint  Maxime Fernandez is a 39 y.o. male admitted 2/20/2025 with right arm pain after a fall    Hospital Course  39-year-old male with history of alcohol abuse admitted with severe right arm pain after falling on the ice on Wednesday evening.  He was found to have a right olecranon and ulnar fracture.  Immobilization splint was placed and orthopedic surgery was consulted.  He was placed on alcohol withdrawal protocol and telemetry monitoring for tachycardia    Interval Problem Update  2/22: Heart rate improved into the low 100s.  Multiple electrolyte abnormalities, sodium 129, hypocalcemia, hypophosphatemia, potassium 3.2.  Replace p.o. and IV.  Check PTH and vitamin D    I have discussed this patient's plan of care and discharge plan at IDT rounds today with Case Management, Nursing, Nursing leadership, and other members of the IDT team.    Consultants/Specialty  orthopedics  If he discharges before Tuesday, notify Dr. Hampton so he can plan for outpatient surgery    Code Status  Full Code    Disposition  The patient is not medically cleared for discharge to home or a post-acute facility.  Anticipate discharge to: home with close outpatient follow-up    I have placed the appropriate orders for post-discharge needs.    Review of Systems  Review of Systems   Constitutional:  Positive for malaise/fatigue. Negative for chills and fever.   HENT:  Negative for sore throat.    Respiratory:  Negative for cough and shortness of breath.    Cardiovascular:  Negative for chest pain and palpitations.   Gastrointestinal:  Negative for abdominal pain, blood in stool, diarrhea, heartburn, nausea and vomiting.   Genitourinary:  Negative for dysuria and frequency.   Musculoskeletal:  Positive for joint pain and myalgias. Negative for back pain.   Neurological:  Negative for dizziness, focal weakness, weakness and headaches.    Psychiatric/Behavioral:  Negative for depression and hallucinations.    All other systems reviewed and are negative.       Physical Exam  Temp:  [36.2 °C (97.2 °F)-37.4 °C (99.4 °F)] 36.9 °C (98.4 °F)  Pulse:  [] 107  Resp:  [16-20] 18  BP: (152-179)/() 152/102  SpO2:  [86 %-97 %] 94 %    Physical Exam  Constitutional:       Appearance: He is ill-appearing.   HENT:      Head: Normocephalic and atraumatic.      Nose: Nose normal.      Mouth/Throat:      Mouth: Mucous membranes are moist.   Eyes:      Extraocular Movements: Extraocular movements intact.      Pupils: Pupils are equal, round, and reactive to light.   Cardiovascular:      Rate and Rhythm: Regular rhythm. Tachycardia present.      Heart sounds: No murmur heard.  Pulmonary:      Effort: Pulmonary effort is normal. No respiratory distress.      Breath sounds: Normal breath sounds. No stridor.   Abdominal:      General: Abdomen is flat. Bowel sounds are normal. There is no distension.      Palpations: Abdomen is soft.      Tenderness: There is no abdominal tenderness.   Musculoskeletal:         General: Tenderness and signs of injury present. No swelling or deformity.      Cervical back: Normal range of motion and neck supple.      Comments: Sling in place   Skin:     General: Skin is warm and dry.      Coloration: Skin is not pale.   Neurological:      General: No focal deficit present.      Mental Status: He is alert and oriented to person, place, and time. Mental status is at baseline.   Psychiatric:         Mood and Affect: Mood normal.         Behavior: Behavior normal.         Thought Content: Thought content normal.         Fluids    Intake/Output Summary (Last 24 hours) at 2/22/2025 1157  Last data filed at 2/22/2025 0545  Gross per 24 hour   Intake 4254.49 ml   Output 3050 ml   Net 1204.49 ml        Laboratory  Recent Labs     02/20/25  2213 02/22/25  0158   WBC 11.1* 6.2   RBC 5.19 4.50*   HEMOGLOBIN 16.2 13.7*   HEMATOCRIT 43.9 38.7*    MCV 84.6 86.0   MCH 31.2 30.4   MCHC 36.9* 35.4   RDW 37.5 38.2   PLATELETCT 219 115*   MPV 10.3 10.6     Recent Labs     02/20/25  2213 02/21/25  0737 02/22/25  0158   SODIUM 136 133* 129*   POTASSIUM 3.4* 3.0* 3.2*   CHLORIDE 92* 93* 95*   CO2 16* 20 22   GLUCOSE 155* 92 90   BUN 18 15 12   CREATININE 1.07 0.92 0.83   CALCIUM 7.9* 8.1* 7.8*     Recent Labs     02/20/25 2213   APTT 23.0*   INR 0.94               Imaging  CT-ELBOW W/O PLUS RECONS RIGHT   Final Result         1.  Comminuted fracture of the olecranon   2.  Ulnar coronoid process fracture   3.  Small ligamentous avulsion fracture fragment adjacent to the lateral epicondyle   4.  Elbow joint effusion      DX-FOREARM RIGHT   Final Result         1.  Comminuted olecranon fracture which appears to involve the base of the coronoid process.      DX-ELBOW-COMPLETE 3+ RIGHT   Final Result         1.  Comminuted olecranon fracture which appears to involve the base of the coronoid process.              Assessment/Plan  * Tachycardia  Assessment & Plan  Was having heart rate as high as 140s, sustained.  This improved with fluid bolus  He has multiple electrolyte derangements including hypokalemia, hypocalcemia, hypophosphatemia, hypomagnesemia and hyponatremia.  Replace potassium, magnesium  Replace phosphorus and calcium  I reviewed EKG which showed sinus tachycardia, troponin negative  Heart rate in the low 100s this morning  Continue IV fluids  Alcohol withdrawal protocol/benzos  Pain control  Continue telemetry    Elevated blood pressure reading  Assessment & Plan  Multifactorial, pain, alcohol withdrawal however he has been consistently greater than 140, as high as 160  Add low-dose amlodipine  Monitor    Hypocalcemia  Assessment & Plan  Low ionized calcium  Check PTH and vitamin D  Replace with 1 g calcium gluconate  Repeat in a.m.  Repeat renal panel at noon    Olecranon fracture, right, closed, initial encounter  Assessment & Plan  Acute comminuted  olecranon fracture involving the coronoid process.  I discussed with orthopedic surgeon, Dr. Sanchez Hampton will plan to operate on 2/25 that he remains in the hospital  If he is ready to discharge before then, he will follow-up with Dr. Hampton as an outpatient.  As needed oral oxycodone    Hypokalemia  Assessment & Plan  Replace p.o.  Repeat in a.m.    Alcoholic ketosis (HCC)- (present on admission)  Assessment & Plan  Improved, acidosis has resolved  Check renal panel in a.m.    Anxiety- (present on admission)  Assessment & Plan  Librium and Ativan for alcohol withdrawal helped  Establish care with outpatient PCP         VTE prophylaxis:   SCDs/TEDs   enoxaparin ppx      I have performed a physical exam and reviewed and updated ROS and Plan today (2/22/2025). In review of yesterday's note (2/21/2025), there are no changes except as documented above.    Total time:  51 minutes.  I spent greater than 50% of the time for patient care, counseling, and coordination on this date, including unit/floor time, and face-to-face time with the patient as per interval events and assessment and plan above

## 2025-02-22 NOTE — HOSPITAL COURSE
39-year-old male with history of alcohol abuse admitted with severe right arm pain after falling on the ice on Wednesday evening.  He was found to have a right olecranon and ulnar fracture.  Immobilization splint was placed and orthopedic surgery was consulted.  He was placed on alcohol withdrawal protocol and telemetry monitoring for tachycardia

## 2025-02-22 NOTE — ASSESSMENT & PLAN NOTE
Multifactorial, pain, alcohol withdrawal however he has been consistently greater than 140, as high as 160  Add low-dose amlodipine  Monitor

## 2025-02-22 NOTE — THERAPY
Physical Therapy Contact Note    Patient Name: Maxime Fernandez  Age:  39 y.o., Sex:  male  Medical Record #: [id number]  Today's Date     PT orders received and chart reviewed. After discussion with care provider, the patient currently does not demonstrate or present with any deficits that require the need for skilled acute physical therapy services in the acute care setting at this time. Pt follow up with OP surgery fracture.    Orders will be completed at this time.   Please reorder if there is a change in the patient's current medical status that will require skilled PT intervention.   Thank you.       02/22/25 1320   Initial Contact Note    Initial Contact Note Order Received and Verified. Physical Therapy Evaluation NOT Completed Because Patient Does Not Require Acute Physical Therapy at this Time.

## 2025-02-22 NOTE — PROGRESS NOTES
Telemetry Shift Summary     Rhythm SR-ST  HR Range   Ectopy n/a  Measurements 0.20/0.08/0.34           Normal Values  Rhythm SR  HR Range    Measurements 0.12-0.20 / 0.06-0.10  / 0.30-0.52

## 2025-02-22 NOTE — PROGRESS NOTES
Received report from day shift RN. Assumed patient care at 1900. Patient is A&O4, on 1L NC at 94%, no SOB noted. No signs of distress or discomfort. Patient denies having any pain or nausea, at this time. Pt complains of mild anxiety at this time. Plan of care for the night discussed with patient. Patient verbalized understanding. Patient resting in bed comfortably. Safety precautions in place. All patient belongings and call light within reach. Urinal at bedside. All needs addressed at this time. Patient will call with any needs. Pt on continuous cardiac monitoring. Pt on CIWA precautions. 500 ml fluid bolus infusing per MAR. R arm in sling. CMS noted.

## 2025-02-22 NOTE — THERAPY
Occupational Therapy Contact Note    Patient Name: Maxime Fernandez  Age:  39 y.o., Sex:  male  Medical Record #: [id number]  Today's Date: 2/22/2025 02/22/25 1338   Initial Contact Note    Initial Contact Note Order Received and Verified. Occupational Therapy Evaluation NOT Completed Because Patient Does Not Require Acute Occupational Therapy at this Time.   Interdisciplinary Plan of Care Collaboration   IDT Collaboration with  Nursing;Physical Therapist   Collaboration Comments er RN, pt up self, plans to get Surgery on R elbow as an outpt.  Rn recommends discontinue OT orders.

## 2025-02-22 NOTE — ASSESSMENT & PLAN NOTE
Low ionized calcium  Check PTH and vitamin D  Replace with 1 g calcium gluconate  Repeat in a.m.  Repeat renal panel at noon

## 2025-02-22 NOTE — CARE PLAN
The patient is Stable - Low risk of patient condition declining or worsening    Shift Goals  Clinical Goals: Patient pain will be managed at a tolerable level of 2/10 or less throughout night, Patient will remain free from fall or injury, Monitor CIWA, Monitor SpO2  Patient Goals: Rest comfortably, pain management  Family Goals: n/a    Progress made toward(s) clinical / shift goals:  Patient's pain maintained at a tolerable level of 2/10 or less throughout shift with medication per MAR and rest. Pt did not sustain a fall or injury during shift. Able to rest comfortably throughout shift. CIWA monitored per flowsheets. Pt required 1L O2 throughout night.     Patient is not progressing towards the following goals:

## 2025-02-23 ENCOUNTER — PHARMACY VISIT (OUTPATIENT)
Dept: PHARMACY | Facility: MEDICAL CENTER | Age: 40
End: 2025-02-23
Payer: MEDICARE

## 2025-02-23 VITALS
OXYGEN SATURATION: 96 % | BODY MASS INDEX: 35.56 KG/M2 | HEIGHT: 71 IN | SYSTOLIC BLOOD PRESSURE: 128 MMHG | RESPIRATION RATE: 16 BRPM | TEMPERATURE: 99.1 F | DIASTOLIC BLOOD PRESSURE: 92 MMHG | WEIGHT: 253.97 LBS | HEART RATE: 108 BPM

## 2025-02-23 LAB
ALBUMIN SERPL BCP-MCNC: 3.9 G/DL (ref 3.2–4.9)
ALBUMIN/GLOB SERPL: 1.3 G/DL
ALP SERPL-CCNC: 77 U/L (ref 30–99)
ALT SERPL-CCNC: 44 U/L (ref 2–50)
ANION GAP SERPL CALC-SCNC: 13 MMOL/L (ref 7–16)
AST SERPL-CCNC: 69 U/L (ref 12–45)
BILIRUB SERPL-MCNC: 0.8 MG/DL (ref 0.1–1.5)
BUN SERPL-MCNC: 14 MG/DL (ref 8–22)
CALCIUM ALBUM COR SERPL-MCNC: 8.4 MG/DL (ref 8.5–10.5)
CALCIUM SERPL-MCNC: 8.3 MG/DL (ref 8.4–10.2)
CHLORIDE SERPL-SCNC: 100 MMOL/L (ref 96–112)
CO2 SERPL-SCNC: 21 MMOL/L (ref 20–33)
CREAT SERPL-MCNC: 0.91 MG/DL (ref 0.5–1.4)
ERYTHROCYTE [DISTWIDTH] IN BLOOD BY AUTOMATED COUNT: 38.7 FL (ref 35.9–50)
GFR SERPLBLD CREATININE-BSD FMLA CKD-EPI: 109 ML/MIN/1.73 M 2
GLOBULIN SER CALC-MCNC: 3 G/DL (ref 1.9–3.5)
GLUCOSE SERPL-MCNC: 135 MG/DL (ref 65–99)
HCT VFR BLD AUTO: 40.7 % (ref 42–52)
HGB BLD-MCNC: 14.8 G/DL (ref 14–18)
MAGNESIUM SERPL-MCNC: 1.7 MG/DL (ref 1.5–2.5)
MCH RBC QN AUTO: 31.4 PG (ref 27–33)
MCHC RBC AUTO-ENTMCNC: 36.4 G/DL (ref 32.3–36.5)
MCV RBC AUTO: 86.2 FL (ref 81.4–97.8)
PHOSPHATE SERPL-MCNC: 1.3 MG/DL (ref 2.5–4.5)
PLATELET # BLD AUTO: 110 K/UL (ref 164–446)
PMV BLD AUTO: 11 FL (ref 9–12.9)
POTASSIUM SERPL-SCNC: 4 MMOL/L (ref 3.6–5.5)
PROT SERPL-MCNC: 6.9 G/DL (ref 6–8.2)
PTH-INTACT SERPL-MCNC: 100 PG/ML (ref 14–72)
RBC # BLD AUTO: 4.72 M/UL (ref 4.7–6.1)
SODIUM SERPL-SCNC: 134 MMOL/L (ref 135–145)
WBC # BLD AUTO: 7.1 K/UL (ref 4.8–10.8)

## 2025-02-23 PROCEDURE — 700102 HCHG RX REV CODE 250 W/ 637 OVERRIDE(OP): Performed by: INTERNAL MEDICINE

## 2025-02-23 PROCEDURE — 94760 N-INVAS EAR/PLS OXIMETRY 1: CPT

## 2025-02-23 PROCEDURE — 85027 COMPLETE CBC AUTOMATED: CPT

## 2025-02-23 PROCEDURE — 83735 ASSAY OF MAGNESIUM: CPT

## 2025-02-23 PROCEDURE — 84100 ASSAY OF PHOSPHORUS: CPT

## 2025-02-23 PROCEDURE — A9270 NON-COVERED ITEM OR SERVICE: HCPCS | Performed by: INTERNAL MEDICINE

## 2025-02-23 PROCEDURE — 36415 COLL VENOUS BLD VENIPUNCTURE: CPT

## 2025-02-23 PROCEDURE — 700105 HCHG RX REV CODE 258: Performed by: INTERNAL MEDICINE

## 2025-02-23 PROCEDURE — RXMED WILLOW AMBULATORY MEDICATION CHARGE: Performed by: INTERNAL MEDICINE

## 2025-02-23 PROCEDURE — 80053 COMPREHEN METABOLIC PANEL: CPT

## 2025-02-23 PROCEDURE — 99239 HOSP IP/OBS DSCHRG MGMT >30: CPT | Performed by: INTERNAL MEDICINE

## 2025-02-23 RX ORDER — OXYCODONE HYDROCHLORIDE 5 MG/1
5 TABLET ORAL EVERY 12 HOURS PRN
Qty: 10 TABLET | Refills: 0 | Status: SHIPPED | OUTPATIENT
Start: 2025-02-23 | End: 2025-02-28

## 2025-02-23 RX ORDER — ERGOCALCIFEROL 1.25 MG/1
50000 CAPSULE, LIQUID FILLED ORAL
Qty: 7 CAPSULE | Refills: 0 | Status: SHIPPED | OUTPATIENT
Start: 2025-03-01 | End: 2025-04-13

## 2025-02-23 RX ORDER — AMLODIPINE BESYLATE 10 MG/1
10 TABLET ORAL DAILY
Qty: 100 TABLET | Refills: 3 | Status: SHIPPED | OUTPATIENT
Start: 2025-02-24 | End: 2026-03-31

## 2025-02-23 RX ORDER — ACETAMINOPHEN 325 MG/1
650 TABLET ORAL EVERY 6 HOURS PRN
COMMUNITY
Start: 2025-02-23

## 2025-02-23 RX ADMIN — AMLODIPINE BESYLATE 10 MG: 5 TABLET ORAL at 05:59

## 2025-02-23 RX ADMIN — CHLORDIAZEPOXIDE HYDROCHLORIDE 25 MG: 25 CAPSULE ORAL at 12:05

## 2025-02-23 RX ADMIN — DIBASIC SODIUM PHOSPHATE, MONOBASIC POTASSIUM PHOSPHATE AND MONOBASIC SODIUM PHOSPHATE 500 MG: 852; 155; 130 TABLET ORAL at 08:16

## 2025-02-23 RX ADMIN — CHLORDIAZEPOXIDE HYDROCHLORIDE 25 MG: 25 CAPSULE ORAL at 05:59

## 2025-02-23 RX ADMIN — CHLORDIAZEPOXIDE HYDROCHLORIDE 25 MG: 25 CAPSULE ORAL at 00:18

## 2025-02-23 RX ADMIN — SODIUM CHLORIDE: 9 INJECTION, SOLUTION INTRAVENOUS at 03:55

## 2025-02-23 ASSESSMENT — LIFESTYLE VARIABLES
TOTAL SCORE: 3
TOTAL SCORE: 3
ORIENTATION AND CLOUDING OF SENSORIUM: ORIENTED AND CAN DO SERIAL ADDITIONS
TREMOR: NO TREMOR
AGITATION: NORMAL ACTIVITY
PAROXYSMAL SWEATS: NO SWEAT VISIBLE
NAUSEA AND VOMITING: NO NAUSEA AND NO VOMITING
AGITATION: NORMAL ACTIVITY
ORIENTATION AND CLOUDING OF SENSORIUM: ORIENTED AND CAN DO SERIAL ADDITIONS
AUDITORY DISTURBANCES: NOT PRESENT
TOTAL SCORE: VERY MILD ITCHING, PINS AND NEEDLES SENSATION, BURNING OR NUMBNESS
PAROXYSMAL SWEATS: BARELY PERCEPTIBLE SWEATING. PALMS MOIST
TREMOR: TREMOR NOT VISIBLE BUT CAN BE FELT, FINGERTIP TO FINGERTIP
AUDITORY DISTURBANCES: NOT PRESENT
ANXIETY: *
ORIENTATION AND CLOUDING OF SENSORIUM: ORIENTED AND CAN DO SERIAL ADDITIONS
ANXIETY: *
VISUAL DISTURBANCES: NOT PRESENT
TREMOR: NO TREMOR
HEADACHE, FULLNESS IN HEAD: NOT PRESENT
VISUAL DISTURBANCES: NOT PRESENT
VISUAL DISTURBANCES: NOT PRESENT
NAUSEA AND VOMITING: NO NAUSEA AND NO VOMITING
AUDITORY DISTURBANCES: NOT PRESENT
PAROXYSMAL SWEATS: NO SWEAT VISIBLE
ANXIETY: MILDLY ANXIOUS
TOTAL SCORE: 3
HEADACHE, FULLNESS IN HEAD: NOT PRESENT
TOTAL SCORE: VERY MILD ITCHING, PINS AND NEEDLES SENSATION, BURNING OR NUMBNESS
HEADACHE, FULLNESS IN HEAD: NOT PRESENT
AGITATION: NORMAL ACTIVITY
NAUSEA AND VOMITING: NO NAUSEA AND NO VOMITING

## 2025-02-23 ASSESSMENT — FIBROSIS 4 INDEX: FIB4 SCORE: 3.78

## 2025-02-23 ASSESSMENT — PAIN DESCRIPTION - PAIN TYPE: TYPE: ACUTE PAIN

## 2025-02-23 NOTE — PROGRESS NOTES
Telemetry Shift Summary     Rhythm SR/ST SVT up to 181  HR Range   Ectopy none reported  Measurements  0.12/0.08/0.32     Normal Values  Rhythm SR  HR Range    Measurements 0.12-0.20 / 0.06-0.10  / 0.30-0.52

## 2025-02-23 NOTE — CARE PLAN
The patient is Stable - Low risk of patient condition declining or worsening    Shift Goals  Clinical Goals: Pain Management / CIWA scores  Patient Goals: Rest Comfortably  Family Goals: N/A    Progress made toward(s) clinical / shift goals:  Pt CIWA scores 1-3 throughout shift. Pt pain in arm controlled with prn medications.     Patient is not progressing towards the following goals:

## 2025-02-23 NOTE — PROGRESS NOTES
Rapid Response Summary     Rapid response called at 2208 for:  suspected allergic reaction to contrast    VS: Tachycardia  and Tachypneic  (See Vitals Flowsheet)  Additional info: itching, throat tightness, and SOB after receiving contrast in CT  MD Paged: Lista  Interventions:    Imaging/Tests: N/A   Labs: N/A   Medications: Fluid, solumedrol, pepcid, and benadryl   Other: PIV started   Disposition: Improved with rapid response team interventions. Primary RN updated on plan of care. Rapid team will continue to follow the patient.

## 2025-02-23 NOTE — DISCHARGE SUMMARY
Discharge Summary    CHIEF COMPLAINT ON ADMISSION  Chief Complaint   Patient presents with    GLF     Pt fell on [id number] concrete Wednesday    Arm Injury     Right arm painful,  bruised and swollen especially around elbow    Alcohol Intoxication     Pt has been drinking for last few days       Reason for Admission  GLF on right arm     Admission Date  2/20/2025    CODE STATUS  Full Code    HPI & HOSPITAL COURSE  This is a 39-year-old male with history of alcohol abuse admitted with severe right arm pain after falling on the ice on Wednesday evening.  He was found to have a right olecranon and ulnar fracture.  Immobilization splint was placed and orthopedic surgery was consulted.  He was placed on alcohol withdrawal protocol and telemetry monitoring for tachycardia.    He required scheduled Librium given that he had increasing anxiety and withdrawal symptoms.  He remained on primarily oral Ativan for breakthrough withdrawal symptoms.    He had multiple electrolyte derangements including hypocalcemia, hypokalemia, hypomagnesemia, hypophosphatemia and hyponatremia.  With IV fluids and oral and IV replacement, his electrolytes were corrected into the normal range.  Vitamin D was found to be deficient and he was started on high-dose supplementation which she will continue over the next 8 weeks.    The case was discussed with orthopedic surgery and they feel he does need surgery.  The patient decided to discharge and follow-up with orthopedic surgery as an outpatient for surgical planning.    Of note, he did have persistent tachycardia.  Troponin was negative and EKG showed sinus tachycardia and no evidence of arrhythmia or ischemia.  A CTA was performed due to an elevated D-dimer and the patient had a contrast reaction including diffuse itching, tachycardia and anxiety.  He required treatment with IV Benadryl and IV Solu-Medrol, after which his symptoms resolved and his heart rate returned to normal in the 70s to 90s.    He  was determined stable for discharge for outpatient follow-up with Dr. Hampton for right olecranon fracture repair.    Therefore, he is discharged in fair and stable condition to home with close outpatient follow-up.    The patient met 2-midnight criteria for an inpatient stay at the time of discharge.    Discharge Date  3/23/2025    FOLLOW UP ITEMS POST DISCHARGE  Follow-up with Dr. Hampton for surgical planning    DISCHARGE DIAGNOSES  Principal Problem:    Tachycardia (POA: Unknown)  Active Problems:    Anxiety (POA: Yes)    Alcoholic ketosis (HCC) (POA: Yes)    Hypokalemia (POA: Unknown)    Olecranon fracture, right, closed, initial encounter (POA: Unknown)    Hypocalcemia (POA: Unknown)    Elevated blood pressure reading (POA: Unknown)    Vitamin D deficiency (POA: Unknown)  Resolved Problems:    * No resolved hospital problems. *      FOLLOW UP    Nya Pearson P.A.-C.  [address]  [facility] [id number] [phone]    Schedule an appointment as soon as possible for a visit in 1 week(s)      Tre Hampton M.D.  [address]  [address]  [facility] [id number] [phone]    Call in 1 day(s)        MEDICATIONS ON DISCHARGE     Medication List        START taking these medications        Instructions   acetaminophen 325 MG Tabs  Commonly known as: Tylenol   Take 2 Tablets by mouth every 6 hours as needed for Moderate Pain or Mild Pain.  Dose: 650 mg     amLODIPine 10 MG Tabs  Start taking on: February 24, 2025  Commonly known as: Norvasc   Take 1 Tablet by mouth every day.  Dose: 10 mg     oxyCODONE immediate-release 5 MG Tabs  Commonly known as: Roxicodone   Take 1 Tablet by mouth every 12 hours as needed for Severe Pain for up to 5 days.  Dose: 5 mg     Phospha 250 Neutral [id number] MG tablet  Generic drug: phosphorus   Take 2 Tablets by mouth 2 times a day for 2 days.  Dose: 2 Tablet     vitamin D2 (Ergocalciferol) 1.25 MG (20319 UT) Caps capsule  Start taking on: March 1,  2025  Commonly known as: Drisdol   Take 1 Capsule by mouth every 7 days for 7 doses.  Dose: 50,000 Units              Allergies  Allergies   Allergen Reactions    Inderal [Propranolol]     Omnipaque [Iohexol] Shortness of Breath, Itching, Anxiety and Palpitations     HR 170s       DIET  Orders Placed This Encounter   Procedures    Diet Order Diet: Regular     Standing Status:   Standing     Number of Occurrences:   1     Diet::   Regular [1]       ACTIVITY  As tolerated.  Weight bearing as tolerated    CONSULTATIONS  Dr. Bradford, orthopedic surgery    PROCEDURES  None    LABORATORY  Lab Results   Component Value Date    SODIUM 134 (L) 02/23/2025    POTASSIUM 4.0 02/23/2025    CHLORIDE 100 02/23/2025    CO2 21 02/23/2025    GLUCOSE 135 (H) 02/23/2025    BUN 14 02/23/2025    CREATININE 0.91 02/23/2025        Lab Results   Component Value Date    WBC 7.1 02/23/2025    HEMOGLOBIN 14.8 02/23/2025    HEMATOCRIT 40.7 (L) 02/23/2025    PLATELETCT 110 (L) 02/23/2025        Total time of the discharge process exceeds 42 minutes.

## 2025-02-23 NOTE — CARE PLAN
The patient is Watcher - Medium risk of patient condition declining or worsening    Shift Goals  Clinical Goals: Pain management, CIWA, monitor HR  Patient Goals: Rest  Family Goals: N/A    Progress made toward(s) clinical / shift goals:      Problem: Pain - Standard  Goal: Alleviation of pain or a reduction in pain to the patient’s comfort goal  Outcome: Progressing  Note: Pt declining pain medications, states arm is sore.     Problem: Optimal Care for Alcohol Withdrawal  Goal: Optimal Care for the alcohol withdrawal patient  Outcome: Progressing  Note: CIWA protocol in place, scoring under 5.     Problem: Fall Risk  Goal: Patient will remain free from falls  Outcome: Progressing  Note: Fall precautions in place, pt calls appropriately.        Patient is not progressing towards the following goals:

## 2025-02-23 NOTE — CARE PLAN
The patient is Stable - Low risk of patient condition declining or worsening    Shift Goals  Clinical Goals: Improved labs  Patient Goals: Shower  Family Goals: BRENDAN    Progress made toward(s) clinical / shift goals:    Problem: Knowledge Deficit - Standard  Goal: Patient and family/care givers will demonstrate understanding of plan of care, disease process/condition, diagnostic tests and medications  Outcome: Progressing  Note: Patient's knowledge of plan of care, diagnostics, and medications regularly assessed. RN answers patient questions appropriately, education provided. Patient verbalizes better understanding of their condition.       Problem: Optimal Care for Alcohol Withdrawal  Goal: Optimal Care for the alcohol withdrawal patient  Outcome: Progressing  Note: CIWA 3.       Patient is not progressing towards the following goals:

## 2025-02-23 NOTE — DISCHARGE PLANNING
Case Management Discharge Planning    Admission Date: 2/20/2025  GMLOS: 2.8  ALOS: 2    6-Clicks ADL Score: 12  6-Clicks Mobility Score: 24    Anticipated Discharge Dispo: Discharge Disposition: Discharged to home/self care (01)  Discharge Address: [address] [address]  Discharge Contact Phone Number: [phone]    DME Needed: No    Action(s) Taken: Pt was discussed during IDT rounds. Pt is medically cleared to DC home and will follow up with surgery team outpatient. Per chart review, ETOH resources have been added to AVS. Anticipating no further CM needs at this time.    Escalations Completed: None    Medically Clear: Yes    Next Steps: LMSW to follow for any additional CM needs.    Barriers to Discharge: None    Is the patient up for discharge tomorrow: No, today.

## 2025-02-23 NOTE — PROGRESS NOTES
OVERNIGHT HOSPITALIST:    RAPID RESPONSE called - patient was still on CT suite after CTA Chest done for abnormal Ddimer prior. I came to evaluate patient. He was complaining of severe generalized itching also with numbness to he's moth, tongue, foot and rectal area.  - 180 bpm. Flushed skin.       Brief History: 38 yo M, admitted on 2/20/25 with right olecranon and ulnar fracture under the context of alcohol intoxication. He had persistent tachycardia and has been monitored on telemetry. Orthopedic surgery evaluated patient and planned surgery for 2/25 (Dr. Hampton).      Vitals: Temp:99.3       HR:176      RR:24      BP:1434/103  Gen:Looks anxious. Flushed skin  Lungs:CTAB, no wheezing, no stridor  Heart:Tachycardic, no murmurs      A/P:  #1:Allergic reaction to IV contrast  #2:Panic Attack    -Ordered 50 mg IV Benadryl, 20 mr IV Famotidine and 125 mg IV solumedrol  -Added 500 ml IV NS Bolus and 0.5 mg IV Ativan.  -Ordered 2 L O2  -after receiving medications, HR slowly coming down.   -Patient was placed back to [facility] and brought back to the room. HR now down to 130 bpm.  -CTA Chest negative for PE            The patient remains critically ill.  Critical care time =46  minutes in directly providing and coordinating critical care and extensive data review.  This includes time spent before the visit reviewing the chart, time spent evaluating the patient face-to-face  in the room, time discussing and updating Nursing Staff about plan and time spent after the visit reviewing results and on documentation. No time overlap and excludes procedures.

## 2025-02-24 ENCOUNTER — HOSPITAL ENCOUNTER (OUTPATIENT)
Facility: MEDICAL CENTER | Age: 40
End: 2025-02-24
Attending: ORTHOPAEDIC SURGERY | Admitting: ORTHOPAEDIC SURGERY
Payer: COMMERCIAL

## 2025-03-17 ENCOUNTER — PHARMACY VISIT (OUTPATIENT)
Dept: PHARMACY | Facility: MEDICAL CENTER | Age: 40
End: 2025-03-17
Payer: MEDICARE

## 2025-03-17 ENCOUNTER — OFFICE VISIT (OUTPATIENT)
Dept: URGENT CARE | Facility: PHYSICIAN GROUP | Age: 40
End: 2025-03-17
Payer: COMMERCIAL

## 2025-03-17 VITALS
WEIGHT: 210 LBS | HEIGHT: 71 IN | SYSTOLIC BLOOD PRESSURE: 126 MMHG | HEART RATE: 73 BPM | TEMPERATURE: 97.7 F | OXYGEN SATURATION: 99 % | RESPIRATION RATE: 16 BRPM | DIASTOLIC BLOOD PRESSURE: 80 MMHG | BODY MASS INDEX: 29.4 KG/M2

## 2025-03-17 DIAGNOSIS — B96.89 BACTERIAL SINUSITIS: ICD-10-CM

## 2025-03-17 DIAGNOSIS — J32.9 BACTERIAL SINUSITIS: ICD-10-CM

## 2025-03-17 DIAGNOSIS — F41.9 ANXIETY: ICD-10-CM

## 2025-03-17 DIAGNOSIS — R06.83 LOUD SNORING: ICD-10-CM

## 2025-03-17 DIAGNOSIS — F43.10 PTSD (POST-TRAUMATIC STRESS DISORDER): ICD-10-CM

## 2025-03-17 DIAGNOSIS — R05.1 ACUTE COUGH: ICD-10-CM

## 2025-03-17 PROCEDURE — RXMED WILLOW AMBULATORY MEDICATION CHARGE

## 2025-03-17 PROCEDURE — 99214 OFFICE O/P EST MOD 30 MIN: CPT

## 2025-03-17 PROCEDURE — 3079F DIAST BP 80-89 MM HG: CPT

## 2025-03-17 PROCEDURE — 3074F SYST BP LT 130 MM HG: CPT

## 2025-03-17 RX ORDER — BENZONATATE 100 MG/1
100 CAPSULE ORAL 3 TIMES DAILY PRN
Qty: 21 CAPSULE | Refills: 0 | Status: SHIPPED | OUTPATIENT
Start: 2025-03-17 | End: 2025-03-24

## 2025-03-17 RX ORDER — AMOXICILLIN AND CLAVULANATE POTASSIUM 500; 125 MG/1; MG/1
1 TABLET, FILM COATED ORAL 2 TIMES DAILY
Qty: 14 TABLET | Refills: 0 | Status: CANCELLED | OUTPATIENT
Start: 2025-03-17 | End: 2025-03-24

## 2025-03-17 ASSESSMENT — ENCOUNTER SYMPTOMS
NAUSEA: 0
MYALGIAS: 0
RHINORRHEA: 1
SHORTNESS OF BREATH: 0
HEADACHES: 0
VOMITING: 0
DIARRHEA: 0
COUGH: 1
CHILLS: 0
FEVER: 0
NERVOUS/ANXIOUS: 1
ABDOMINAL PAIN: 0
SORE THROAT: 1
SINUS PAIN: 1

## 2025-03-17 ASSESSMENT — FIBROSIS 4 INDEX: FIB4 SCORE: 3.78

## 2025-03-17 NOTE — PROGRESS NOTES
"Subjective:   Maxime Fernandez is a 39 y.o. male who presents for Cough (X 1 week with congestion, yellow mucous, mild sore throat. Denies fever or chills. )      HPI:        ROS As above in HPI    Medications:    Current Outpatient Medications on File Prior to Visit   Medication Sig Dispense Refill   • acetaminophen (TYLENOL) 325 MG Tab Take 2 Tablets by mouth every 6 hours as needed for Moderate Pain or Mild Pain.     • vitamin D2, Ergocalciferol, (DRISDOL) 1.25 MG (35952 UT) Cap capsule Take 1 Capsule by mouth every 7 days for 7 doses. 7 Capsule 0   • amLODIPine (NORVASC) 10 MG Tab Take 1 Tablet by mouth every day. (Patient not taking: Reported on 3/17/2025) 100 Tablet 3     No current facility-administered medications on file prior to visit.        Allergies:   Inderal [propranolol] and Omnipaque [iohexol]    Problem List:   Patient Active Problem List   Diagnosis   • Elevated LFTs   • Thrombocytopenia (HCC)   • Post-traumatic stress disorder, chronic   • Panic disorder   • Gastroesophageal reflux disease   • Alcohol dependence in early full remission (HCC)   • Anxiety   • Ventral hernia   • Overweight   • Alcoholic ketosis (HCC)   • Hypokalemia   • Olecranon fracture, right, closed, initial encounter   • Tachycardia   • Hypocalcemia   • Elevated blood pressure reading   • Vitamin D deficiency        Surgical History:  No past surgical history on file.    Past Social Hx:   Social History     Tobacco Use   • Smoking status: Never   • Smokeless tobacco: Never   Vaping Use   • Vaping status: Never Used   Substance Use Topics   • Alcohol use: Yes     Comment: 1 pint a day for the last 2 weeks   • Drug use: No          Problem list, medications, and allergies reviewed by myself today in Epic.     Objective:     /80   Pulse 73   Temp 36.5 °C (97.7 °F) (Temporal)   Resp 16   Ht 1.803 m (5' 11\")   Wt 95.3 kg (210 lb)   SpO2 99%   BMI 29.29 kg/m²     Physical Exam    Assessment/Plan:       Results for " orders placed or performed during the hospital encounter of 02/20/25   CBC WITH DIFFERENTIAL    Collection Time: 02/20/25 10:13 PM   Result Value Ref Range    WBC 11.1 (H) 4.8 - 10.8 K/uL    RBC 5.19 4.70 - 6.10 M/uL    Hemoglobin 16.2 14.0 - 18.0 g/dL    Hematocrit 43.9 42.0 - 52.0 %    MCV 84.6 81.4 - 97.8 fL    MCH 31.2 27.0 - 33.0 pg    MCHC 36.9 (H) 32.3 - 36.5 g/dL    RDW 37.5 35.9 - 50.0 fL    Platelet Count 219 164 - 446 K/uL    MPV 10.3 9.0 - 12.9 fL    Neutrophils-Polys 83.90 (H) 44.00 - 72.00 %    Lymphocytes 10.00 (L) 22.00 - 41.00 %    Monocytes 5.40 0.00 - 13.40 %    Eosinophils 0.00 0.00 - 6.90 %    Basophils 0.20 0.00 - 1.80 %    Immature Granulocytes 0.50 0.00 - 0.90 %    Nucleated RBC 0.00 0.00 - 0.20 /100 WBC    Neutrophils (Absolute) 9.28 (H) 1.82 - 7.42 K/uL    Lymphs (Absolute) 1.10 1.00 - 4.80 K/uL    Monos (Absolute) 0.60 0.00 - 0.85 K/uL    Eos (Absolute) 0.00 0.00 - 0.51 K/uL    Baso (Absolute) 0.02 0.00 - 0.12 K/uL    Immature Granulocytes (abs) 0.05 0.00 - 0.11 K/uL    NRBC (Absolute) 0.00 K/uL   COMP METABOLIC PANEL    Collection Time: 02/20/25 10:13 PM   Result Value Ref Range    Sodium 136 135 - 145 mmol/L    Potassium 3.4 (L) 3.6 - 5.5 mmol/L    Chloride 92 (L) 96 - 112 mmol/L    Co2 16 (L) 20 - 33 mmol/L    Anion Gap 28.0 (H) 7.0 - 16.0    Glucose 155 (H) 65 - 99 mg/dL    Bun 18 8 - 22 mg/dL    Creatinine 1.07 0.50 - 1.40 mg/dL    Calcium 7.9 (L) 8.4 - 10.2 mg/dL    Correct Calcium 7.7 (L) 8.5 - 10.5 mg/dL    AST(SGOT) 60 (H) 12 - 45 U/L    ALT(SGPT) 32 2 - 50 U/L    Alkaline Phosphatase 82 30 - 99 U/L    Total Bilirubin 0.8 0.1 - 1.5 mg/dL    Albumin 4.3 3.2 - 4.9 g/dL    Total Protein 7.3 6.0 - 8.2 g/dL    Globulin 3.0 1.9 - 3.5 g/dL    A-G Ratio 1.4 g/dL   PROTHROMBIN TIME (INR)    Collection Time: 02/20/25 10:13 PM   Result Value Ref Range    PT 12.8 12.0 - 14.6 sec    INR 0.94 0.87 - 1.13   APTT    Collection Time: 02/20/25 10:13 PM   Result Value Ref Range    APTT 23.0 (L) 24.7  - 36.0 sec   ETHYL ALCOHOL (BLOOD)    Collection Time: 25 10:13 PM   Result Value Ref Range    Diagnostic Alcohol 282.0 (H) <10.1 mg/dL   ESTIMATED GFR    Collection Time: 25 10:13 PM   Result Value Ref Range    GFR (CKD-EPI) 90 >60 mL/min/1.73 m 2   Basic Metabolic Panel    Collection Time: 25  7:37 AM   Result Value Ref Range    Sodium 133 (L) 135 - 145 mmol/L    Potassium 3.0 (L) 3.6 - 5.5 mmol/L    Chloride 93 (L) 96 - 112 mmol/L    Co2 20 20 - 33 mmol/L    Glucose 92 65 - 99 mg/dL    Bun 15 8 - 22 mg/dL    Creatinine 0.92 0.50 - 1.40 mg/dL    Calcium 8.1 (L) 8.4 - 10.2 mg/dL    Anion Gap 20.0 (H) 7.0 - 16.0   ESTIMATED GFR    Collection Time: 25  7:37 AM   Result Value Ref Range    GFR (CKD-EPI) 108 >60 mL/min/1.73 m 2   D-DIMER    Collection Time: 25  3:17 PM   Result Value Ref Range    D-Dimer 1.12 (H) 0.00 - 0.50 ug/mL (FEU)   MAGNESIUM    Collection Time: 25  3:17 PM   Result Value Ref Range    Magnesium 1.5 1.5 - 2.5 mg/dL   TROPONIN    Collection Time: 25  3:17 PM   Result Value Ref Range    Troponin T 9 6 - 19 ng/L   EKG    Collection Time: 25  5:42 PM   Result Value Ref Range    Report       [facility]    Test Date:  2025  Pt Name:    REX MCGEE        Department: [facility]  MRN:        [id number]                      Room:       [facility]  Gender:     Male                         Technician: CELENA  :        1985                   Requested By:SANGEETA COLON  Order #:    [id number]                    Reading MD: Christiano Starr MD    Measurements  Intervals                                Axis  Rate:       119                          P:          40  AR:         121                          QRS:        55  QRSD:       102                          T:          32  QT:         352  QTc:        495    Interpretive Statements  Sinus tachycardia  Prolonged QT interval  Electronically Signed On 2025 17:42:37 PST by Christiano Starr MD     [facility]  without Differential    Collection Time: 02/22/25  1:58 AM   Result Value Ref Range    WBC 6.2 4.8 - 10.8 K/uL    RBC 4.50 (L) 4.70 - 6.10 M/uL    Hemoglobin 13.7 (L) 14.0 - 18.0 g/dL    Hematocrit 38.7 (L) 42.0 - 52.0 %    MCV 86.0 81.4 - 97.8 fL    MCH 30.4 27.0 - 33.0 pg    MCHC 35.4 32.3 - 36.5 g/dL    RDW 38.2 35.9 - 50.0 fL    Platelet Count 115 (L) 164 - 446 K/uL    MPV 10.6 9.0 - 12.9 fL   Comp Metabolic Panel    Collection Time: 02/22/25  1:58 AM   Result Value Ref Range    Sodium 129 (L) 135 - 145 mmol/L    Potassium 3.2 (L) 3.6 - 5.5 mmol/L    Chloride 95 (L) 96 - 112 mmol/L    Co2 22 20 - 33 mmol/L    Anion Gap 12.0 7.0 - 16.0    Glucose 90 65 - 99 mg/dL    Bun 12 8 - 22 mg/dL    Creatinine 0.83 0.50 - 1.40 mg/dL    Calcium 7.8 (L) 8.4 - 10.2 mg/dL    Correct Calcium 8.0 (L) 8.5 - 10.5 mg/dL    AST(SGOT) 50 (H) 12 - 45 U/L    ALT(SGPT) 27 2 - 50 U/L    Alkaline Phosphatase 68 30 - 99 U/L    Total Bilirubin 1.3 0.1 - 1.5 mg/dL    Albumin 3.8 3.2 - 4.9 g/dL    Total Protein 6.4 6.0 - 8.2 g/dL    Globulin 2.6 1.9 - 3.5 g/dL    A-G Ratio 1.5 g/dL   MAGNESIUM    Collection Time: 02/22/25  1:58 AM   Result Value Ref Range    Magnesium 2.0 1.5 - 2.5 mg/dL   PHOSPHORUS    Collection Time: 02/22/25  1:58 AM   Result Value Ref Range    Phosphorus 2.0 (L) 2.5 - 4.5 mg/dL   ESTIMATED GFR    Collection Time: 02/22/25  1:58 AM   Result Value Ref Range    GFR (CKD-EPI) 113 >60 mL/min/1.73 m 2   Renal Function Panel    Collection Time: 02/22/25 12:32 PM   Result Value Ref Range    Sodium 133 (L) 135 - 145 mmol/L    Potassium 3.3 (L) 3.6 - 5.5 mmol/L    Chloride 95 (L) 96 - 112 mmol/L    Co2 25 20 - 33 mmol/L    Glucose 116 (H) 65 - 99 mg/dL    Creatinine 0.85 0.50 - 1.40 mg/dL    Bun 11 8 - 22 mg/dL    Calcium 8.3 (L) 8.4 - 10.2 mg/dL    Correct Calcium 8.3 (L) 8.5 - 10.5 mg/dL    Phosphorus 2.7 2.5 - 4.5 mg/dL    Albumin 4.0 3.2 - 4.9 g/dL   VITAMIN D,25 HYDROXY (DEFICIENCY)    Collection Time: 02/22/25 12:32 PM    Result Value Ref Range    25-Hydroxy   Vitamin D 25 11 (L) 30 - 100 ng/mL   PTH INTACT (PTH ONLY)    Collection Time: 02/22/25 12:32 PM   Result Value Ref Range    Pth, Intact 100.0 (H) 14.0 - 72.0 pg/mL   ESTIMATED GFR    Collection Time: 02/22/25 12:32 PM   Result Value Ref Range    GFR (CKD-EPI) 112 >60 mL/min/1.73 m 2   Comp Metabolic Panel    Collection Time: 02/23/25  1:13 AM   Result Value Ref Range    Sodium 134 (L) 135 - 145 mmol/L    Potassium 4.0 3.6 - 5.5 mmol/L    Chloride 100 96 - 112 mmol/L    Co2 21 20 - 33 mmol/L    Anion Gap 13.0 7.0 - 16.0    Glucose 135 (H) 65 - 99 mg/dL    Bun 14 8 - 22 mg/dL    Creatinine 0.91 0.50 - 1.40 mg/dL    Calcium 8.3 (L) 8.4 - 10.2 mg/dL    Correct Calcium 8.4 (L) 8.5 - 10.5 mg/dL    AST(SGOT) 69 (H) 12 - 45 U/L    ALT(SGPT) 44 2 - 50 U/L    Alkaline Phosphatase 77 30 - 99 U/L    Total Bilirubin 0.8 0.1 - 1.5 mg/dL    Albumin 3.9 3.2 - 4.9 g/dL    Total Protein 6.9 6.0 - 8.2 g/dL    Globulin 3.0 1.9 - 3.5 g/dL    A-G Ratio 1.3 g/dL   MAGNESIUM    Collection Time: 02/23/25  1:13 AM   Result Value Ref Range    Magnesium 1.7 1.5 - 2.5 mg/dL   PHOSPHORUS    Collection Time: 02/23/25  1:13 AM   Result Value Ref Range    Phosphorus 1.3 (L) 2.5 - 4.5 mg/dL   CBC WITHOUT DIFFERENTIAL    Collection Time: 02/23/25  1:13 AM   Result Value Ref Range    WBC 7.1 4.8 - 10.8 K/uL    RBC 4.72 4.70 - 6.10 M/uL    Hemoglobin 14.8 14.0 - 18.0 g/dL    Hematocrit 40.7 (L) 42.0 - 52.0 %    MCV 86.2 81.4 - 97.8 fL    MCH 31.4 27.0 - 33.0 pg    MCHC 36.4 32.3 - 36.5 g/dL    RDW 38.7 35.9 - 50.0 fL    Platelet Count 110 (L) 164 - 446 K/uL    MPV 11.0 9.0 - 12.9 fL   ESTIMATED GFR    Collection Time: 02/23/25  1:13 AM   Result Value Ref Range    GFR (CKD-EPI) 109 >60 mL/min/1.73 m 2       Diagnosis and associated orders:   There are no diagnoses linked to this encounter.     Comments/MDM:         ***       Return to clinic or go to ED if symptoms worsen or persist. Indications for ED discussed  at length. Patient/Parent/Guardian voices understanding. Follow-up with your primary care provider in 3-5 days. Red flag symptoms discussed. All side effects of medication discussed including allergic response, GI upset, tendon injury, rash, sedation etc.    Please note that this dictation was created using voice recognition software. I have made a reasonable attempt to correct obvious errors, but I expect that there are errors of grammar and possibly content that I did not discover before finalizing the note.    This note was electronically signed by MICHELE Murphy

## 2025-03-17 NOTE — PROGRESS NOTES
"Subjective     Maxime Fernandez is a 39 y.o. male who presents with Cough (X 1 week with congestion, yellow mucous, mild sore throat. Denies fever or chills. )    Cough x one week with chest pain due to coughing. Patient initially assumed the symptoms were due to seasonal allergies. Worse at night and in the morning until about noon.   Exposed to son with similar symptoms 3 weeks ago, son now improving, no formal dx.  Production of yellow mucus with trace blood x two days.  Utilizing Flonase & cough syrup at home, with minimal improvement.          Cough  Associated symptoms include chest pain (MSK related from cough), nasal congestion, postnasal drip, rhinorrhea and a sore throat. Pertinent negatives include no chills, ear congestion, ear pain, fever, headaches, myalgias or shortness of breath. The symptoms are aggravated by lying down and other (worse in morning as well until noon). The treatment provided mild relief. There is no history of asthma.       Review of Systems   Constitutional:  Negative for chills, fever and malaise/fatigue.   HENT:  Positive for congestion, postnasal drip, rhinorrhea, sinus pain and sore throat. Negative for ear pain.    Respiratory:  Positive for cough. Negative for shortness of breath.    Cardiovascular:  Positive for chest pain (MSK related from cough).   Gastrointestinal:  Negative for abdominal pain, diarrhea, nausea and vomiting.   Musculoskeletal:  Negative for myalgias.   Neurological:  Negative for headaches.   Psychiatric/Behavioral:  The patient is nervous/anxious.               Objective     /80   Pulse 73   Temp 36.5 °C (97.7 °F) (Temporal)   Resp 16   Ht 1.803 m (5' 11\")   Wt 95.3 kg (210 lb)   SpO2 99%   BMI 29.29 kg/m²      Physical Exam  Constitutional:       General: He is not in acute distress.     Appearance: Normal appearance. He is ill-appearing. He is not toxic-appearing or diaphoretic.   HENT:      Head: Normocephalic and atraumatic.      " Right Ear: Tympanic membrane, ear canal and external ear normal. There is no impacted cerumen.      Left Ear: Tympanic membrane, ear canal and external ear normal. There is no impacted cerumen.      Nose: Congestion and rhinorrhea present.      Mouth/Throat:      Mouth: Mucous membranes are moist.      Pharynx: Oropharynx is clear. Posterior oropharyngeal erythema present. No oropharyngeal exudate.      Tonsils: No tonsillar exudate or tonsillar abscesses. 3+ on the right. 3+ on the left.   Eyes:      Conjunctiva/sclera: Conjunctivae normal.      Pupils: Pupils are equal, round, and reactive to light.   Cardiovascular:      Rate and Rhythm: Normal rate.      Heart sounds: No murmur heard.     No gallop.   Pulmonary:      Effort: Pulmonary effort is normal. No respiratory distress.      Breath sounds: Normal breath sounds. No wheezing, rhonchi or rales.   Musculoskeletal:      Cervical back: Normal range of motion.   Lymphadenopathy:      Cervical: No cervical adenopathy.   Skin:     General: Skin is warm and dry.   Neurological:      General: No focal deficit present.      Mental Status: He is alert and oriented to person, place, and time.   Psychiatric:         Mood and Affect: Mood is anxious.                Assessment & Plan      1. Acute cough  - benzonatate (TESSALON) 100 MG Cap; Take 1 Capsule by mouth 3 times a day as needed for Cough for up to 7 days.  Dispense: 21 Capsule; Refill: 0    2. Bacterial sinusitis  - amoxicillin-clavulanate (AUGMENTIN) 875-125 MG Tab; Take 1 Tablet by mouth 2 times a day for 7 days.  Dispense: 14 Tablet; Refill: 0    3. Anxiety  - Referral to Behavioral Health    4. PTSD (post-traumatic stress disorder)  - Referral to Behavioral Health    5. Loud snoring  - Referral to Pulmonary and Sleep Medicine     Exposure to sick persons with similar symptomology, with absence of fever, chills, malaise, difficulty swallowing, or shortness of breath suspicious for viral infection with likely  bacterial secondary infection after a week of symptoms. No long experiencing systemic symptoms. Will start him on Augumentin and Tessalon. Supportive measures encouraged. Patient also requesting referral to psychiatry for anxiety and ptsd. He states he was diagnosed with PTSD by [facility]. He likely needs evaluation and medication management. Finally, he is requesting referral to sleep medicine for sleep study. He reports witnessed loud snoring.   Follow up with PCP advised.

## 2025-03-21 NOTE — Clinical Note
REFERRAL APPROVAL NOTICE         Sent on March 21, 2025                   Maxime Fernandez  [address] [facility] [facility] [address]                   Dear Mr. Juliette Fernandez,    After a careful review of the medical information and benefit coverage, [facility] has processed your referral. See below for additional details.    If applicable, you must be actively enrolled with your insurance for coverage of the authorized service. If you have any questions regarding your coverage, please contact your insurance directly.    REFERRAL INFORMATION   Referral #:  [id number]  Referred-To Provider    Referred-By Provider:  Behavioral Health    MAAME Weiner, [facility]      [address]  [address]  [facility] [address] [phone] [address] [facility] [address]  [phone]    Referral Start Date:  03/17/2025  Referral End Date:   03/17/2026             SCHEDULING  If you do not already have an appointment, please call [phone] to make an appointment.     MORE INFORMATION  If you do not already have a [employer] account, sign up at: [email or web address]  You can access your medical information, make appointments, see lab results, billing information, and more.  If you have questions regarding this referral, please contact  the [facility] Referrals department at:             [phone]. Monday - Friday 8:00AM - 5:00PM.     Sincerely,    [facility]

## 2025-03-30 ENCOUNTER — APPOINTMENT (OUTPATIENT)
Dept: URGENT CARE | Facility: CLINIC | Age: 40
End: 2025-03-30
Payer: COMMERCIAL

## 2025-03-30 ENCOUNTER — HOSPITAL ENCOUNTER (EMERGENCY)
Facility: MEDICAL CENTER | Age: 40
End: 2025-03-30
Attending: EMERGENCY MEDICINE
Payer: COMMERCIAL

## 2025-03-30 VITALS
SYSTOLIC BLOOD PRESSURE: 139 MMHG | RESPIRATION RATE: 16 BRPM | DIASTOLIC BLOOD PRESSURE: 101 MMHG | HEART RATE: 77 BPM | HEIGHT: 72 IN | BODY MASS INDEX: 28.55 KG/M2 | OXYGEN SATURATION: 97 % | TEMPERATURE: 97.8 F | WEIGHT: 210.76 LBS

## 2025-03-30 DIAGNOSIS — Z47.89 CAST DISCOMFORT: Primary | ICD-10-CM

## 2025-03-30 PROCEDURE — 99283 EMERGENCY DEPT VISIT LOW MDM: CPT

## 2025-03-30 ASSESSMENT — FIBROSIS 4 INDEX: FIB4 SCORE: 3.78

## 2025-03-31 NOTE — ED TRIAGE NOTES
" BP (!) 148/101   Pulse 76   Temp 36.6 °C (97.9 °F) (Temporal)   Resp 16   Ht 1.816 m (5' 11.5\")   Wt 95.6 kg (210 lb 12.2 oz)   SpO2 97%   BMI 28.99 kg/m²     Chief Complaint   Patient presents with    Other     Has cast on R arm   had a bad itch under cast and last night used a plastic fork to scratch it and it broke off      Comes in w/ his girlfriend   per pt cast if suppose to come off this coming Wednesday   "

## 2025-03-31 NOTE — DISCHARGE INSTRUCTIONS
Please follow-up with orthopedic surgery at your scheduled appointment on Wednesday have a cast removed.  Take Tylenol Motrin to help with pain.  Come back if symptoms worsen.  Thank you for coming in today.

## 2025-03-31 NOTE — ED PROVIDER NOTES
ED Provider Note    Scribed for Raleigh Jimenez by Raleigh Jimenez. 3/30/2025  8:11 PM    Primary care provider: Nya Pearson P.A.-C.  Means of arrival: private vehicle   History obtained from: Patient  History limited by: None    CHIEF COMPLAINT  Chief Complaint   Patient presents with    Other     Has cast on R arm   had a bad itch under cast and last night used a plastic fork to scratch it and it broke off      EXTERNAL RECORDS REVIEWED  Inpatient Notes patient was admitted on February 20 for a 3-day stay for tachycardia, alcohol tox occasion and right elbow injury, found to have a right olecranon and ulnar fracture.    HPI/ROS  LIMITATION TO HISTORY   Select: : None  OUTSIDE HISTORIAN(S):  Significant other at bedside as right collateral formation    HPI  Maxime Fernandez is a 39 y.o. male who presents to the Emergency Department with a history of having a mechanical fall over a month ago, resulting in olecranon and ulnar fracture that was treated conservatively with immobilization and casting by [facility].  Patient was initially advised to undergo surgery but he declined stating he did not want to be out of work and thus was splinted.  His cast is supposed to be removed on Wednesday.  However last evening he had a severe scratch underneath it and was scratching with a plastic fork and a piece of the plastic broke off and has now migrated up the cast and is causing him significant annoyance and pain, a constant stabbing sensation he is asking for the cast to be removed.  He has no other complaints.    REVIEW OF SYSTEMS  As above, all other systems reviewed and are negative.   See HPI for further details.     PAST MEDICAL HISTORY   has a past medical history of Anxiety, Depression, GERD (gastroesophageal reflux disease), and Psychiatric disorder.  SURGICAL HISTORY  patient denies any surgical history  SOCIAL HISTORY  Social History     Tobacco Use    Smoking status: Never     "Smokeless tobacco: Never   Vaping Use    Vaping status: Never Used   Substance Use Topics    Alcohol use: Yes     Comment: occ    Drug use: No      Social History     Substance and Sexual Activity   Drug Use No     FAMILY HISTORY  Family History   Problem Relation Age of Onset    Thyroid Mother     Hyperlipidemia Father     No Known Problems Sister     No Known Problems Brother      CURRENT MEDICATIONS  Home Medications       Reviewed by Paola Singh R.N. (Registered Nurse) on 03/30/25 at 1937  Med List Status: Partial     Medication Last Dose Status   acetaminophen (TYLENOL) 325 MG Tab  Active   amLODIPine (NORVASC) 10 MG Tab  Active   vitamin D2, Ergocalciferol, (DRISDOL) 1.25 MG (76336 UT) Cap capsule  Active                  ALLERGIES  Allergies   Allergen Reactions    Inderal [Propranolol]     Omnipaque [Iohexol] Shortness of Breath, Itching, Anxiety and Palpitations     HR 170s       PHYSICAL EXAM    VITAL SIGNS:   Vitals:    03/30/25 1933 03/30/25 1935   BP:  (!) 148/101   Pulse:  76   Resp:  16   Temp:  36.6 °C (97.9 °F)   TempSrc:  Temporal   SpO2:  97%   Weight: 95.6 kg (210 lb 12.2 oz)    Height: 1.816 m (5' 11.5\")      Vitals: My interpretation: Hypertension, not tachycardic, afebrile, not hypoxic    Reinterpretation of vitals: Unchanged    PE:   Gen: sitting comfortably, speaking clearly, appears in no acute distress   ENT: Mucous membranes moist, posterior pharynx clear, uvula midline, nares patent bilaterally   Neck: Supple, FROM  Pulmonary: Lungs are clear to auscultation bilaterally. No tachypnea  CV:  RRR, no murmur appreciated, pulses 2+ in both upper and lower extremities  Abdomen: soft, NT/ND; no rebound/guarding  : no CVA or suprapubic tenderness   Neuro: A&Ox4 (person, place, time, situation), speech fluent, gait steady, no focal deficits appreciated  Skin: No rash or lesions.  No pallor or jaundice.  No cyanosis.  Warm and dry.   Left lower extremity cast in place, neuro vas intact, " no abnormalities appreciated    COURSE & MEDICAL DECISION MAKING  Nursing notes, VS, PMSFHx, labs, imaging, EKG reviewed in chart.    Ddx: Strain, sprain, fracture, dislocation, foreign body    MDM: 8:11 PM Maxime Fernandez is a 39 y.o. male who presents to the Emergency Department with a history of having a mechanical fall over a month ago, resulting in olecranon and ulnar fracture that was treated conservatively with immobilization and casting by [facility] on 2/25.  Patient was initially advised to undergo surgery but he declined stating he did not want to be out of work and thus was splinted.  His cast is supposed to be removed on Wednesday.  However last evening he had a severe scratch underneath it and was scratching with a plastic fork and a piece of the plastic broke off and has now migrated up the cast and is causing him significant annoyance and pain, a constant stabbing sensation he is asking for the cast to be removed.  He has no other complaints.  Upon arrival here his vital signs are fairly unremarkable.  Cast in place.  No way for me to retrieve the plastic fork piece.  At this time we will have the patient follow-up on his appointment on Wednesday for definitive treatment and cast removal.  He verbalized understanding and is amenable.  No significant issues being caused by small foreign body being present.  He does not seem uncomfortable or irritated at time of my evaluation.  He verbalized understanding.    ADDITIONAL PROBLEM LIST AND DISPOSITION    I have discussed management of the patient with the following physicians and ROXY's: Orthopedic surgery    Discussion of management with other QHP or appropriate source(s): None     Escalation of care considered, and ultimately not performed:diagnostic imaging    Barriers to care at this time, including but not limited to:  None .     Decision tools and prescription drugs considered including, but not limited to: Pain Medications Tylenol  as needed .    FINAL IMPRESSION  1. Cast discomfort Acute      The note accurately reflects work and decisions made by me.  Raleigh Jimenez  3/30/2025  8:11 PM

## 2025-04-16 PROCEDURE — RXMED WILLOW AMBULATORY MEDICATION CHARGE: Performed by: CLINIC/CENTER

## 2025-04-21 ENCOUNTER — PHARMACY VISIT (OUTPATIENT)
Dept: PHARMACY | Facility: MEDICAL CENTER | Age: 40
End: 2025-04-21
Payer: MEDICARE

## 2025-04-21 RX ORDER — SULFAMETHOXAZOLE AND TRIMETHOPRIM 800; 160 MG/1; MG/1
TABLET ORAL
Qty: 10 TABLET | Refills: 0 | OUTPATIENT
Start: 2025-04-21